# Patient Record
Sex: MALE | Race: WHITE | Employment: OTHER | URBAN - METROPOLITAN AREA
[De-identification: names, ages, dates, MRNs, and addresses within clinical notes are randomized per-mention and may not be internally consistent; named-entity substitution may affect disease eponyms.]

---

## 2021-07-22 ENCOUNTER — APPOINTMENT (OUTPATIENT)
Dept: GENERAL RADIOLOGY | Age: 76
DRG: 637 | End: 2021-07-22
Payer: MEDICARE

## 2021-07-22 ENCOUNTER — HOSPITAL ENCOUNTER (INPATIENT)
Age: 76
LOS: 1 days | Discharge: NURSING, REHAB OR LONG-TERM CARE | DRG: 637 | End: 2021-07-26
Attending: EMERGENCY MEDICINE | Admitting: INTERNAL MEDICINE
Payer: MEDICARE

## 2021-07-22 ENCOUNTER — APPOINTMENT (OUTPATIENT)
Dept: CT IMAGING | Age: 76
DRG: 637 | End: 2021-07-22
Payer: MEDICARE

## 2021-07-22 DIAGNOSIS — R77.8 ELEVATED TROPONIN: ICD-10-CM

## 2021-07-22 DIAGNOSIS — R41.0 CONFUSION: Primary | ICD-10-CM

## 2021-07-22 DIAGNOSIS — I63.9 CEREBROVASCULAR ACCIDENT (CVA), UNSPECIFIED MECHANISM (HCC): ICD-10-CM

## 2021-07-22 PROBLEM — R41.82 ALTERED MENTAL STATUS: Status: ACTIVE | Noted: 2021-07-22

## 2021-07-22 LAB
ALBUMIN SERPL-MCNC: 3.6 G/DL (ref 3.5–5.2)
ALP BLD-CCNC: 111 U/L (ref 40–129)
ALT SERPL-CCNC: 22 U/L (ref 0–40)
AMMONIA: 15 UMOL/L (ref 16–60)
ANION GAP SERPL CALCULATED.3IONS-SCNC: 9 MMOL/L (ref 7–16)
AST SERPL-CCNC: 19 U/L (ref 0–39)
BASOPHILS ABSOLUTE: 0.01 E9/L (ref 0–0.2)
BASOPHILS RELATIVE PERCENT: 0.2 % (ref 0–2)
BILIRUB SERPL-MCNC: 0.5 MG/DL (ref 0–1.2)
BILIRUBIN URINE: NEGATIVE
BLOOD, URINE: NEGATIVE
BUN BLDV-MCNC: 15 MG/DL (ref 6–23)
CALCIUM SERPL-MCNC: 8.9 MG/DL (ref 8.6–10.2)
CHLORIDE BLD-SCNC: 98 MMOL/L (ref 98–107)
CLARITY: CLEAR
CO2: 26 MMOL/L (ref 22–29)
COLOR: YELLOW
CREAT SERPL-MCNC: 0.6 MG/DL (ref 0.7–1.2)
EKG ATRIAL RATE: 84 BPM
EKG P AXIS: 43 DEGREES
EKG P-R INTERVAL: 140 MS
EKG Q-T INTERVAL: 400 MS
EKG QRS DURATION: 84 MS
EKG QTC CALCULATION (BAZETT): 472 MS
EKG R AXIS: 4 DEGREES
EKG T AXIS: -2 DEGREES
EKG VENTRICULAR RATE: 84 BPM
EOSINOPHILS ABSOLUTE: 0.04 E9/L (ref 0.05–0.5)
EOSINOPHILS RELATIVE PERCENT: 0.6 % (ref 0–6)
GFR AFRICAN AMERICAN: >60
GFR NON-AFRICAN AMERICAN: >60 ML/MIN/1.73
GLUCOSE BLD-MCNC: 479 MG/DL (ref 74–99)
GLUCOSE URINE: 500 MG/DL
HCT VFR BLD CALC: 41.2 % (ref 37–54)
HEMOGLOBIN: 13.6 G/DL (ref 12.5–16.5)
IMMATURE GRANULOCYTES #: 0.02 E9/L
IMMATURE GRANULOCYTES %: 0.3 % (ref 0–5)
KETONES, URINE: 40 MG/DL
LEUKOCYTE ESTERASE, URINE: NEGATIVE
LYMPHOCYTES ABSOLUTE: 0.69 E9/L (ref 1.5–4)
LYMPHOCYTES RELATIVE PERCENT: 10.8 % (ref 20–42)
MCH RBC QN AUTO: 30 PG (ref 26–35)
MCHC RBC AUTO-ENTMCNC: 33 % (ref 32–34.5)
MCV RBC AUTO: 90.7 FL (ref 80–99.9)
METER GLUCOSE: 243 MG/DL (ref 74–99)
MONOCYTES ABSOLUTE: 0.5 E9/L (ref 0.1–0.95)
MONOCYTES RELATIVE PERCENT: 7.8 % (ref 2–12)
NEUTROPHILS ABSOLUTE: 5.11 E9/L (ref 1.8–7.3)
NEUTROPHILS RELATIVE PERCENT: 80.3 % (ref 43–80)
NITRITE, URINE: NEGATIVE
PDW BLD-RTO: 13.2 FL (ref 11.5–15)
PH UA: 5 (ref 5–9)
PLATELET # BLD: 297 E9/L (ref 130–450)
PMV BLD AUTO: 8.6 FL (ref 7–12)
POTASSIUM REFLEX MAGNESIUM: 3.9 MMOL/L (ref 3.5–5)
PROTEIN UA: NEGATIVE MG/DL
RBC # BLD: 4.54 E12/L (ref 3.8–5.8)
SODIUM BLD-SCNC: 133 MMOL/L (ref 132–146)
SPECIFIC GRAVITY UA: 1.01 (ref 1–1.03)
TOTAL PROTEIN: 5.9 G/DL (ref 6.4–8.3)
TROPONIN, HIGH SENSITIVITY: 59 NG/L (ref 0–11)
TROPONIN, HIGH SENSITIVITY: 61 NG/L (ref 0–11)
TROPONIN, HIGH SENSITIVITY: 62 NG/L (ref 0–11)
UROBILINOGEN, URINE: 0.2 E.U./DL
WBC # BLD: 6.4 E9/L (ref 4.5–11.5)

## 2021-07-22 PROCEDURE — 93005 ELECTROCARDIOGRAM TRACING: CPT | Performed by: STUDENT IN AN ORGANIZED HEALTH CARE EDUCATION/TRAINING PROGRAM

## 2021-07-22 PROCEDURE — 93010 ELECTROCARDIOGRAM REPORT: CPT | Performed by: INTERNAL MEDICINE

## 2021-07-22 PROCEDURE — 82746 ASSAY OF FOLIC ACID SERUM: CPT

## 2021-07-22 PROCEDURE — 82140 ASSAY OF AMMONIA: CPT

## 2021-07-22 PROCEDURE — 82607 VITAMIN B-12: CPT

## 2021-07-22 PROCEDURE — 82962 GLUCOSE BLOOD TEST: CPT

## 2021-07-22 PROCEDURE — 6370000000 HC RX 637 (ALT 250 FOR IP): Performed by: INTERNAL MEDICINE

## 2021-07-22 PROCEDURE — 2580000003 HC RX 258: Performed by: INTERNAL MEDICINE

## 2021-07-22 PROCEDURE — 80053 COMPREHEN METABOLIC PANEL: CPT

## 2021-07-22 PROCEDURE — G0378 HOSPITAL OBSERVATION PER HR: HCPCS

## 2021-07-22 PROCEDURE — 81003 URINALYSIS AUTO W/O SCOPE: CPT

## 2021-07-22 PROCEDURE — 36415 COLL VENOUS BLD VENIPUNCTURE: CPT

## 2021-07-22 PROCEDURE — 99285 EMERGENCY DEPT VISIT HI MDM: CPT

## 2021-07-22 PROCEDURE — 84484 ASSAY OF TROPONIN QUANT: CPT

## 2021-07-22 PROCEDURE — 71045 X-RAY EXAM CHEST 1 VIEW: CPT

## 2021-07-22 PROCEDURE — 85025 COMPLETE CBC W/AUTO DIFF WBC: CPT

## 2021-07-22 PROCEDURE — 72100 X-RAY EXAM L-S SPINE 2/3 VWS: CPT

## 2021-07-22 PROCEDURE — 70450 CT HEAD/BRAIN W/O DYE: CPT

## 2021-07-22 RX ORDER — DEXTROSE MONOHYDRATE 50 MG/ML
100 INJECTION, SOLUTION INTRAVENOUS PRN
Status: DISCONTINUED | OUTPATIENT
Start: 2021-07-22 | End: 2021-07-26 | Stop reason: HOSPADM

## 2021-07-22 RX ORDER — SODIUM CHLORIDE 0.9 % (FLUSH) 0.9 %
5-40 SYRINGE (ML) INJECTION PRN
Status: DISCONTINUED | OUTPATIENT
Start: 2021-07-22 | End: 2021-07-26 | Stop reason: HOSPADM

## 2021-07-22 RX ORDER — POLYETHYLENE GLYCOL 3350 17 G/17G
17 POWDER, FOR SOLUTION ORAL DAILY PRN
Status: DISCONTINUED | OUTPATIENT
Start: 2021-07-22 | End: 2021-07-26 | Stop reason: HOSPADM

## 2021-07-22 RX ORDER — ACETAMINOPHEN 325 MG/1
650 TABLET ORAL EVERY 6 HOURS PRN
Status: DISCONTINUED | OUTPATIENT
Start: 2021-07-22 | End: 2021-07-26 | Stop reason: HOSPADM

## 2021-07-22 RX ORDER — SODIUM CHLORIDE 9 MG/ML
25 INJECTION, SOLUTION INTRAVENOUS PRN
Status: DISCONTINUED | OUTPATIENT
Start: 2021-07-22 | End: 2021-07-26 | Stop reason: HOSPADM

## 2021-07-22 RX ORDER — ASPIRIN 81 MG/1
324 TABLET, CHEWABLE ORAL ONCE
Status: DISCONTINUED | OUTPATIENT
Start: 2021-07-22 | End: 2021-07-23

## 2021-07-22 RX ORDER — NICOTINE POLACRILEX 4 MG
15 LOZENGE BUCCAL PRN
Status: DISCONTINUED | OUTPATIENT
Start: 2021-07-22 | End: 2021-07-26 | Stop reason: HOSPADM

## 2021-07-22 RX ORDER — 0.9 % SODIUM CHLORIDE 0.9 %
1000 INTRAVENOUS SOLUTION INTRAVENOUS ONCE
Status: DISCONTINUED | OUTPATIENT
Start: 2021-07-22 | End: 2021-07-26 | Stop reason: HOSPADM

## 2021-07-22 RX ORDER — HYDRALAZINE HYDROCHLORIDE 20 MG/ML
5 INJECTION INTRAMUSCULAR; INTRAVENOUS ONCE
Status: DISCONTINUED | OUTPATIENT
Start: 2021-07-22 | End: 2021-07-22

## 2021-07-22 RX ORDER — SODIUM CHLORIDE 0.9 % (FLUSH) 0.9 %
5-40 SYRINGE (ML) INJECTION EVERY 12 HOURS SCHEDULED
Status: DISCONTINUED | OUTPATIENT
Start: 2021-07-22 | End: 2021-07-26 | Stop reason: HOSPADM

## 2021-07-22 RX ORDER — ACETAMINOPHEN 650 MG/1
650 SUPPOSITORY RECTAL EVERY 6 HOURS PRN
Status: DISCONTINUED | OUTPATIENT
Start: 2021-07-22 | End: 2021-07-26 | Stop reason: HOSPADM

## 2021-07-22 RX ORDER — DEXTROSE MONOHYDRATE 25 G/50ML
12.5 INJECTION, SOLUTION INTRAVENOUS PRN
Status: DISCONTINUED | OUTPATIENT
Start: 2021-07-22 | End: 2021-07-26 | Stop reason: HOSPADM

## 2021-07-22 RX ADMIN — Medication 10 ML: at 21:00

## 2021-07-22 RX ADMIN — INSULIN LISPRO 1 UNITS: 100 INJECTION, SOLUTION INTRAVENOUS; SUBCUTANEOUS at 22:37

## 2021-07-22 ASSESSMENT — ENCOUNTER SYMPTOMS
COUGH: 0
VOMITING: 0
EYE REDNESS: 0
SHORTNESS OF BREATH: 0
NAUSEA: 0
BACK PAIN: 0
EYE DISCHARGE: 0
SORE THROAT: 0
DIARRHEA: 0
SINUS PRESSURE: 0
EYE PAIN: 0
WHEEZING: 0
ABDOMINAL PAIN: 0

## 2021-07-22 ASSESSMENT — PAIN DESCRIPTION - LOCATION: LOCATION: BACK

## 2021-07-22 ASSESSMENT — PAIN DESCRIPTION - ORIENTATION: ORIENTATION: LOWER

## 2021-07-22 ASSESSMENT — PAIN SCALES - GENERAL
PAINLEVEL_OUTOF10: 0
PAINLEVEL_OUTOF10: 5

## 2021-07-22 NOTE — ED PROVIDER NOTES
Patient presents with concerns for falls. Patient is having difficulty speaking and does have confusion. He is unable to provide a history. Patient was sent in by social work for concern for caring for self. According the social work patient does live by himself in an apartment and regularly takes care of himself along with advanced daily living activities. However lately patient has had increasing difficulty including missing his rent payment. Social work was checking in on him in regards to this. Last known well is unknown. The history is limited secondary to the patient's altered mental status    The history is provided by the patient. The history is limited by the condition of the patient. No  was used. Review of Systems   Constitutional: Negative for chills and fever. HENT: Negative for ear pain, sinus pressure and sore throat. Eyes: Negative for pain, discharge and redness. Respiratory: Negative for cough, shortness of breath and wheezing. Cardiovascular: Negative for chest pain. Gastrointestinal: Negative for abdominal pain, diarrhea, nausea and vomiting. Genitourinary: Negative for dysuria and frequency. Musculoskeletal: Positive for gait problem. Negative for arthralgias and back pain. Skin: Negative for rash and wound. Neurological: Negative for weakness and headaches. Hematological: Negative for adenopathy. Psychiatric/Behavioral: Positive for confusion. All other systems reviewed and are negative. Physical Exam  Vitals and nursing note reviewed. Constitutional:       Appearance: He is well-developed. HENT:      Head: Normocephalic and atraumatic. Eyes:      General: No visual field deficit. Conjunctiva/sclera: Conjunctivae normal.   Cardiovascular:      Rate and Rhythm: Normal rate and regular rhythm. Heart sounds: Normal heart sounds. No murmur heard.      Pulmonary:      Effort: Pulmonary effort is normal. No respiratory distress. Breath sounds: Normal breath sounds. No wheezing or rales. Abdominal:      General: Bowel sounds are normal.      Palpations: Abdomen is soft. Tenderness: There is no abdominal tenderness. There is no guarding or rebound. Musculoskeletal:         General: No tenderness or deformity. Cervical back: Normal range of motion and neck supple. Skin:     General: Skin is warm and dry. Neurological:      Mental Status: He is alert and oriented to person, place, and time. He is confused. Cranial Nerves: Dysarthria present. No cranial nerve deficit or facial asymmetry. Psychiatric:         Mood and Affect: Mood normal.         Behavior: Behavior normal.          Procedures     MDM  Number of Diagnoses or Management Options  Diagnosis management comments: Patient presents with active confusion. Patient's baseline is alert and oriented x4 and is able to care for himself. He does live alone. Patient does have dysarthria however last known well is unknown. CT of the head was obtained and did show leukomalacia and atrophy however there were no acute intracranial processes. Chest x-ray did not show any pneumonias or acute cardiopulmonary processes. CBC was without any leukocytosis. CMP was unremarkable. Urinalysis did not show any signs of infection. Troponin was elevated at 62 and repeat at 61. EKG did not show any signs of MI. At this point there is concerned that patient is still actively confused. Patient will be admitted for further evaluation and possible placement. Patient was informed the results and plan and is agreeable. Patient admitted. Amount and/or Complexity of Data Reviewed  Clinical lab tests: reviewed  Tests in the radiology section of CPT®: reviewed  Tests in the medicine section of CPT®: reviewed         ED Course as of Jul 22 1822   Thu Jul 22, 2021   1423 EKG shows normal sinus rhythm with a ventricular rate of 84 bpm.  Normal axis.   There are no obvious ST elevations. There is a T wave inversion and lead III. No previous EKG for comparison. [BB]      ED Course User Index  [BB] April Celestin DO      ED Course as of Jul 22 1824   Thu Jul 22, 2021   1423 EKG shows normal sinus rhythm with a ventricular rate of 84 bpm.  Normal axis. There are no obvious ST elevations. There is a T wave inversion and lead III. No previous EKG for comparison. [BB]      ED Course User Index  [BB] April Celestin DO       --------------------------------------------- PAST HISTORY ---------------------------------------------  Past Medical History:  has no past medical history on file. Past Surgical History:  has no past surgical history on file. Social History:  reports that he has never smoked. He has never used smokeless tobacco.    Family History: family history is not on file. The patients home medications have been reviewed.     Allergies: Patient has no allergy information on record.    -------------------------------------------------- RESULTS -------------------------------------------------    LABS:  Results for orders placed or performed during the hospital encounter of 07/22/21   CBC Auto Differential   Result Value Ref Range    WBC 6.4 4.5 - 11.5 E9/L    RBC 4.54 3.80 - 5.80 E12/L    Hemoglobin 13.6 12.5 - 16.5 g/dL    Hematocrit 41.2 37.0 - 54.0 %    MCV 90.7 80.0 - 99.9 fL    MCH 30.0 26.0 - 35.0 pg    MCHC 33.0 32.0 - 34.5 %    RDW 13.2 11.5 - 15.0 fL    Platelets 667 742 - 830 E9/L    MPV 8.6 7.0 - 12.0 fL    Neutrophils % 80.3 (H) 43.0 - 80.0 %    Immature Granulocytes % 0.3 0.0 - 5.0 %    Lymphocytes % 10.8 (L) 20.0 - 42.0 %    Monocytes % 7.8 2.0 - 12.0 %    Eosinophils % 0.6 0.0 - 6.0 %    Basophils % 0.2 0.0 - 2.0 %    Neutrophils Absolute 5.11 1.80 - 7.30 E9/L    Immature Granulocytes # 0.02 E9/L    Lymphocytes Absolute 0.69 (L) 1.50 - 4.00 E9/L    Monocytes Absolute 0.50 0.10 - 0.95 E9/L    Eosinophils Absolute 0.04 (L) 0.05 - 0.50 E9/L    Basophils Absolute 0.01 0.00 - 0.20 E9/L   Comprehensive Metabolic Panel w/ Reflex to MG   Result Value Ref Range    Sodium 133 132 - 146 mmol/L    Potassium reflex Magnesium 3.9 3.5 - 5.0 mmol/L    Chloride 98 98 - 107 mmol/L    CO2 26 22 - 29 mmol/L    Anion Gap 9 7 - 16 mmol/L    Glucose 479 (H) 74 - 99 mg/dL    BUN 15 6 - 23 mg/dL    CREATININE 0.6 (L) 0.7 - 1.2 mg/dL    GFR Non-African American >60 >=60 mL/min/1.73    GFR African American >60     Calcium 8.9 8.6 - 10.2 mg/dL    Total Protein 5.9 (L) 6.4 - 8.3 g/dL    Albumin 3.6 3.5 - 5.2 g/dL    Total Bilirubin 0.5 0.0 - 1.2 mg/dL    Alkaline Phosphatase 111 40 - 129 U/L    ALT 22 0 - 40 U/L    AST 19 0 - 39 U/L   Urinalysis, reflex to microscopic   Result Value Ref Range    Color, UA Yellow Straw/Yellow    Clarity, UA Clear Clear    Glucose, Ur 500 (A) Negative mg/dL    Bilirubin Urine Negative Negative    Ketones, Urine 40 (A) Negative mg/dL    Specific Gravity, UA 1.015 1.005 - 1.030    Blood, Urine Negative Negative    pH, UA 5.0 5.0 - 9.0    Protein, UA Negative Negative mg/dL    Urobilinogen, Urine 0.2 <2.0 E.U./dL    Nitrite, Urine Negative Negative    Leukocyte Esterase, Urine Negative Negative   Troponin   Result Value Ref Range    Troponin, High Sensitivity 62 (H) 0 - 11 ng/L   Troponin   Result Value Ref Range    Troponin, High Sensitivity 61 (H) 0 - 11 ng/L   EKG 12 Lead   Result Value Ref Range    Ventricular Rate 84 BPM    Atrial Rate 84 BPM    P-R Interval 140 ms    QRS Duration 84 ms    Q-T Interval 400 ms    QTc Calculation (Bazett) 472 ms    P Axis 43 degrees    R Axis 4 degrees    T Axis -2 degrees       RADIOLOGY:  CT Head WO Contrast   Final Result   1. There is no acute intracranial abnormality. Specifically, there is no   intracranial hemorrhage. 2. Atrophy and periventricular leukomalacia,         XR CHEST 1 VIEW   Final Result   No acute process.          XR LUMBAR SPINE (2-3 VIEWS)   Final Result   Grade 1 anterior evaluation of Fall (x 3 in a month, c/o lower back pain) and Altered Mental Status (states has been very forgetful lately, hasn't taken diabetes medicine for a while, bgl in 400s, given 500 mL NaCl by ems)    I have reviewed and discussed the case, including pertinent history (medical, surgical, family and social) and exam findings with the Resident and the Nurse assigned to [facility]. I have personally performed and/or participated in the history, exam, medical decision making, and procedures and agree with all pertinent clinical information. I have reviewed my findings and recommendations with [facility] and members of family present at the time of disposition. I, Dr. Laureano Thompson am the primary physician of record for this patient. MDM: The patient is 78 y.o. male  with a past medical history of History reviewed. No pertinent past medical history. presenting to the emergency department with a chief complaint of altered mental status and falls. Differential diagnosis includes but not limited to, dementia, electrolyte derangement, intracranial hemorrhage. The patient did have labs and imaging which were reviewed. CBC, CMP fairly unremarkable, patient with high-sensitivity troponin elevated, no ischemic changes on EKG, CT head with no acute process. Patient will be admitted for further treatment and evaluation. My findings/plan: The primary encounter diagnosis was Confusion. Diagnoses of Elevated troponin and Cerebrovascular accident (CVA), unspecified mechanism ([address] were also pertinent to this visit.   Discharge Medication List as of 7/26/2021  3:14 PM      START taking these medications    Details   aspirin 81 MG EC tablet Take 1 tablet by mouth daily, Disp-30 tablet, R-3OTC      alogliptin (NESINA) 12.5 MG TABS tablet Take 1 tablet by mouth daily, Disp-60 tabletDC to SNF      !! insulin lispro (HUMALOG) 100 UNIT/ML injection vial Inject 0-3 Units into the skin nightly, Disp-1 vial, R-3Print      !! insulin lispro (HUMALOG) 100 UNIT/ML injection vial Inject 0-6 Units into the skin 3 times daily (with meals), Disp-1 vial, R-3DC to SNF      metFORMIN (GLUCOPHAGE) 1000 MG tablet Take 1 tablet by mouth 2 times daily (with meals)DC to SNF      atorvastatin (LIPITOR) 20 MG tablet Take 1 tablet by mouth nightly, Disp-30 tablet, R-3DC to SNF      lisinopril (PRINIVIL;ZESTRIL) 10 MG tablet Take 1 tablet by mouth daily, Disp-30 tablet, R-3DC to SNF      thiamine mononitrate (THIAMINE) 100 MG tablet Take 1 tablet by mouth daily, R-0DC to SNF       ! ! - Potential duplicate medications found. Please discuss with provider.         Sacha Mckeon DO  07/30/21 1013

## 2021-07-22 NOTE — Clinical Note
Patient Class: Observation [104]   REQUIRED: Diagnosis: Altered mental status [780.97. ICD-9-CM]   Estimated Length of Stay: Estimated stay of less than 2 midnights   Admitting Provider: Adam Win [[id number]]   Telemetry/Cardiac Monitoring Required?: No

## 2021-07-22 NOTE — CARE COORDINATION
ASHLEY Consult. Pt here for fall and AMS. Pt states he has been very forgetful and has not taken his diabetic meds for a while. No Covid test.     SW met w/pt in ED 10 and explained role. Security is in room and took $2200 Cash from pt to be locked up. SW asks pt why he has this money and he cannot give reason except that there are thieves where he lives. Pt states they will take his money. SW asks pt if he has banking and checking account and he is not sure, just stares. Pt states he receives SS income. Pt knows he lives in apartments but does not remember the name- but does know the address. Pt stutters @ times when talking, has long pauses, sometimes just stares and cannot remember much. He says his memory has been bad. He cannot tell me the name of his son, his PCP, how he broke his glasses (he is wearing them and there is no lens in Right and no right arm) or what insurance he has. Pt states he has fallen 3x in last month while walking to/from the store. He now uses a cane. Pt states he does not drive and believes his falls has something to do w/his memory issue. Pt states he was not assaulted, @ least he does not recall being attacked. Pt cannot identify and family/relatives to call or cannot recall any phone #'s. Pt does state he is single and served in [employer] for 4 yrs. It's unclear if he has and VA benefits. Pt does not initiate any conversation and notes he is diabetic but is not taking his meds. He is not sure where he gets them from. ASHLEY looked up pt's address and this is [employer]- low income. SW called [employer] [phone] and spoke to [employer]. ASHLEY was notified that pt has lived there since 2008. There are no known issues/probalems w/pt. He seems like good tenant. However, recently he did not pay his rent. They talked about it w/pt and he paid it. The SW @ the [employer] made visit to check on pt today and noticed he was not himself- not @ baseline. He did not know who she was and was confused. She called 911 as pt was hesitant to seek treatment. She talked to pt into going to ED. Wolfgang Dale is not aware of any emergency contacts for pt. She was able to locate a Anna Dao- which appears to be pt's son living @ [address] [place] [phone]. She called and left message for him to call her. If she learns of anything else she will call SW back. SW apprised Dr. Liss Pappas of above findings. SW also notified pt about his son and # Wolfgang Dale obtained. Pt states he does not talk to him much and SW asked several times to call his son for informaiton and he declines. Pt states he will call later. SW offered him a phone to call now and he declines.      Electronically signed by SITA Fernandez on 7/22/2021 at 5:35 PM

## 2021-07-22 NOTE — LETTER
[place] Department Medicaid  CERTIFICATION OF NECESSITY  FOR NON-EMERGENCY TRANSPORTATION   BY GROUND AMBULANCE      Individual Information   1. Name: Efra Meraz 2. [place] Medicaid Billing Number:    3. Address: [place] [address]      Transportation Provider Information   4. Provider Name: MIGEL   5. [place] Medicaid Provider Number: [id number] National Provider Identifier (NPI): [id number]     Certification  7. Criteria:  During transport, this individual requires:  [] Medical treatment or continuous     supervision by an EMT. [] The administration or regulation of oxygen by another person. [] Supervised protective restraint. 8. Period Beginning Date: 07/26/2021   9. Length  [x] Not more than 1DAY  [] One Year     Additional Information Relevant to Certification   10. Comments or Explanations, If Necessary or Appropriate  ALTERED MENTAL STATUS D/T ENCEPHALOPATHY POSSIBLY D/T PSYCHIATRIC  VS AMBULATORY DYSFUNCTION VS FAILURE TO THRIVE, CONFUSION, FALLS RISK WITH MULTIPLE FALLS, CVA          Certifying Practitioner Information   11. Name of Practitioner: DR Tee Freed DO   12. [place] Medicaid Provider Number, If Applicable:  [address] Provider Identifier (NPI): [id number]     Signature Information   14. Date of Signature: 07/26/2021 15. Name of Person Signing: Josep Barakat. [facility]   16. Signature and Professional Designation: Luz Gutierrez. [address] [place] [address].7/26/20213:11 PM.      [facility] [id number]  Rev. 7/2015    [address] [facility] Encounter Date/Time: 7/22/2021 [address] Account: [de-identified]    MRN: [id number]    Patient: Leopoldo Perch Serial #: [id number]      ENCOUNTER          Patient Class: I Private Enc? No Unit RM BD: [facility] 3 SURG [id number]   Hospital Service: Med/Surg   Encounter DX: Altered mental status [R*   ADM Provider: Lali Lorenzo DO   Procedure:     ATT Provider: Lali Lorenzo DO   REF Provider:        Admission DX:  Altered mental status and DX codes: R41.82      PATIENT                 Name: Ankur Lugo : 1945 (76 yrs)   Address: [address] [place] [address] [address] Sex: Male   [facility] [address]         Marital Status:    Employer: RETIRED         Lutheran: Unknown   Primary Care Provider:           Primary Phone: [phone]   EMERGENCY CONTACT   Contact Name Legal Guardian? Relationship to Patient Home Phone Work Phone   1. NONE, PER PT  2. *No Contact Specified* No    Other    [phone]                 GUARANTOR            Guarantor: Oliver Meraz     : 1945   Address: [address] Sex: Male     [address] [id number]     Relation to Patient: Self       Home Phone: [phone]   Guarantor ID: [id number]       Work Phone: [phone]   Guarantor Employer: RETIRED         Status: RETIRED      COVERAGE        PRIMARY INSURANCE   Payor: MEDICARE Plan: MEDICARE PART A AND B   Payor Address: [facility] [id number], [place] [address]       Group Number:   Insurance Type: INDEMNITY   Subscriber Name: Georgia Pinon : 1945   Subscriber ID: [id number] Pat. Rel. to Sub: Self   SECONDARY INSURANCE   Payor:   Plan:     Payor Address:  ,           Group Number:   Insurance Type:     Subscriber Name:   Subscriber :     Subscriber ID:   Pat.  Rel. to Sub:

## 2021-07-22 NOTE — H&P
Department of Internal Medicine  History and Physical    [facility]  Admitting Physician: Dr. Monalisa Grajeda  Consultants: Dr. Caro Denis  Date of Service: 7/22/2021    CHIEF COMPLAINT: Altered mental status, ambulatory dysfunction, failure to thrive    HISTORY OF PRESENT ILLNESS:    Patient is 80-year-old male who presented to the ED due to altered mental status and ambulatory dysfunction. Patient states that he has been having issues with ambulation. He states that he has fallen multiple times in the last few months. He denies any loss of consciousness. States that he stumbles and trips which lead to his falls. As such she is less active. Additionally patient on exam is having trouble remembering what has occurred in the past.  He also has trouble speaking. Sentences do not come out smoothly. Although he does not slur his speech. According to social work patient was taking care of himself with help from a few friends. He has been in his own bills. However most recently when his  visited him patient was found to be living in more deplorable condition. He had not been taking care of his paperwork/bills. Additionally patient was confused. It appears that patient on my exam has improved since he has been admitted. Patient does admit to urinary frequency. Patient states he does not take his medications or he has not taken his medications in the last 2 months. PAST MEDICAL Hx:  History reviewed. No pertinent past medical history. PAST SURGICAL Hx:   History reviewed. No pertinent surgical history. FAMILY Hx:  History reviewed. No pertinent family history. HOME MEDICATIONS:  Prior to Admission medications    Not on File       ALLERGIES:  Patient has no allergy information on record.     SOCIAL Hx:  Social History     Socioeconomic History    Marital status:      Spouse name: Not on file    Number of children: Not on file    Years of education: Not on file    Highest education level: Not on file   Occupational History    Not on file   Tobacco Use    Smoking status: Never Smoker    Smokeless tobacco: Never Used   Substance and Sexual Activity    Alcohol use: Not on file    Drug use: Not on file    Sexual activity: Not on file   Other Topics Concern    Not on file   Social History Narrative    Not on file     Social Determinants of Health     Financial Resource Strain:     Difficulty of Paying Living Expenses:    Food Insecurity:     Worried About Running Out of Food in the Last Year:     [address] in the Last Year:    Transportation Needs:     Lack of Transportation (Medical):  Lack of Transportation (Non-Medical):    Physical Activity:     Days of Exercise per Week:     Minutes of Exercise per Session:    Stress:     Feeling of Stress :    Social Connections:     Frequency of Communication with Friends and Family:     Frequency of Social Gatherings with Friends and Family:     Attends Quaker Services:     Active Member of Clubs or Organizations:     Attends Club or Organization Meetings:     Marital Status:    Intimate Partner Violence:     Fear of Current or Ex-Partner:     Emotionally Abused:     Physically Abused:     Sexually Abused:        ROS: Positive in bold  General:   Denies chills, fatigue, fever, malaise, night sweats or weight loss    Psychological:   Denies anxiety, disorientation or hallucinations    ENT:    Denies epistaxis, headaches, vertigo or visual changes    Cardiovascular:   Denies any chest pain, irregular heartbeats, or palpitations. No paroxysmal nocturnal dyspnea. Respiratory:   Denies shortness of breath, coughing, sputum production, hemoptysis, or wheezing. No orthopnea. Gastrointestinal:   Denies nausea, vomiting, diarrhea, or constipation. Denies any abdominal pain. Denies change in bowel habits or stools. Genito-Urinary:    Denies any urgency, frequency, hematuria.   Voiding without difficulty. Musculoskeletal:   Denies joint pain, joint stiffness, joint swelling or muscle pain    Neurology:    Denies any headache or focal neurological deficits. No weakness or paresthesia. Derm:    Denies any rashes, ulcers, or excoriations. Denies bruising. Extremities:   Denies any lower extremity swelling or edema. PHYSICAL EXAM: Abnormal findings noted  VITALS:  Vitals:    07/22/21 1831   BP: (!) 200/90   Pulse: 73   Resp: 20   Temp: 98.2 °F (36.8 °C)   SpO2: 98%         CONSTITUTIONAL:    Awake, alert, cooperative, no apparent distress, and appears stated age    EYES:     EOMI, sclera clear, conjunctiva normal    ENT:    Normocephalic, atraumatic,  External ears without lesions. NECK:    Supple, symmetrical, trachea midline,  no JVD    HEMATOLOGIC/LYMPHATICS:    No cervical lymphadenopathy and no supraclavicular lymphadenopathy    LUNGS:    Symmetric. No increased work of breathing, good air exchange, clear to auscultation bilaterally, no wheezes, rhonchi, or rales,     CARDIOVASCULAR:    Normal apical impulse, regular rate and rhythm, normal S1 and S2, no S3 or S4, and no murmur noted    ABDOMEN:    soft, non-distended, non-tender    MUSCULOSKELETAL:    There is no redness, warmth, or swelling of the joints. NEUROLOGIC:    Awake, alert, oriented to name, place and time. SKIN:    No bruising or bleeding. No redness, warmth, or swelling    EXTREMITIES:    Peripheral pulses present. No edema, cyanosis, or swelling.     LINES/CATHETERS     LABORATORY DATA:  CBC with Differential:    Lab Results   Component Value Date    WBC 6.4 07/22/2021    RBC 4.54 07/22/2021    HGB 13.6 07/22/2021    HCT 41.2 07/22/2021     07/22/2021    MCV 90.7 07/22/2021    MCH 30.0 07/22/2021    MCHC 33.0 07/22/2021    RDW 13.2 07/22/2021    SEGSPCT 66 04/28/2011    LYMPHOPCT 10.8 07/22/2021    MONOPCT 7.8 07/22/2021    BASOPCT 0.2 07/22/2021    MONOSABS 0.50 07/22/2021    LYMPHSABS 0.69 07/22/2021    EOSABS 0.04 07/22/2021    BASOSABS 0.01 07/22/2021     CMP:    Lab Results   Component Value Date     07/22/2021    K 3.9 07/22/2021    CL 98 07/22/2021    CO2 26 07/22/2021    BUN 15 07/22/2021    CREATININE 0.6 07/22/2021    GFRAA >60 07/22/2021    LABGLOM >60 07/22/2021    GLUCOSE 479 07/22/2021    GLUCOSE 142 04/29/2011    PROT 5.9 07/22/2021    LABALBU 3.6 07/22/2021    CALCIUM 8.9 07/22/2021    BILITOT 0.5 07/22/2021    ALKPHOS 111 07/22/2021    AST 19 07/22/2021    ALT 22 07/22/2021       ASSESSMENT/PLAN:  1. Encephalopathy possibly related to psychiatric versus neurologicversus medical condition  2. Elevated troponin  3. Grade 1 anterior subluxation of L5 with respect to S1  4. Ambulatory dysfunction with multiple falls in last few months  5. Urinary frequency  6. Failure to thrive  7. Medical noncompliance  8. Hyperglycemia  9. Hypertension      Patient presented to the ED from home due to altered mental status and altered behavior from baseline. Patient is a poor historian. States he has not been taking his medications for the last few months. However he did not know his prescribed medications either. At baseline he was able to take care of himself with help of friends. However this has changed over the last 1 to 2 months. He is alert oriented x3. We will try to get in contact with any acquaintances to inquire more about him medical/clinical history. We will obtain medical records from the [place]. PT OT will be consulted. Social work to be consulted as well. Neurology to be consulted for altered mental status as well.     Morgan Morrison DO  7:17 PM  7/22/2021    Electronically signed by Morgan Morrison DO on 7/22/21 at 7:17 PM EDT

## 2021-07-22 NOTE — ED NOTES
Patient states has large amount of money that he wants PD to secure, aristeo and maryjane at bedside now     Wili Osman RN  07/22/21 6156

## 2021-07-22 NOTE — ED NOTES
Bed: 10  Expected date:   Expected time:   Means of arrival:   Comments:  Via Mary Ellen Aldrich RN  07/22/21 2919

## 2021-07-23 ENCOUNTER — APPOINTMENT (OUTPATIENT)
Dept: MRI IMAGING | Age: 76
DRG: 637 | End: 2021-07-23
Payer: MEDICARE

## 2021-07-23 LAB
ALBUMIN SERPL-MCNC: 3.4 G/DL (ref 3.5–5.2)
ALP BLD-CCNC: 102 U/L (ref 40–129)
ALT SERPL-CCNC: 20 U/L (ref 0–40)
ANION GAP SERPL CALCULATED.3IONS-SCNC: 10 MMOL/L (ref 7–16)
AST SERPL-CCNC: 17 U/L (ref 0–39)
BASOPHILS ABSOLUTE: 0.02 E9/L (ref 0–0.2)
BASOPHILS RELATIVE PERCENT: 0.3 % (ref 0–2)
BILIRUB SERPL-MCNC: 0.5 MG/DL (ref 0–1.2)
BUN BLDV-MCNC: 14 MG/DL (ref 6–23)
CALCIUM SERPL-MCNC: 9 MG/DL (ref 8.6–10.2)
CHLORIDE BLD-SCNC: 103 MMOL/L (ref 98–107)
CHOLESTEROL, TOTAL: 181 MG/DL (ref 0–199)
CO2: 24 MMOL/L (ref 22–29)
CREAT SERPL-MCNC: 0.7 MG/DL (ref 0.7–1.2)
EOSINOPHILS ABSOLUTE: 0.08 E9/L (ref 0.05–0.5)
EOSINOPHILS RELATIVE PERCENT: 1.2 % (ref 0–6)
FOLATE: 18.8 NG/ML (ref 4.8–24.2)
GFR AFRICAN AMERICAN: >60
GFR NON-AFRICAN AMERICAN: >60 ML/MIN/1.73
GLUCOSE BLD-MCNC: 252 MG/DL (ref 74–99)
HBA1C MFR BLD: 11.5 % (ref 4–5.6)
HCT VFR BLD CALC: 42.8 % (ref 37–54)
HDLC SERPL-MCNC: 48 MG/DL
HEMOGLOBIN: 14.6 G/DL (ref 12.5–16.5)
IMMATURE GRANULOCYTES #: 0.02 E9/L
IMMATURE GRANULOCYTES %: 0.3 % (ref 0–5)
LDL CHOLESTEROL CALCULATED: 111 MG/DL (ref 0–99)
LYMPHOCYTES ABSOLUTE: 1.41 E9/L (ref 1.5–4)
LYMPHOCYTES RELATIVE PERCENT: 21.1 % (ref 20–42)
MAGNESIUM: 2 MG/DL (ref 1.6–2.6)
MCH RBC QN AUTO: 30 PG (ref 26–35)
MCHC RBC AUTO-ENTMCNC: 34.1 % (ref 32–34.5)
MCV RBC AUTO: 87.9 FL (ref 80–99.9)
METER GLUCOSE: 221 MG/DL (ref 74–99)
METER GLUCOSE: 253 MG/DL (ref 74–99)
METER GLUCOSE: 297 MG/DL (ref 74–99)
METER GLUCOSE: 433 MG/DL (ref 74–99)
MONOCYTES ABSOLUTE: 0.72 E9/L (ref 0.1–0.95)
MONOCYTES RELATIVE PERCENT: 10.8 % (ref 2–12)
NEUTROPHILS ABSOLUTE: 4.44 E9/L (ref 1.8–7.3)
NEUTROPHILS RELATIVE PERCENT: 66.3 % (ref 43–80)
PDW BLD-RTO: 13 FL (ref 11.5–15)
PHOSPHORUS: 3.2 MG/DL (ref 2.5–4.5)
PLATELET # BLD: 305 E9/L (ref 130–450)
PMV BLD AUTO: 8.6 FL (ref 7–12)
POTASSIUM SERPL-SCNC: 3.5 MMOL/L (ref 3.5–5)
PROCALCITONIN: 0.06 NG/ML (ref 0–0.08)
RBC # BLD: 4.87 E12/L (ref 3.8–5.8)
SODIUM BLD-SCNC: 137 MMOL/L (ref 132–146)
TOTAL PROTEIN: 5.9 G/DL (ref 6.4–8.3)
TRIGL SERPL-MCNC: 111 MG/DL (ref 0–149)
TROPONIN, HIGH SENSITIVITY: 55 NG/L (ref 0–11)
TSH SERPL DL<=0.05 MIU/L-ACNC: 1.15 UIU/ML (ref 0.27–4.2)
VITAMIN B-12: 517 PG/ML (ref 211–946)
VLDLC SERPL CALC-MCNC: 22 MG/DL
WBC # BLD: 6.7 E9/L (ref 4.5–11.5)

## 2021-07-23 PROCEDURE — 6370000000 HC RX 637 (ALT 250 FOR IP): Performed by: INTERNAL MEDICINE

## 2021-07-23 PROCEDURE — 84443 ASSAY THYROID STIM HORMONE: CPT

## 2021-07-23 PROCEDURE — 51798 US URINE CAPACITY MEASURE: CPT

## 2021-07-23 PROCEDURE — 6370000000 HC RX 637 (ALT 250 FOR IP): Performed by: PSYCHIATRY & NEUROLOGY

## 2021-07-23 PROCEDURE — 82962 GLUCOSE BLOOD TEST: CPT

## 2021-07-23 PROCEDURE — 92610 EVALUATE SWALLOWING FUNCTION: CPT | Performed by: SPEECH-LANGUAGE PATHOLOGIST

## 2021-07-23 PROCEDURE — 97165 OT EVAL LOW COMPLEX 30 MIN: CPT

## 2021-07-23 PROCEDURE — 84100 ASSAY OF PHOSPHORUS: CPT

## 2021-07-23 PROCEDURE — 83036 HEMOGLOBIN GLYCOSYLATED A1C: CPT

## 2021-07-23 PROCEDURE — 80061 LIPID PANEL: CPT

## 2021-07-23 PROCEDURE — 97161 PT EVAL LOW COMPLEX 20 MIN: CPT

## 2021-07-23 PROCEDURE — 70547 MR ANGIOGRAPHY NECK W/O DYE: CPT

## 2021-07-23 PROCEDURE — 83735 ASSAY OF MAGNESIUM: CPT

## 2021-07-23 PROCEDURE — 70544 MR ANGIOGRAPHY HEAD W/O DYE: CPT

## 2021-07-23 PROCEDURE — G0378 HOSPITAL OBSERVATION PER HR: HCPCS

## 2021-07-23 PROCEDURE — 84145 PROCALCITONIN (PCT): CPT

## 2021-07-23 PROCEDURE — 36415 COLL VENOUS BLD VENIPUNCTURE: CPT

## 2021-07-23 PROCEDURE — 70551 MRI BRAIN STEM W/O DYE: CPT

## 2021-07-23 PROCEDURE — 80053 COMPREHEN METABOLIC PANEL: CPT

## 2021-07-23 PROCEDURE — 85025 COMPLETE CBC W/AUTO DIFF WBC: CPT

## 2021-07-23 PROCEDURE — 6360000002 HC RX W HCPCS: Performed by: INTERNAL MEDICINE

## 2021-07-23 PROCEDURE — 96372 THER/PROPH/DIAG INJ SC/IM: CPT

## 2021-07-23 RX ORDER — ASPIRIN 81 MG/1
81 TABLET ORAL DAILY
Status: DISCONTINUED | OUTPATIENT
Start: 2021-07-23 | End: 2021-07-26 | Stop reason: HOSPADM

## 2021-07-23 RX ORDER — GAUZE BANDAGE 2" X 2"
100 BANDAGE TOPICAL DAILY
Status: DISCONTINUED | OUTPATIENT
Start: 2021-07-23 | End: 2021-07-26 | Stop reason: HOSPADM

## 2021-07-23 RX ORDER — LISINOPRIL 10 MG/1
10 TABLET ORAL DAILY
Status: DISCONTINUED | OUTPATIENT
Start: 2021-07-23 | End: 2021-07-26 | Stop reason: HOSPADM

## 2021-07-23 RX ADMIN — INSULIN LISPRO 2 UNITS: 100 INJECTION, SOLUTION INTRAVENOUS; SUBCUTANEOUS at 21:18

## 2021-07-23 RX ADMIN — INSULIN LISPRO 2 UNITS: 100 INJECTION, SOLUTION INTRAVENOUS; SUBCUTANEOUS at 17:06

## 2021-07-23 RX ADMIN — INSULIN LISPRO 3 UNITS: 100 INJECTION, SOLUTION INTRAVENOUS; SUBCUTANEOUS at 08:20

## 2021-07-23 RX ADMIN — ENOXAPARIN SODIUM 40 MG: 40 INJECTION SUBCUTANEOUS at 08:20

## 2021-07-23 RX ADMIN — Medication 100 MG: at 08:21

## 2021-07-23 RX ADMIN — INSULIN LISPRO 6 UNITS: 100 INJECTION, SOLUTION INTRAVENOUS; SUBCUTANEOUS at 12:07

## 2021-07-23 RX ADMIN — ASPIRIN 81 MG: 81 TABLET, FILM COATED ORAL at 13:51

## 2021-07-23 RX ADMIN — ACETAMINOPHEN 650 MG: 325 TABLET ORAL at 23:38

## 2021-07-23 RX ADMIN — LISINOPRIL 10 MG: 10 TABLET ORAL at 13:51

## 2021-07-23 RX ADMIN — METFORMIN HYDROCHLORIDE 500 MG: 500 TABLET ORAL at 17:06

## 2021-07-23 ASSESSMENT — PAIN DESCRIPTION - PAIN TYPE: TYPE: ACUTE PAIN

## 2021-07-23 ASSESSMENT — PAIN DESCRIPTION - FREQUENCY: FREQUENCY: INTERMITTENT

## 2021-07-23 ASSESSMENT — PAIN SCALES - GENERAL
PAINLEVEL_OUTOF10: 0
PAINLEVEL_OUTOF10: 0
PAINLEVEL_OUTOF10: 6

## 2021-07-23 ASSESSMENT — PAIN DESCRIPTION - ONSET: ONSET: SUDDEN

## 2021-07-23 ASSESSMENT — PAIN DESCRIPTION - PROGRESSION: CLINICAL_PROGRESSION: NOT CHANGED

## 2021-07-23 ASSESSMENT — PAIN DESCRIPTION - DESCRIPTORS: DESCRIPTORS: ACHING;DISCOMFORT;HEADACHE

## 2021-07-23 ASSESSMENT — PAIN - FUNCTIONAL ASSESSMENT: PAIN_FUNCTIONAL_ASSESSMENT: ACTIVITIES ARE NOT PREVENTED

## 2021-07-23 ASSESSMENT — PAIN DESCRIPTION - LOCATION: LOCATION: HEAD

## 2021-07-23 NOTE — PROGRESS NOTES
[address] [facility]  [place]. [place]        Date:2021                                                  Patient Name: Krystian Buitrago    MRN: [id number]    : 1945    Room: [facility]0336-01      Evaluating OT: Jimenez Leon OTR/L #[id number]     Referring Provider and Specific Provider Orders/Date:      21  OT eval and treat Start: 21, End: 21, ONE TIME, Standing Count: 1 Occurrences, R      Melita Leung, DO        Placement Recommendation: Subacute vs HHOT if patient meets goals       Diagnosis:   1. Confusion    2. Elevated troponin    3. Cerebrovascular accident (CVA), unspecified mechanism ([facility] [address]         Surgery: none      Pertinent Medical History:     History reviewed. No pertinent past medical history. History reviewed. No pertinent surgical history.    Precautions:  Fall Risk, decreased cognition, poor historian      Assessment of current deficits    [x] Functional mobility  [x]ADLs  [x] Strength               [x]Cognition    [x] Functional transfers   [x] IADLs         [x] Safety Awareness   [x]Endurance    [] Fine Coordination              [x] Balance      [] Vision/perception   []Sensation     []Gross Motor Coordination  [] ROM  [] Delirium                   [] Motor Control     OT PLAN OF CARE   OT POC based on physician orders, patient diagnosis and results of clinical assessment    Frequency/Duration 1-3 days/wk for 2 weeks PRN     Specific OT Treatment Interventions to include:   * Instruction/training on adapted ADL techniques and AE recommendations to increase functional independence within precautions       * Training on energy conservation strategies, correct breathing pattern and techniques to improve independence/tolerance for self-care routine  * Functional transfer/mobility training/DME recommendations for increased independence, safety, and fall prevention  * Patient/Family education to increase follow through with safety techniques and functional independence  * Recommendation of environmental modifications for increased safety with functional transfers/mobility and ADLs  * Therapeutic exercise to improve motor endurance, ROM, and functional strength for ADLs/functional transfers  * Therapeutic activities to facilitate/challenge dynamic balance, stand tolerance for increased safety and independence with ADLs    Recommended Adaptive Equipment: TBD at rehab     Home Living: alone; apartment, resides on 8th floor, No steps to enter, elevator access   Bathroom set-up: Walk-in shower with grab bars         Equipment owned: cane and shower chair     Prior Level of Function: Independent with ADLs , Independent with IADLs; ambulated with cane     Driving: Not reported    Occupation: Not reported   Enjoys: Not reported      Pain Level: pt denied pain; Nursing notified. Cognition: A&O: 3/4; Follows 1-2 step directions   Memory: poor   Sequencing: poor   Problem solving: poor   Judgement/safety: poor    [facility]   AM-PAC Daily Activity Inpatient   How much help for putting on and taking off regular lower body clothing?: A Little  How much help for Bathing?: A Lot  How much help for Toileting?: A Lot  How much help for putting on and taking off regular upper body clothing?: A Little  How much help for taking care of personal grooming?: A Little  How much help for eating meals?: None  AM-[facility] Inpatient Daily Activity Raw Score: 17  AM-PAC Inpatient ADL T-Scale Score : 37.26  ADL Inpatient CMS 0-100% Score: 50.11  ADL Inpatient CMS G-Code Modifier : CK     Functional Assessment:    Initial Eval Status  Date: 7/23/21 Treatment Status  Date: STGs = LTGs  Time frame: 10-14 days   Feeding Independent   Independent    Grooming Minimal Assist and cueing for sequencing and problem solving for oral hygiene at bathroom sink. CGA to maintain balance while standing.    Independent UB Dressing Minimal Assist   Independent    LB Dressing Minimal Assist to doff/don socks while seated in chair. Increased time and effort needed. Independent    Bathing Moderate Assist   Independent    Toileting Moderate Assist   Independent    Bed Mobility  Supine to sit: Supervision   Sit to supine: Supervision   Supine to sit: Independent   Sit to supine: Independent    Functional Transfers Minimal Assist from EOB to wheeled walker. Transfer training with verbal cues for hand placement throughout session to improve safety. Moderate [place]    Functional Mobility Minimal Assist with wheeled walker to/from bathroom to improve balance, verbal cues for walker sequence and safety. Unsteadiness noted, however, not episodes of LOB. Tactile cueing needed for navigation of wheeled walker. Moderate [place]    Balance Sitting:     Static: fair     Dynamic: fair   Standing: poor with wheeled walker  Sitting:     Static: good     Dynamic: good   Standing: good  with wheeled walker   Activity Tolerance fair ; standing tolerance at bathroom sink up to x3 mins with no c/o dizziness or SOB. Increase standing tolerance >5 minutes for improved engagement with functional transfers and indep in ADLs   Visual/  Perceptual Glasses: Yes, at bedside however lenses broken. Reports changes in vision since admission: No     NA            Hand Dominance: Right     AROM (PROM) Strength Additional Info:    RUE  WFL 4-/5 good  and wfl FMC/dexterity noted during ADL tasks     LUE WFL 4-/5 good  and wfl FMC/dexterity noted during ADL tasks       Hearing: WFL   Sensation:  No c/o numbness or tingling  Tone: WFL   Edema: none    Comments: Nursing approved of therapy session. Upon arrival the patient was supine in bed. Pt educated on role of OT. At end of session, patient was supine in bed with call light and phone within reach, all lines and tubes intact.   Overall patient demonstrated decreased independence and safety during completion of ADL/functional transfer/mobility tasks. Pt limited by deficits in strength, activity tolerance, balance, and safety awareness. Pt would benefit from continued skilled OT to increase safety and independence with completion of ADL/IADL tasks for functional independence and quality of life. Treatment: OT treatment provided this date includes:   · Instruction, education and training on safe facilitation and adapted techniques for completion of ADLs. These include safe functional transfer techniques and energy conservation/work simplification for completion of ADLs. Education provided on hand/feet placement with walker and body mechanics for fall prevention. Cues for energy conservation and safety for in the home at [place], including modifications and DME. Extended time to complete all tasks, including skilled monitoring of patient's response during treatment session and vital signs. Prior to and at the end of session, environmental modifications / line management completed for patients safety and efficiency of treatment session. See above for further details. Rehab Potential: Good for established goals. Patient / Family Goal: go home       Patient and/or family were instructed on functional diagnosis, prognosis/goals and OT plan of care. Demonstrated good understanding.      Eval Complexity: Low    Time In: 2:11 PM  Time Out: 2:35 PM    Total Treatment Time: 0      Min Units   OT Eval Low 97165  X  1    OT Eval Medium 54599      OT Eval High 82071      OT Re-Eval [id number]            ADL/Self Care 89912     Therapeutic Activities 02332       Therapeutic Ex 62446       Orthotic Management 34299       Manual 07043     Neuro Re-Ed 93079       Non-Billable Time        Evaluation Time additionally includes thorough review of current medical information, gathering information on past medical history/social history and prior level of function, interpretation of standardized testing/informal observation of tasks, assessment of data and development of plan of care and goals.         Evaluating OT: Mikey Crigler OTR/L #[id number]

## 2021-07-23 NOTE — PROGRESS NOTES
Department of Internal Medicine  PN    [facility]  Admitting Physician: Dr. Courtney Menendez  Consultants: Dr. Dbebie Urias  Date of Service: 7/22/2021    CHIEF COMPLAINT: Altered mental status, ambulatory dysfunction, failure to thrive    HISTORY OF PRESENT ILLNESS:    Patient is 74-year-old male who presented to the ED due to altered mental status and ambulatory dysfunction. Patient states that he has been having issues with ambulation. He states that he has fallen multiple times in the last few months. He denies any loss of consciousness. States that he stumbles and trips which lead to his falls. As such she is less active. Additionally patient on exam is having trouble remembering what has occurred in the past.  He also has trouble speaking. Sentences do not come out smoothly. Although he does not slur his speech. According to social work patient was taking care of himself with help from a few friends. He has been in his own bills. However most recently when his  visited him patient was found to be living in more deplorable condition. He had not been taking care of his paperwork/bills. Additionally patient was confused. It appears that patient on my exam has improved since he has been admitted. Patient does admit to urinary frequency. Patient states he does not take his medications or he has not taken his medications in the last 2 months.    7/23/2021  Patient seen and examined on medical surgical floor. Patient is alert oriented to person place. Patient is very poor historian. Patient has a very hard time putting together accurate sentences. Patient denies though any chest or abdominal pain. He denies any unusual shortness of breath. BUN/creatinine 14/0.7. Blood sugars ranging in the mid 200s. Patient serial troponins have been trending down. Hemoglobin 14.6 today with WBC 6.7. Temperature 98.1 with heart rate of 73 with blood pressure 153/70. O2 sat 100% on room air.   Patient does not remember any of his home medications so we are getting in contact with the [place] for them to fax over his home medications. Pending MRI of the brain today. PAST MEDICAL Hx:  History reviewed. No pertinent past medical history. PAST SURGICAL Hx:   History reviewed. No pertinent surgical history. FAMILY Hx:  History reviewed. No pertinent family history. HOME MEDICATIONS:  Prior to Admission medications    Not on File       ALLERGIES:  Patient has no allergy information on record. SOCIAL Hx:  Social History     Socioeconomic History    Marital status:      Spouse name: Not on file    Number of children: Not on file    Years of education: Not on file    Highest education level: Not on file   Occupational History    Not on file   Tobacco Use    Smoking status: Never Smoker    Smokeless tobacco: Never Used   Substance and Sexual Activity    Alcohol use: Not on file    Drug use: Not on file    Sexual activity: Not on file   Other Topics Concern    Not on file   Social History Narrative    Not on file     Social Determinants of Health     Financial Resource Strain:     Difficulty of Paying Living Expenses:    Food Insecurity:     Worried About Running Out of Food in the Last Year:     [address] in the Last Year:    Transportation Needs:     Lack of Transportation (Medical):      Lack of Transportation (Non-Medical):    Physical Activity:     Days of Exercise per Week:     Minutes of Exercise per Session:    Stress:     Feeling of Stress :    Social Connections:     Frequency of Communication with Friends and Family:     Frequency of Social Gatherings with Friends and Family:     Attends Zoroastrian Services:     Active Member of Clubs or Organizations:     Attends Club or Organization Meetings:     Marital Status:    Intimate Partner Violence:     Fear of Current or Ex-Partner:     Emotionally Abused:     Physically Abused:     Sexually Abused:        ROS: Positive in bold  General:   Denies chills, fatigue, fever, malaise, night sweats or weight loss    Psychological:   Denies anxiety, disorientation or hallucinations    ENT:    Denies epistaxis, headaches, vertigo or visual changes    Cardiovascular:   Denies any chest pain, irregular heartbeats, or palpitations. No paroxysmal nocturnal dyspnea. Respiratory:   Denies shortness of breath, coughing, sputum production, hemoptysis, or wheezing. No orthopnea. Gastrointestinal:   Denies nausea, vomiting, diarrhea, or constipation. Denies any abdominal pain. Denies change in bowel habits or stools. Genito-Urinary:    Denies any urgency, frequency, hematuria. Voiding without difficulty. Musculoskeletal:   Denies joint pain, joint stiffness, joint swelling or muscle pain    Neurology:    Denies any headache or focal neurological deficits. No weakness or paresthesia. Derm:    Denies any rashes, ulcers, or excoriations. Denies bruising. Extremities:   Denies any lower extremity swelling or edema. PHYSICAL EXAM: Abnormal findings noted  VITALS:  Vitals:    07/22/21 2000   BP: (!) 174/76   Pulse:    Resp:    Temp:    SpO2:          CONSTITUTIONAL:    Awake, alert, cooperative, no apparent distress, and appears stated age    EYES:     EOMI, sclera clear, conjunctiva normal    ENT:    Normocephalic, atraumatic,  External ears without lesions. NECK:    Supple, symmetrical, trachea midline,  no JVD    HEMATOLOGIC/LYMPHATICS:    No cervical lymphadenopathy and no supraclavicular lymphadenopathy    LUNGS:    Symmetric. No increased work of breathing, good air exchange, clear to auscultation bilaterally, no wheezes, rhonchi, or rales,     CARDIOVASCULAR:    Normal apical impulse, regular rate and rhythm, normal S1 and S2, no S3 or S4, and no murmur noted    ABDOMEN:    soft, non-distended, non-tender    MUSCULOSKELETAL:    There is no redness, warmth, or swelling of the joints.       NEUROLOGIC: Awake, alert, oriented to name, place and time. SKIN:    No bruising or bleeding. No redness, warmth, or swelling    EXTREMITIES:    Peripheral pulses present. No edema, cyanosis, or swelling. LINES/CATHETERS     LABORATORY DATA:  CBC with Differential:    Lab Results   Component Value Date    WBC 6.7 07/23/2021    RBC 4.87 07/23/2021    HGB 14.6 07/23/2021    HCT 42.8 07/23/2021     07/23/2021    MCV 87.9 07/23/2021    MCH 30.0 07/23/2021    MCHC 34.1 07/23/2021    RDW 13.0 07/23/2021    SEGSPCT 66 04/28/2011    LYMPHOPCT 21.1 07/23/2021    MONOPCT 10.8 07/23/2021    BASOPCT 0.3 07/23/2021    MONOSABS 0.72 07/23/2021    LYMPHSABS 1.41 07/23/2021    EOSABS 0.08 07/23/2021    BASOSABS 0.02 07/23/2021     CMP:    Lab Results   Component Value Date     07/23/2021    K 3.5 07/23/2021    K 3.9 07/22/2021     07/23/2021    CO2 24 07/23/2021    BUN 14 07/23/2021    CREATININE 0.7 07/23/2021    GFRAA >60 07/23/2021    LABGLOM >60 07/23/2021    GLUCOSE 252 07/23/2021    GLUCOSE 142 04/29/2011    PROT 5.9 07/23/2021    LABALBU 3.4 07/23/2021    CALCIUM 9.0 07/23/2021    BILITOT 0.5 07/23/2021    ALKPHOS 102 07/23/2021    AST 17 07/23/2021    ALT 20 07/23/2021       ASSESSMENT/PLAN:  1. Encephalopathy possibly related to psychiatric versus neurologicversus medical condition  2. Elevated troponin  3. Grade 1 anterior subluxation of L5 with respect to S1  4. Ambulatory dysfunction with multiple falls in last few months  5. Urinary frequency  6. Failure to thrive  7. Medical noncompliance  8. Hyperglycemia  9. Hypertension      Patient presented to the ED from home due to altered mental status and altered behavior from baseline. Patient is a poor historian. States he has not been taking his medications for the last few months. However he did not know his prescribed medications either. At baseline he was able to take care of himself with help of friends.   However this has changed over the last

## 2021-07-23 NOTE — PROCEDURES
Prior imaging cleared by Dr Max Sr, [facility] for MRI screening due to patient mental status. Patient is cleared for MRI.

## 2021-07-23 NOTE — PROGRESS NOTES
Physical Therapy Initial Evaluation/Plan of Care    Room #:  0336/0336-01  Patient Name: Beatrice Kenyon  YOB: 1945  MRN: [id number]    Date of Service: 7/23/2021     Tentative placement recommendation: Subacute vs Home Health Physical Therapy if patient meets goals  Equipment recommendation: To be determined      Evaluating Physical Therapist: Adelita Logan, [address] [facility] #[id number]     Specific Provider Orders/Date/Referring Provider :   07/22/21 1930   PT eval and treat Start: 07/22/21 1930, End: 07/22/21 1930, ONE TIME, Standing Count: 1 Occurrences, R    David Abu, DO    Admitting Diagnosis:   Altered mental status [R41.82]     Visit Diagnoses       Codes    Confusion    -  Primary R41.0    Elevated troponin     R77.8    Cerebrovascular accident (CVA), unspecified mechanism ([facility] [address]     I63.9        Surgery: noen    Patient Active Problem List   Diagnosis    Altered mental status       ASSESSMENT of Current Deficits Patient exhibits decreased strength, balance and endurance impairing gait distance and tolerance to activity. Patient required minimal assist to walk in hallway with hand held assist. Patient ambulated a second time with wheeled walker, balance improved but still required minimal assist for safety and walker approximation. Patient presents with flexed posture and shuffling gait during ambulation. Patient requires continued skilled physical therapy to address concerns listed above for increased safety and function at discharge.           PHYSICAL THERAPY  PLAN OF CARE       Physical therapy plan of care is established based on physician order,  patient diagnosis and clinical assessment    Current Treatment Recommendations:    -Bed Mobility: Lower extremity exercises   -Sitting Balance: Incorporate reaching activities to activate trunk muscles   -Standing Balance: Perform strengthening exercises in standing to promote motor control with or without upper extremity support   -Transfers: Provide instruction on proper hand and foot position for adequate transfer of weight onto lower extremities and use of gait device  -Gait: Gait training and Standing activities to improve: base of support, weight shift, weight bearing      PT long term treatment goals are located in below grid    Patient and or family understand(s) diagnosis, prognosis, and plan of care. Frequency of treatments: Patient will be seen  daily. Prior Level of Function: Patient ambulated with cane   Rehab Potential: good  for baseline    Past medical history:   History reviewed. No pertinent past medical history. History reviewed. No pertinent surgical history. SUBJECTIVE:    Precautions: Up with assistance, falls , diabetic, poor historian   Social history: Patient lives alone  in a apartment . (8th floor with elevator) with No steps  to enter  [facility] in shower . Equipment owned: Cane and Shower chair,      [address]   How much difficulty turning over in bed?: A Little  How much difficulty sitting down on / standing up from a chair with arms?: A Little  How much difficulty moving from lying on back to sitting on side of bed?: A Little  How much help from another person moving to and from a bed to a chair?: A Little  How much help from another person needed to walk in hospital room?: A Little  How much help from another person for climbing 3-5 steps with a railing?: A Lot  AM-PAC Inpatient Mobility Raw Score : 17  AM-PAC Inpatient T-Scale Score : 42.13  Mobility Inpatient CMS 0-100% Score: 50.57  Mobility Inpatient CMS G-Code Modifier : CK    Nursing cleared patient for PT evaluation. The admitting diagnosis and active problem list as listed above have been reviewed prior to the initiation of this evaluation. OBJECTIVE;   Initial Evaluation  Date: 7/23/2021 Treatment Date:     Short Term/ Long Term   Goals   Was pt agreeable to Eval/treatment?  Yes   To be met in 4 days   Pain level   0/10       Bed Mobility    Rolling: Supervision    Supine to sit: Minimal assist of 1   Sit to supine: Minimal assist of 1   Scooting: Minimal assist of 1   Rolling: Independent   Supine to sit: Independent   Sit to supine: Independent   Scooting: Independent    Transfers Sit to stand: Minimal assist of 1   Sit to stand: Independent    Ambulation    50 feet using  hand held assist with Minimal assist of 1   for balance and upright shuffling gait, flexed posture   50 feet using  wheeled walker with Minimal assist of 1   cues for upright posture, walker approximation and safety     100 feet using  least restrictive device with Independent    Stair negotiation: ascended and descended   Not assessed       ROM Within functional limits      Strength BUE:  4+/5  RLE:  4/5  LLE:  4/5  Increase strength in affected mm groups by 1/3 grade   Balance Sitting EOB:  fair +  Dynamic Standing:  fair wheeled walker    Sitting EOB:  good   Dynamic Standing: good      Patient is Alert & Oriented x person, time and situation and follows one step directions   Sensation:  Patient  denies numbness/tingling     Edema:  no   Endurance: fair  , lower extremity fatigue limiting gait distance     Vitals: room air   Blood Pressure at rest  Blood Pressure during session    Heart Rate at rest  Heart Rate during session    SPO2 at rest 96%  SPO2 during session %     Patient education  Patient educated on role of Physical Therapy, risks of immobility, safety and plan of care,  importance of mobility while in hospital , purse lip breathing, ankle pumps, quad set and glut set for edema control, blood clot prevention, importance and purpose of adaptive device and adjusted to proper height for the patient.  and safety      Patient response to education:   Pt verbalized understanding Pt demonstrated skill Pt requires further education in this area   Yes Partial Yes      Treatment:  Patient practiced and was instructed/facilitated in the following treatment: Patient assisted to EOB. Sat edge of bed 10 minutes with Supervision  to increase dynamic sitting balance and activity tolerance. Patient stood and amb to sink to wash hands and then out into hallway and back to EOB with HHA. Patient stood again and amb with wheeled walker 50 ft and returned to EOB. Patient waiting to go down to MRI shortly, requesting to stay sitting up on EOB, nursing notified. Therapeutic Exercises:  ankle pumps, glut sets, long arc quad and seated marching  x 10 reps. At end of session, patient sitting edge of bed with alarm call light and phone within reach,  all lines and tubes intact, nursing notified. Patient would benefit from continued skilled Physical Therapy to improve functional independence and quality of life. Patient's/ family goals   get stronger      Time in  0847  Time out  0907    Total Treatment Time  0 minutes    Evaluation time includes thorough review of current medical information, gathering information on past medical history/social history and prior level of function, completion of standardized testing/informal observation of tasks, assessment of data, and development of Plan of care and goals.      CPT codes:  Low Complexity PT evaluation (67672)  No treatment    Sung Monteiro, PT

## 2021-07-23 NOTE — CONSULTS
History Of Present Illness: Patient is 72-year-old male who presented to the ED due to altered mental status and ambulatory dysfunction. Patient states that he has been having issues with ambulation. He states that he has fallen multiple times in the last few months. He denies any loss of consciousness. States that he stumbles and trips which lead to his falls. As such she is less active. Additionally patient on exam is having trouble remembering what has occurred in the past.  He also has trouble speaking. Sentences do not come out smoothly. Although he does not slur his speech. According to social work patient was taking care of himself with help from a few friends. He has been in his own bills. However most recently when his  visited him patient was found to be living in more deplorable condition. He had not been taking care of his paperwork/bills. Additionally patient was confused. It appears that patient on my exam has improved since he has been admitted. Patient does admit to urinary frequency. Patient states he does not take his medications or he has not taken his medications in the last 2 months. As above per hospitalist    The patient is a 78 y.o. male with significant past medical history of see below who presents with above.       The patient has the following symptoms:    Change in level of consciousness: alert    New Weakness: no    Numbness or Tingling: no    Difficulty Swallowing: no    Current Medications:   Scheduled Meds:   sodium chloride  1,000 mL Intravenous Once    sodium chloride flush  5-40 mL Intravenous 2 times per day    enoxaparin  40 mg Subcutaneous Daily    insulin lispro  0-6 Units Subcutaneous TID WC    insulin lispro  0-3 Units Subcutaneous Nightly    aspirin  324 mg Oral Once     Continuous Infusions:   dextrose      sodium chloride       PRN Meds:glucose, dextrose, glucagon (rDNA), dextrose, sodium chloride flush, sodium chloride, polyethylene glycol, acetaminophen **OR** acetaminophen    Allergies:  Patient has no allergy information on record. Social History:   TOBACCO:   reports that he has never smoked. He has never used smokeless tobacco.  ETOH:   has no history on file for alcohol use. Past Medical History:    History reviewed. No pertinent past medical history. Past Surgical History:    History reviewed. No pertinent surgical history. Outside reports reviewed: ER records, historical medical records, lab reports and radiology reports. Patient's medications, allergies, past medical, surgical, social and family histories were reviewed and updated as appropriate. Review of Systems  A comprehensive review of systems was negative except for:       Objective:     Neuro exam 152/70 p 72 t 98  General: normal orientation and alertness. lacks insight into hospitalization and tendency to perseverate \"need to be with friends in [place]"  Cranial nerve testing was normal.  Funduscopic eye exam revealed not testable. Motor exam: 5-/5. Deep tendon reflexes were absent bilaterally. Plantar responses were flexor bilaterally. Cerebellar exam noted finger to nose without dysmetria. Sensation was decreased in the lower extremities.       Assessment:   Altered mental state of uncertain etiology  Head ct negative      Plan:   Mri/mra brain/carotids  thiamine  Suggest psychiatric evaluation  PT/?rehab

## 2021-07-23 NOTE — PROGRESS NOTES
([facility]     I63.9           PATIENT REPORT/COMPLAINT: does not report difficulty swallowing     meal tray present during evaluation     PRIOR LEVEL OF SWALLOW FUNCTION:    PAST HISTORY OF DYSPHAGIA?: none reported    Home diet: Regular consistency solids with  thin liquids  PROCEDURE:  Consistencies Administered During the Evaluation   Liquids: thin liquid   Solids:  pureed foods and soft solid foods      Method of Intake:   cup, straw, spoon  Self fed      Position:   Seated, upright    CLINICAL ASSESSMENT  Oral Stage: The oral stage of swallowing was within functional limits      Pharyngeal Stage:    No signs of aspiration were noted during this evaluation however, silent aspiration cannot be ruled out at bedside. If silent aspiration is suspected, a Videofluoroscopic Study of Swallowing (MBS) is recommended and requires a physician order. Cognition:   Within functional limits for this exam    Oral Peripheral Examination   Adequate lingual/labial strength     Current Respiratory Status    room air     Parameters of Speech Production  Respiration:  Adequate for speech production  Quality:   Within functional limits  Intensity: Within functional limits    Volitional Swallow: Present    Volitional Cough:    Present    Pain: No pain reported. EDUCATION:   The Speech Language Pathologist (SLP) completed education regarding results of evaluation and that intervention is not warranted at this time. Learner: Patient  Education:  Reviewed results and recommendations of this evaluation  Evaluation of Education: Verbalizes understanding    This plan will be re-evaluated and revised in 1 week  if warranted. CPT code:  11477  bedside swallow eval      [x]The admitting diagnosis and active problem list, have been reviewed prior to initiation of this evaluation.         ACTIVE PROBLEM LIST:   Patient Active Problem List   Diagnosis    Altered mental status           Lamberto Latham MSCCC/SLP  Speech Language Pathologist  [id number]

## 2021-07-23 NOTE — CARE COORDINATION
CM note: Met with patient for transition of care planning. Very difficult to assess patient's current living situation d/t his altered mental status. Pt states that he lives alone in an apartment. Unable to tell CM where he lives, does know his apartment number. He states that he own a cane, used to walk to the grocery store but fell three times over three months so he states he has friends who he calls and they drive him to the store yet when CM asks for names and numbers to call if we needed to call someone he is unable to recall. Asked patient about China Harry and Brittney Lee who are listed in ER SW note, patient did not know who China Harry was ([facility] SW) but after several minutes was able to confirm that Brittney Lee was his son, stated it was ok for hospital to call Brtitney Lee if needed in an emergency situation. No other contact people listed. Presented to patient recommendation for his to go for rehab prior to returning to his apartment but patient is no in agreement, however, CM is not positive that patient is capable of making this decision at this time. Per SW at [facility], confusion is new for patient. Pt did state that he was a . Call placed to [facility], patient is not service connected and does not have travel benefits. Plan is unclear at this time.

## 2021-07-23 NOTE — PROGRESS NOTES
Speech Language Pathology      NAME:  Kristi Moulton  :  1945  DATE: 2021  ROOM:  0336/0336-01    Order received. Chart reviewed. Pt unavailable at this time due to:  [] HOLD per RN, Pt   [x] Off unit for testing/ procedure    [] With medical staff   [] Declined intervention  [] Sleeping/ Lethargic   [] Other:     Will re-attempt later this date as able. Thank you.        Altered mental status [R41.82]        Flo Penmariela MSCCC/SLP  Speech Language Pathologist  [id number]

## 2021-07-24 LAB
ANION GAP SERPL CALCULATED.3IONS-SCNC: 11 MMOL/L (ref 7–16)
BUN BLDV-MCNC: 20 MG/DL (ref 6–23)
CALCIUM SERPL-MCNC: 8.9 MG/DL (ref 8.6–10.2)
CHLORIDE BLD-SCNC: 102 MMOL/L (ref 98–107)
CO2: 26 MMOL/L (ref 22–29)
CREAT SERPL-MCNC: 0.7 MG/DL (ref 0.7–1.2)
GFR AFRICAN AMERICAN: >60
GFR NON-AFRICAN AMERICAN: >60 ML/MIN/1.73
GLUCOSE BLD-MCNC: 250 MG/DL (ref 74–99)
METER GLUCOSE: 212 MG/DL (ref 74–99)
METER GLUCOSE: 242 MG/DL (ref 74–99)
METER GLUCOSE: 261 MG/DL (ref 74–99)
METER GLUCOSE: 302 MG/DL (ref 74–99)
POTASSIUM SERPL-SCNC: 3.6 MMOL/L (ref 3.5–5)
SODIUM BLD-SCNC: 139 MMOL/L (ref 132–146)

## 2021-07-24 PROCEDURE — 6370000000 HC RX 637 (ALT 250 FOR IP): Performed by: INTERNAL MEDICINE

## 2021-07-24 PROCEDURE — 6370000000 HC RX 637 (ALT 250 FOR IP): Performed by: PSYCHIATRY & NEUROLOGY

## 2021-07-24 PROCEDURE — 96372 THER/PROPH/DIAG INJ SC/IM: CPT

## 2021-07-24 PROCEDURE — 36415 COLL VENOUS BLD VENIPUNCTURE: CPT

## 2021-07-24 PROCEDURE — 2580000003 HC RX 258: Performed by: INTERNAL MEDICINE

## 2021-07-24 PROCEDURE — G0378 HOSPITAL OBSERVATION PER HR: HCPCS

## 2021-07-24 PROCEDURE — 80048 BASIC METABOLIC PNL TOTAL CA: CPT

## 2021-07-24 PROCEDURE — 6360000002 HC RX W HCPCS: Performed by: INTERNAL MEDICINE

## 2021-07-24 PROCEDURE — 82962 GLUCOSE BLOOD TEST: CPT

## 2021-07-24 RX ORDER — ALOGLIPTIN 12.5 MG/1
12.5 TABLET, FILM COATED ORAL DAILY
Status: DISCONTINUED | OUTPATIENT
Start: 2021-07-24 | End: 2021-07-26 | Stop reason: HOSPADM

## 2021-07-24 RX ADMIN — Medication 10 ML: at 08:11

## 2021-07-24 RX ADMIN — LISINOPRIL 10 MG: 10 TABLET ORAL at 08:11

## 2021-07-24 RX ADMIN — ALOGLIPTIN 12.5 MG: 12.5 TABLET, FILM COATED ORAL at 10:53

## 2021-07-24 RX ADMIN — METFORMIN HYDROCHLORIDE 500 MG: 500 TABLET ORAL at 16:27

## 2021-07-24 RX ADMIN — ASPIRIN 81 MG: 81 TABLET, FILM COATED ORAL at 08:11

## 2021-07-24 RX ADMIN — ACETAMINOPHEN 650 MG: 325 TABLET ORAL at 10:54

## 2021-07-24 RX ADMIN — INSULIN LISPRO 1 UNITS: 100 INJECTION, SOLUTION INTRAVENOUS; SUBCUTANEOUS at 20:10

## 2021-07-24 RX ADMIN — INSULIN LISPRO 3 UNITS: 100 INJECTION, SOLUTION INTRAVENOUS; SUBCUTANEOUS at 16:27

## 2021-07-24 RX ADMIN — INSULIN LISPRO 2 UNITS: 100 INJECTION, SOLUTION INTRAVENOUS; SUBCUTANEOUS at 08:13

## 2021-07-24 RX ADMIN — Medication 100 MG: at 08:11

## 2021-07-24 RX ADMIN — ENOXAPARIN SODIUM 40 MG: 40 INJECTION SUBCUTANEOUS at 08:11

## 2021-07-24 RX ADMIN — METFORMIN HYDROCHLORIDE 500 MG: 500 TABLET ORAL at 08:11

## 2021-07-24 RX ADMIN — INSULIN LISPRO 4 UNITS: 100 INJECTION, SOLUTION INTRAVENOUS; SUBCUTANEOUS at 11:41

## 2021-07-24 ASSESSMENT — PAIN SCALES - GENERAL
PAINLEVEL_OUTOF10: 0
PAINLEVEL_OUTOF10: 4
PAINLEVEL_OUTOF10: 0
PAINLEVEL_OUTOF10: 7
PAINLEVEL_OUTOF10: 0

## 2021-07-24 NOTE — PROGRESS NOTES
Department of Internal Medicine  PN    [facility]  Admitting Physician: Dr. Tita Dennison  Consultants: Dr. Rama Sullivan  Date of Service: 7/22/2021    CHIEF COMPLAINT: Altered mental status, ambulatory dysfunction, failure to thrive    HISTORY OF PRESENT ILLNESS:    Patient is 53-year-old male who presented to the ED due to altered mental status and ambulatory dysfunction. Patient states that he has been having issues with ambulation. He states that he has fallen multiple times in the last few months. He denies any loss of consciousness. States that he stumbles and trips which lead to his falls. As such she is less active. Additionally patient on exam is having trouble remembering what has occurred in the past.  He also has trouble speaking. Sentences do not come out smoothly. Although he does not slur his speech. According to social work patient was taking care of himself with help from a few friends. He has been in his own bills. However most recently when his  visited him patient was found to be living in more deplorable condition. He had not been taking care of his paperwork/bills. Additionally patient was confused. It appears that patient on my exam has improved since he has been admitted. Patient does admit to urinary frequency. Patient states he does not take his medications or he has not taken his medications in the last 2 months.    7/23/2021  Patient seen and examined on medical surgical floor. Patient is alert oriented to person place. Patient is very poor historian. Patient has a very hard time putting together accurate sentences. Patient denies though any chest or abdominal pain. He denies any unusual shortness of breath. BUN/creatinine 14/0.7. Blood sugars ranging in the mid 200s. Patient serial troponins have been trending down. Hemoglobin 14.6 today with WBC 6.7. Temperature 98.1 with heart rate of 73 with blood pressure 153/70. O2 sat 100% on room air.   Patient does not remember any of his home medications so we are getting in contact with the [address] for them to fax over his home medications. Pending MRI of the brain today. 7/24/2021  Patient seen and examined on medical surgical floor. Patient complaining of some low back pain which has been there since his fall. Patient denies any chest or abdominal pain. Patient is somewhat of a poor historian but seems to be accurate answering simple questions. MRI, MRA of the head and neck reviewed with no significant stenosis or acute infarction. 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 There is a small chronic lacunar infarct in the right centrum semiovale and right temporal periventricular white matter. ,BUN/creatinine 20/0.7. Blood sugars ranging 242-133 with patient's hemoglobin A1c of 11.5. Speech therapy note reviewed. Temperature 98.6 with heart rate 60 and blood pressure currently 151/67. O2 sat 96% on room air at rest.  Urine output is good. PT/OT notes reviewed. PAST MEDICAL Hx:  History reviewed. No pertinent past medical history. PAST SURGICAL Hx:   History reviewed. No pertinent surgical history. FAMILY Hx:  History reviewed. No pertinent family history. HOME MEDICATIONS:  Prior to Admission medications    Not on File       ALLERGIES:  Patient has no allergy information on record.     SOCIAL Hx:  Social History     Socioeconomic History    Marital status:      Spouse name: Not on file    Number of children: Not on file    Years of education: Not on file    Highest education level: Not on file   Occupational History    Not on file   Tobacco Use    Smoking status: Never Smoker    Smokeless tobacco: Never Used   Substance and Sexual Activity    Alcohol use: Not on file    Drug use: Not on file    Sexual activity: Not on file   Other Topics Concern    Not on file   Social History Narrative    Not on file     Social Determinants of Health     Financial Resource Strain:     Difficulty of Paying Living Expenses:    Food Insecurity:     Worried About Running Out of Food in the Last Year:     [address] in the Last Year:    Transportation Needs:     Lack of Transportation (Medical):  Lack of Transportation (Non-Medical):    Physical Activity:     Days of Exercise per Week:     Minutes of Exercise per Session:    Stress:     Feeling of Stress :    Social Connections:     Frequency of Communication with Friends and Family:     Frequency of Social Gatherings with Friends and Family:     Attends Rastafari Services:     Active Member of Clubs or Organizations:     Attends Club or Organization Meetings:     Marital Status:    Intimate Partner Violence:     Fear of Current or Ex-Partner:     Emotionally Abused:     Physically Abused:     Sexually Abused:        ROS: Positive in bold  General:   Denies chills, fatigue, fever, malaise, night sweats or weight loss    Psychological:   Denies anxiety, disorientation or hallucinations    ENT:    Denies epistaxis, headaches, vertigo or visual changes    Cardiovascular:   Denies any chest pain, irregular heartbeats, or palpitations. No paroxysmal nocturnal dyspnea. Respiratory:   Denies shortness of breath, coughing, sputum production, hemoptysis, or wheezing. No orthopnea. Gastrointestinal:   Denies nausea, vomiting, diarrhea, or constipation. Denies any abdominal pain. Denies change in bowel habits or stools. Genito-Urinary:    Denies any urgency, frequency, hematuria. Voiding without difficulty. Musculoskeletal:   Denies joint pain, joint stiffness, joint swelling or muscle pain    Neurology:    Denies any headache or focal neurological deficits. No weakness or paresthesia. Derm:    Denies any rashes, ulcers, or excoriations. Denies bruising. Extremities:   Denies any lower extremity swelling or edema.       PHYSICAL EXAM: Abnormal findings noted  VITALS:  Vitals:    07/24/21 0400   BP: (!) 151/67   Pulse: 60   Resp: 16   Temp: 98.6 °F (37 °C)   SpO2: 96%         CONSTITUTIONAL:    Awake, alert, cooperative, no apparent distress, and appears stated age    EYES:     EOMI, sclera clear, conjunctiva normal    ENT:    Normocephalic, atraumatic,  External ears without lesions. NECK:    Supple, symmetrical, trachea midline,  no JVD    HEMATOLOGIC/LYMPHATICS:    No cervical lymphadenopathy and no supraclavicular lymphadenopathy    LUNGS:    Symmetric. No increased work of breathing, good air exchange, clear to auscultation bilaterally, no wheezes, rhonchi, or rales,     CARDIOVASCULAR:    Normal apical impulse, regular rate and rhythm, normal S1 and S2, no S3 or S4, and no murmur noted    ABDOMEN:    soft, non-distended, non-tender    MUSCULOSKELETAL:    There is no redness, warmth, or swelling of the joints. NEUROLOGIC:    Awake, alert, oriented to name, place and time. SKIN:    No bruising or bleeding. No redness, warmth, or swelling    EXTREMITIES:    Peripheral pulses present. No edema, cyanosis, or swelling.     LINES/CATHETERS     LABORATORY DATA:  CBC with Differential:    Lab Results   Component Value Date    WBC 6.7 07/23/2021    RBC 4.87 07/23/2021    HGB 14.6 07/23/2021    HCT 42.8 07/23/2021     07/23/2021    MCV 87.9 07/23/2021    MCH 30.0 07/23/2021    MCHC 34.1 07/23/2021    RDW 13.0 07/23/2021    SEGSPCT 66 04/28/2011    LYMPHOPCT 21.1 07/23/2021    MONOPCT 10.8 07/23/2021    BASOPCT 0.3 07/23/2021    MONOSABS 0.72 07/23/2021    LYMPHSABS 1.41 07/23/2021    EOSABS 0.08 07/23/2021    BASOSABS 0.02 07/23/2021     CMP:    Lab Results   Component Value Date     07/24/2021    K 3.6 07/24/2021    K 3.9 07/22/2021     07/24/2021    CO2 26 07/24/2021    BUN 20 07/24/2021    CREATININE 0.7 07/24/2021    GFRAA >60 07/24/2021    LABGLOM >60 07/24/2021    GLUCOSE 250 07/24/2021    GLUCOSE 142 04/29/2011    PROT 5.9 07/23/2021    LABALBU 3.4 07/23/2021    CALCIUM 8.9 07/24/2021    BILITOT 0.5 07/23/2021    ALKPHOS 102 07/23/2021    AST 17 07/23/2021    ALT 20 07/23/2021       ASSESSMENT/PLAN:  1. Encephalopathy possibly related to psychiatric versus neurologicversus medical condition  2. Elevated troponin  3. Grade 1 anterior subluxation of L5 with respect to S1  4. Ambulatory dysfunction with multiple falls in last few months  5. Urinary frequency  6. Failure to thrive  7. Medical noncompliance  8. Non-insulin-dependent diabetes mellitus type 2-hemoglobin A1c 11.5  9. Hypertension      Patient presented to the ED from home due to altered mental status and altered behavior from baseline. Patient is a poor historian. States he has not been taking his medications for the last few months. However he did not know his prescribed medications either. At baseline he was able to take care of himself with help of friends. However this has changed over the last 1 to 2 months. He is alert oriented x3. We will try to get in contact with any acquaintances to inquire more about him medical/clinical history. We will obtain medical records from the [place]. Social work to be consulted as well. Neurology to be consulted for altered mental status as well.     Pending home medication from the [place]  PT/OT  Activity level is pending evaluation with above  Glucoscans 4 times daily with sliding scale insulin  Blood pressure spine standing  Metformin 500 mg twice daily  Nesina 12.5 mg daily  Consult  for discharge [address] [facility], DO  9:08 AM  7/24/2021

## 2021-07-25 LAB
METER GLUCOSE: 148 MG/DL (ref 74–99)
METER GLUCOSE: 154 MG/DL (ref 74–99)
METER GLUCOSE: 217 MG/DL (ref 74–99)
METER GLUCOSE: 300 MG/DL (ref 74–99)

## 2021-07-25 PROCEDURE — 6370000000 HC RX 637 (ALT 250 FOR IP): Performed by: INTERNAL MEDICINE

## 2021-07-25 PROCEDURE — 1200000000 HC SEMI PRIVATE

## 2021-07-25 PROCEDURE — 2580000003 HC RX 258: Performed by: INTERNAL MEDICINE

## 2021-07-25 PROCEDURE — 6370000000 HC RX 637 (ALT 250 FOR IP): Performed by: PSYCHIATRY & NEUROLOGY

## 2021-07-25 PROCEDURE — 6360000002 HC RX W HCPCS: Performed by: INTERNAL MEDICINE

## 2021-07-25 PROCEDURE — 82962 GLUCOSE BLOOD TEST: CPT

## 2021-07-25 RX ADMIN — LISINOPRIL 10 MG: 10 TABLET ORAL at 08:37

## 2021-07-25 RX ADMIN — METFORMIN HYDROCHLORIDE 500 MG: 500 TABLET ORAL at 08:37

## 2021-07-25 RX ADMIN — INSULIN LISPRO 4 UNITS: 100 INJECTION, SOLUTION INTRAVENOUS; SUBCUTANEOUS at 11:21

## 2021-07-25 RX ADMIN — INSULIN LISPRO 1 UNITS: 100 INJECTION, SOLUTION INTRAVENOUS; SUBCUTANEOUS at 20:43

## 2021-07-25 RX ADMIN — Medication 100 MG: at 08:37

## 2021-07-25 RX ADMIN — ASPIRIN 81 MG: 81 TABLET, FILM COATED ORAL at 08:37

## 2021-07-25 RX ADMIN — INSULIN LISPRO 1 UNITS: 100 INJECTION, SOLUTION INTRAVENOUS; SUBCUTANEOUS at 08:41

## 2021-07-25 RX ADMIN — ALOGLIPTIN 12.5 MG: 12.5 TABLET, FILM COATED ORAL at 08:37

## 2021-07-25 RX ADMIN — METFORMIN HYDROCHLORIDE 500 MG: 500 TABLET ORAL at 17:00

## 2021-07-25 RX ADMIN — INSULIN LISPRO 1 UNITS: 100 INJECTION, SOLUTION INTRAVENOUS; SUBCUTANEOUS at 17:01

## 2021-07-25 RX ADMIN — Medication 10 ML: at 21:00

## 2021-07-25 RX ADMIN — ENOXAPARIN SODIUM 40 MG: 40 INJECTION SUBCUTANEOUS at 08:37

## 2021-07-25 RX ADMIN — Medication 10 ML: at 08:44

## 2021-07-25 ASSESSMENT — PAIN SCALES - GENERAL
PAINLEVEL_OUTOF10: 0
PAINLEVEL_OUTOF10: 0

## 2021-07-25 NOTE — PROGRESS NOTES
Department of Internal Medicine  PN    [facility]  Admitting Physician: Dr. Nahomy Xie  Consultants: Dr. Rodrick Cisneros  Date of Service: 7/22/2021    CHIEF COMPLAINT: Altered mental status, ambulatory dysfunction, failure to thrive    HISTORY OF PRESENT ILLNESS:    Patient is 68-year-old male who presented to the ED due to altered mental status and ambulatory dysfunction. Patient states that he has been having issues with ambulation. He states that he has fallen multiple times in the last few months. He denies any loss of consciousness. States that he stumbles and trips which lead to his falls. As such she is less active. Additionally patient on exam is having trouble remembering what has occurred in the past.  He also has trouble speaking. Sentences do not come out smoothly. Although he does not slur his speech. According to social work patient was taking care of himself with help from a few friends. He has been in his own bills. However most recently when his  visited him patient was found to be living in more deplorable condition. He had not been taking care of his paperwork/bills. Additionally patient was confused. It appears that patient on my exam has improved since he has been admitted. Patient does admit to urinary frequency. Patient states he does not take his medications or he has not taken his medications in the last 2 months.    7/23/2021  Patient seen and examined on medical surgical floor. Patient is alert oriented to person place. Patient is very poor historian. Patient has a very hard time putting together accurate sentences. Patient denies though any chest or abdominal pain. He denies any unusual shortness of breath. BUN/creatinine 14/0.7. Blood sugars ranging in the mid 200s. Patient serial troponins have been trending down. Hemoglobin 14.6 today with WBC 6.7. Temperature 98.1 with heart rate of 73 with blood pressure 153/70. O2 sat 100% on room air.   Patient does not remember any of his home medications so we are getting in contact with the [address] [place] [address] for them to fax over his home medications. Pending MRI of the brain today. 7/24/2021  Patient seen and examined on medical surgical floor. Patient complaining of some low back pain which has been there since his fall. Patient denies any chest or abdominal pain. Patient is somewhat of a poor historian but seems to be accurate answering simple questions. MRI, MRA of the head and neck reviewed with no significant stenosis or acute infarction. 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 There is a small chronic lacunar infarct in the right centrum semiovale and right temporal periventricular white matter. ,BUN/creatinine 20/0.7. Blood sugars ranging 242-133 with patient's hemoglobin A1c of 11.5. Speech therapy note reviewed. Temperature 98.6 with heart rate 60 and blood pressure currently 151/67. O2 sat 96% on room air at rest.  Urine output is good. PT/OT notes reviewed. 7/25/2021  Patient seen examined on general medical floor. Patient can is very alert oriented and pleasant. Patient does still has trouble finishing a sentence without significant word searching. Patient denies any problem with chest pain, abdominal pain, nausea/vomiting or unusual shortness of breath. Patient still has some lower back pain from his fall. Blood sugars range 154-302. Temperature 98.2 with heart rate of 65 blood pressure ranges 140//69. Urine output ranges 350-800 cc a shift. We still have not received records from the [address] [place] [address] clinic. Pending repeat PT evaluation. PAST MEDICAL Hx:  History reviewed. No pertinent past medical history. PAST SURGICAL Hx:   History reviewed. No pertinent surgical history. FAMILY Hx:  History reviewed. No pertinent family history. HOME MEDICATIONS:  Prior to Admission medications    Not on File       ALLERGIES:  Patient has no allergy information on record.     SOCIAL Hx:  Social History     Socioeconomic History    Marital status:      Spouse name: Not on file    Number of children: Not on file    Years of education: Not on file    Highest education level: Not on file   Occupational History    Not on file   Tobacco Use    Smoking status: Never Smoker    Smokeless tobacco: Never Used   Substance and Sexual Activity    Alcohol use: Not on file    Drug use: Not on file    Sexual activity: Not on file   Other Topics Concern    Not on file   Social History Narrative    Not on file     Social Determinants of Health     Financial Resource Strain:     Difficulty of Paying Living Expenses:    Food Insecurity:     Worried About Running Out of Food in the Last Year:     [address] in the Last Year:    Transportation Needs:     Lack of Transportation (Medical):  Lack of Transportation (Non-Medical):    Physical Activity:     Days of Exercise per Week:     Minutes of Exercise per Session:    Stress:     Feeling of Stress :    Social Connections:     Frequency of Communication with Friends and Family:     Frequency of Social Gatherings with Friends and Family:     Attends Muslim Services:     Active Member of Clubs or Organizations:     Attends Club or Organization Meetings:     Marital Status:    Intimate Partner Violence:     Fear of Current or Ex-Partner:     Emotionally Abused:     Physically Abused:     Sexually Abused:        ROS: Positive in bold  General:   Denies chills, fatigue, fever, malaise, night sweats or weight loss    Psychological:   Denies anxiety, disorientation or hallucinations    ENT:    Denies epistaxis, headaches, vertigo or visual changes    Cardiovascular:   Denies any chest pain, irregular heartbeats, or palpitations. No paroxysmal nocturnal dyspnea. Respiratory:   Denies shortness of breath, coughing, sputum production, hemoptysis, or wheezing. No orthopnea.     Gastrointestinal:   Denies nausea, vomiting, diarrhea, or constipation. Denies any abdominal pain. Denies change in bowel habits or stools. Genito-Urinary:    Denies any urgency, frequency, hematuria. Voiding without difficulty. Musculoskeletal:   Denies joint pain, joint stiffness, joint swelling or muscle pain    Neurology:    Denies any headache or focal neurological deficits. No weakness or paresthesia. Derm:    Denies any rashes, ulcers, or excoriations. Denies bruising. Extremities:   Denies any lower extremity swelling or edema. PHYSICAL EXAM: Abnormal findings noted  VITALS:  Vitals:    07/25/21 0515   BP: (!) 175/69   Pulse: 65   Resp: 16   Temp: 98.2 °F (36.8 °C)   SpO2:          CONSTITUTIONAL:    Awake, alert, cooperative, no apparent distress, and appears stated age    EYES:     EOMI, sclera clear, conjunctiva normal    ENT:    Normocephalic, atraumatic,  External ears without lesions. NECK:    Supple, symmetrical, trachea midline,  no JVD    HEMATOLOGIC/LYMPHATICS:    No cervical lymphadenopathy and no supraclavicular lymphadenopathy    LUNGS:    Symmetric. No increased work of breathing, good air exchange, clear to auscultation bilaterally, no wheezes, rhonchi, or rales,     CARDIOVASCULAR:    Normal apical impulse, regular rate and rhythm, normal S1 and S2, no S3 or S4, and no murmur noted    ABDOMEN:    soft, non-distended, non-tender    MUSCULOSKELETAL:    There is no redness, warmth, or swelling of the joints. NEUROLOGIC:    Awake, alert, oriented to name, place and time. SKIN:    No bruising or bleeding. No redness, warmth, or swelling    EXTREMITIES:    Peripheral pulses present. No edema, cyanosis, or swelling.     LINES/CATHETERS     LABORATORY DATA:  CBC with Differential:    Lab Results   Component Value Date    WBC 6.7 07/23/2021    RBC 4.87 07/23/2021    HGB 14.6 07/23/2021    HCT 42.8 07/23/2021     07/23/2021    MCV 87.9 07/23/2021    MCH 30.0 07/23/2021    MCHC 34.1 07/23/2021 RDW 13.0 07/23/2021    SEGSPCT 66 04/28/2011    LYMPHOPCT 21.1 07/23/2021    MONOPCT 10.8 07/23/2021    BASOPCT 0.3 07/23/2021    MONOSABS 0.72 07/23/2021    LYMPHSABS 1.41 07/23/2021    EOSABS 0.08 07/23/2021    BASOSABS 0.02 07/23/2021     CMP:    Lab Results   Component Value Date     07/24/2021    K 3.6 07/24/2021    K 3.9 07/22/2021     07/24/2021    CO2 26 07/24/2021    BUN 20 07/24/2021    CREATININE 0.7 07/24/2021    GFRAA >60 07/24/2021    LABGLOM >60 07/24/2021    GLUCOSE 250 07/24/2021    GLUCOSE 142 04/29/2011    PROT 5.9 07/23/2021    LABALBU 3.4 07/23/2021    CALCIUM 8.9 07/24/2021    BILITOT 0.5 07/23/2021    ALKPHOS 102 07/23/2021    AST 17 07/23/2021    ALT 20 07/23/2021       ASSESSMENT/PLAN:  1. Encephalopathy possibly related to psychiatric versus neurologic versus medical condition  2. Elevated troponin  3. Grade 1 anterior subluxation of L5 with respect to S1  4. Ambulatory dysfunction with multiple falls in last few months  5. Urinary frequency  6. Failure to thrive  7. Medical noncompliance  8. Non-insulin-dependent diabetes mellitus type 2-hemoglobin A1c 11.5  9. Hypertension      Patient presented to the ED from home due to altered mental status and altered behavior from baseline. Patient is a poor historian. States he has not been taking his medications for the last few months. However he did not know his prescribed medications either. At baseline he was able to take care of himself with help of friends. However this has changed over the last 1 to 2 months. He is alert oriented x3. We will try to get in contact with any acquaintances to inquire more about him medical/clinical history. We will obtain medical records from the [address] [facility]. Social work to be consulted as well. Neurology to be consulted for altered mental status as well.     Pending home medication from the [facility]  PT/OT  Activity level is pending evaluation with above  Glucoscans 4 times daily with sliding scale insulin  Blood pressure spine standing  Metformin 500 mg twice daily  Nesina 12.5 mg daily  Consult  for discharge [address] [facility], [place],[address], DO  9:59 AM  7/25/2021

## 2021-07-26 VITALS
WEIGHT: 162 LBS | TEMPERATURE: 98.4 F | DIASTOLIC BLOOD PRESSURE: 73 MMHG | BODY MASS INDEX: 26.03 KG/M2 | HEART RATE: 67 BPM | OXYGEN SATURATION: 97 % | HEIGHT: 66 IN | RESPIRATION RATE: 20 BRPM | SYSTOLIC BLOOD PRESSURE: 161 MMHG

## 2021-07-26 LAB
METER GLUCOSE: 165 MG/DL (ref 74–99)
METER GLUCOSE: 218 MG/DL (ref 74–99)
METER GLUCOSE: 286 MG/DL (ref 74–99)
SARS-COV-2, NAAT: NOT DETECTED

## 2021-07-26 PROCEDURE — 6370000000 HC RX 637 (ALT 250 FOR IP): Performed by: INTERNAL MEDICINE

## 2021-07-26 PROCEDURE — 92523 SPEECH SOUND LANG COMPREHEN: CPT | Performed by: SPEECH-LANGUAGE PATHOLOGIST

## 2021-07-26 PROCEDURE — 6370000000 HC RX 637 (ALT 250 FOR IP): Performed by: PSYCHIATRY & NEUROLOGY

## 2021-07-26 PROCEDURE — 82962 GLUCOSE BLOOD TEST: CPT

## 2021-07-26 PROCEDURE — 2580000003 HC RX 258: Performed by: INTERNAL MEDICINE

## 2021-07-26 PROCEDURE — 97116 GAIT TRAINING THERAPY: CPT

## 2021-07-26 PROCEDURE — 6360000002 HC RX W HCPCS: Performed by: INTERNAL MEDICINE

## 2021-07-26 PROCEDURE — 87635 SARS-COV-2 COVID-19 AMP PRB: CPT

## 2021-07-26 PROCEDURE — 97530 THERAPEUTIC ACTIVITIES: CPT

## 2021-07-26 RX ORDER — ATORVASTATIN CALCIUM 20 MG/1
20 TABLET, FILM COATED ORAL NIGHTLY
Qty: 30 TABLET | Refills: 3 | Status: ON HOLD | DISCHARGE
Start: 2021-07-26 | End: 2022-03-07 | Stop reason: HOSPADM

## 2021-07-26 RX ORDER — LISINOPRIL 10 MG/1
10 TABLET ORAL DAILY
Qty: 30 TABLET | Refills: 3 | DISCHARGE
Start: 2021-07-27

## 2021-07-26 RX ORDER — ASPIRIN 81 MG/1
81 TABLET ORAL DAILY
Qty: 30 TABLET | Refills: 3 | COMMUNITY
Start: 2021-07-27

## 2021-07-26 RX ORDER — ALOGLIPTIN 12.5 MG/1
12.5 TABLET, FILM COATED ORAL DAILY
Qty: 60 TABLET | DISCHARGE
Start: 2021-07-27

## 2021-07-26 RX ORDER — THIAMINE MONONITRATE (VIT B1) 100 MG
100 TABLET ORAL DAILY
Refills: 0 | DISCHARGE
Start: 2021-07-27

## 2021-07-26 RX ORDER — ATORVASTATIN CALCIUM 20 MG/1
20 TABLET, FILM COATED ORAL NIGHTLY
Status: DISCONTINUED | OUTPATIENT
Start: 2021-07-26 | End: 2021-07-26 | Stop reason: HOSPADM

## 2021-07-26 RX ADMIN — Medication 10 ML: at 10:07

## 2021-07-26 RX ADMIN — ALOGLIPTIN 12.5 MG: 12.5 TABLET, FILM COATED ORAL at 10:00

## 2021-07-26 RX ADMIN — INSULIN LISPRO 1 UNITS: 100 INJECTION, SOLUTION INTRAVENOUS; SUBCUTANEOUS at 10:03

## 2021-07-26 RX ADMIN — ENOXAPARIN SODIUM 40 MG: 40 INJECTION SUBCUTANEOUS at 09:58

## 2021-07-26 RX ADMIN — INSULIN LISPRO 2 UNITS: 100 INJECTION, SOLUTION INTRAVENOUS; SUBCUTANEOUS at 17:11

## 2021-07-26 RX ADMIN — LISINOPRIL 10 MG: 10 TABLET ORAL at 10:01

## 2021-07-26 RX ADMIN — Medication 100 MG: at 10:01

## 2021-07-26 RX ADMIN — INSULIN LISPRO 3 UNITS: 100 INJECTION, SOLUTION INTRAVENOUS; SUBCUTANEOUS at 12:34

## 2021-07-26 RX ADMIN — ASPIRIN 81 MG: 81 TABLET, FILM COATED ORAL at 09:59

## 2021-07-26 RX ADMIN — METFORMIN HYDROCHLORIDE 500 MG: 500 TABLET ORAL at 17:11

## 2021-07-26 RX ADMIN — METFORMIN HYDROCHLORIDE 500 MG: 500 TABLET ORAL at 09:59

## 2021-07-26 ASSESSMENT — PAIN SCALES - GENERAL
PAINLEVEL_OUTOF10: 0

## 2021-07-26 NOTE — CARE COORDINATION
SS NOTE:NO COVID TESTING DONE, PT WILL NEED A RAPID NEGATIVE COVID TEST FOR TRANSFER TO SNF. SW unable to reach pt's son Ginna Hopson after attempts to call and text him. SW made contact with pt and his  arrived- lifelong friends- Leonor Musa, [phone]. Per [place] pt and his son are estranged and have no contact with each other. SW shared the reason for and list of SNF facilities. They decided upon [facility]. [facility] will  accept pt for admission. For the SNF pt will need NO PRECERT, a signed PAYAM, current PT/OT notes and SW completed the HENs. SITA Martino.7/26/2021.2:21PM.

## 2021-07-26 NOTE — PROGRESS NOTES
Physical Therapy Treatment Note/Plan of Care    Room #:  0336/0336-01  Patient Name: King Ramón  YOB: 1945  MRN: [id number]    Date of Service: 7/26/2021     Tentative placement recommendation: Subacute vs Home Health Physical Therapy if patient meets goals  Equipment recommendation: To be determined and Augie Gonzalez      Evaluating Physical Therapist: Christina Goff, [address] [facility] #[id number]     Specific Provider Orders/Date/Referring Provider :   07/22/21 1930   PT eval and treat Start: 07/22/21 1930, End: 07/22/21 1930, ONE TIME, Standing Count: 1 Occurrences, R    Shy Anton DO    Admitting Diagnosis:   Altered mental status [R41.82]     Visit Diagnoses       Codes    Confusion    -  Primary R41.0    Elevated troponin     R77.8    Cerebrovascular accident (CVA), unspecified mechanism ([facility] [address]     I63.9        Surgery: noen    Patient Active Problem List   Diagnosis    Altered mental status       ASSESSMENT of Current Deficits Patient exhibits decreased strength, balance and endurance impairing gait distance and tolerance to activity. Patient benefits from amb with a wheeled walker as it inc patient's stability and safety. Cues throughout for wheeled walker approximation. Patient requires continued skilled physical therapy to address concerns listed above for increased safety and function at discharge.           PHYSICAL THERAPY  PLAN OF CARE       Physical therapy plan of care is established based on physician order,  patient diagnosis and clinical assessment    Current Treatment Recommendations:    -Bed Mobility: Lower extremity exercises   -Sitting Balance: Incorporate reaching activities to activate trunk muscles   -Standing Balance: Perform strengthening exercises in standing to promote motor control with or without upper extremity support   -Transfers: Provide instruction on proper hand and foot position for adequate transfer of weight onto lower extremities and use of gait device  -Gait: Gait training and Standing activities to improve: base of support, weight shift, weight bearing      PT long term treatment goals are located in below grid    Patient and or family understand(s) diagnosis, prognosis, and plan of care. Frequency of treatments: Patient will be seen  daily. Prior Level of Function: Patient ambulated with cane   Rehab Potential: good  for baseline    Past medical history:   History reviewed. No pertinent past medical history. History reviewed. No pertinent surgical history. SUBJECTIVE:    Precautions: Up with assistance, falls , diabetic, poor historian   Social history: Patient lives alone  in a apartment . (8th floor with elevator) with No steps  to enter  [employer] in shower . Equipment owned: Cane and Shower chair,      [address]   How much difficulty turning over in bed?: A Little  How much difficulty sitting down on / standing up from a chair with arms?: A Little  How much difficulty moving from lying on back to sitting on side of bed?: A Little  How much help from another person moving to and from a bed to a chair?: A Little  How much help from another person needed to walk in hospital room?: A Little  How much help from another person for climbing 3-5 steps with a railing?: A Lot  AM-PAC Inpatient Mobility Raw Score : 17  AM-PAC Inpatient T-Scale Score : 42.13  Mobility Inpatient CMS 0-100% Score: 50.57  Mobility Inpatient CMS G-Code Modifier : CK    Nursing cleared patient for PT treatment. OBJECTIVE;   Initial Evaluation  Date: 7/23/2021 Treatment Date:    7/26/2021   Short Term/ Long Term   Goals   Was pt agreeable to Eval/treatment?  Yes  yes To be met in 4 days   Pain level   0/10   0/10    Bed Mobility    Rolling: Supervision    Supine to sit: Minimal assist of 1   Sit to supine: Minimal assist of 1   Scooting: Minimal assist of 1  Rolling: Supervision    Supine to sit: Supervision    Sit to supine: Supervision Scooting: Supervision     Rolling: Independent   Supine to sit: Independent   Sit to supine: Independent   Scooting: Independent    Transfers Sit to stand: Minimal assist of 1  Sit to stand: Supervision  cues for hand placement     Sit to stand: Independent    Ambulation    50 feet using  hand held assist with Minimal assist of 1   for balance and upright shuffling gait, flexed posture   50 feet using  wheeled walker with Minimal assist of 1   cues for upright posture, walker approximation and safety   70 feet, 130 feet using  wheeled walker with Minimal assist of 1   cues for upright posture, walker approximation, safety, pacing and obstacle negotiation      100 feet using  least restrictive device with Independent    Stair negotiation: ascended and descended   Not assessed       ROM Within functional limits      Strength BUE:  4+/5  RLE:  4/5  LLE:  4/5  Increase strength in affected mm groups by 1/3 grade   Balance Sitting EOB:  fair +  Dynamic Standing:  fair wheeled walker   Sitting EOB: fair +  Dynamic Standing: fair with wheeled walker   Sitting EOB:  good   Dynamic Standing: good      Patient is Alert & Oriented x person, time and situation and follows one step directions   Sensation:  Patient  denies numbness/tingling     Edema:  no   Endurance: fair  , lower extremity fatigue limiting gait distance     Vitals: room air   Blood Pressure at rest  Blood Pressure during session    Heart Rate at rest  Heart Rate during session    SPO2 at rest 96%  SPO2 during session %     Patient education  Patient educated on role of Physical Therapy, risks of immobility, safety and plan of care,  importance of mobility while in hospital , purse lip breathing, ankle pumps, quad set and glut set for edema control, blood clot prevention, importance and purpose of adaptive device and adjusted to proper height for the patient.  and safety      Patient response to education:   Pt verbalized understanding Pt demonstrated skill Pt requires further education in this area   Yes Partial Yes      Treatment:  Patient practiced and was instructed/facilitated in the following treatment: Patient assisted to EOB. Sat edge of bed 10 minutes with Supervision  to increase dynamic sitting balance and activity tolerance. Patient performed seated exercises. Patient stood and amb to bathroom, standing dynamic challenges at sink. Patient amb into hallway, taking standing rests as needed and returned to bed in supine position. Therapeutic Exercises:  ankle pumps, long arc quad and seated marching  x 15 reps. At end of session, patient in bed with alarm call light and phone within reach,  all lines and tubes intact, nursing notified. Patient would benefit from continued skilled Physical Therapy to improve functional independence and quality of life.          Patient's/ family goals   get stronger      Time in  1117  Time out  1140    Total Treatment Time  23 minutes     CPT codes:  Therapeutic activities (82923)   13 minutes  1 unit(s)  Gait Training (36981) 10 minutes 1 unit(s)    Luis Pulse, PTA #[id number]

## 2021-07-26 NOTE — PROGRESS NOTES
SPEECH/LANGUAGE PATHOLOGY  SPEECH/LANGUAGE/COGNITIVE EVALUATION   and PLAN OF CARE      PATIENT NAME:  Dagmar Loera  (male)     MRN:  [id number]    :  1945  (78 y.o.)  STATUS:  Inpatient: Room 0336/0336-01    TODAY'S DATE:  2021  REFERRING PROVIDER:     SLP eval and treat Start: 21 1045, End: 21 1045, ONE TIME, Standing Count: 1 Occurrences, R    Blake A Gwen, DO  REASON FOR REFERRAL:  Altered mental status cognitive deficit noted during bedside swallow eval   EVALUATING THERAPIST: MADDY Butcher    ADMITTING DIAGNOSIS: Altered mental status [R41.82]    VISIT DIAGNOSIS:   Visit Diagnoses       Codes    Confusion    -  Primary R41.0    Elevated troponin     R77.8    Cerebrovascular accident (CVA), unspecified mechanism ([facility] [address]     I63.9           SPEECH THERAPY  PLAN OF CARE   The speech therapy  POC is established based on physician order, speech pathology diagnosis and results of clinical assessment     SPEECH PATHOLOGY DIAGNOSIS:    Moderate cognitive linguistic deficit     Speech Pathology intervention is recommended 3-6 times per week for LOS or when goals are met with emphasis on the following:      Conditions Requiring Skilled Therapeutic Intervention for speech, language and/or cognition    Cognitive linguistic impairment  Decreased safety awareness  Decreased short term memory  Decreased problem solving skills   Decreased thought organization    Specific Speech Therapy Interventions to Include: Therapeutic tasks for Cognition    Specific instructions for next treatment: To initiate POC    SHORT/LONG TERM GOALS    Improve awareness of surroundings to include consistent orientation to person, place and time. Increase patient's ability to sustain attention to treatment task for duration of activity despite introduction of extraneous enviromental stimuli.     Increase STM recall of previously reviewed procedure for safe execution of transfers and ambulation within a dynamic environment. Improve functional problem solving/reasoning for successful/safe completion of familiar ADL tasks. Patient goals: Patient/family involved in developing goals and treatment plan:   Treatment goals discussed with Patient    The Patient did not demonstrate complete understanding of the diagnosis, prognosis and plan of care   The patient/family to go back home ,     This plan may be re-evaluated and revised as warranted. Rehabilitation Potential/Prognosis: good                CLINICAL ASSESSMENT:  MOTOR SPEECH       Oral Peripheral Examination   Adequate lingual/labial strength     Parameters of Speech Production  Respiration:  Adequate for speech production  Articulation:  Within functional limits  Resonance:  Within functional limits  Quality:   Within functional limits  Pitch: Within functional limits  Intensity: Within functional limits  Fluency:  Intact  Prosody Intact    RECEPTIVE LANGUAGE    Comprehension of Yes/No Questions: Within functional limits--for concrete questions    Process  Simple Verbal Commands:   Within functional limits  Process Intermediate Verbal Commands:   Impaired  Process Complex Verbal Commands:     Impaired    Comprehension of Conversation:      Within functional limits--for concrete topics       EXPRESSIVE LANGUAGE     Serials: Functional    Imitation:  Words   Functional   Sentences Functional    Naming:  (Modality used:  Verbal)  Confrontation Naming  Functional  Functional Description  Functional  Response Naming: Impaired    Conversation:      Confusion was noted during conversation    COGNITION     SLUMS ASSESSMENT:    Assesses cognitive ability in areas of orientation, immediate/ STM recall, divergent naming, functional calculation, abstraction, mental manipulation, clock draw, and story comprehension.  Pt received a score of 12/30, which nper scoring criteria, could be clinically correlated with Dementia diagnosis    Attention/Orientation  Attention: Easily Distracted  Orientation:  Oriented to Person    Memory   Immediate Recall: Repeated 5/5    Delayed Recall:   Recalled 0/5    Long Term Recall:   Recalled Birthdate and Age    Organization/Problem Solving/Reasoning   Verbal Sequencing:   Impaired        Verbal Problem solving:   Impaired          CLINICAL OBSERVATIONS NOTED DURING THE EVALUATION  Latent responses and Cueing was required                  EDUCATION:   The Speech Language Pathologist (SLP) completed education regarding results of evaluation and that intervention is warranted at this time. Learner: Patient  Education: Reviewed results and recommendations of this evaluation  Evaluation of Education:  Needs further instruction    Evaluation Time includes thorough review of current medical information, gathering information on past medical history/social history and prior level of function, completion of standardized testing/informal observation of tasks, assessment of data and education on plan of care and goals. CPT code:    13190  eval speech sound lang comprehension      The admitting diagnosis and active problem list, as listed below have been reviewed prior to initiation of this evaluation.         ACTIVE PROBLEM LIST:   Patient Active Problem List   Diagnosis    Altered mental status       Shruthi Guardado MSCCC/SLP  Speech Language Pathologist  [id number]

## 2021-07-26 NOTE — DISCHARGE SUMMARY
Department of Internal Medicine  DS    [facility]  Admitting Physician: Dr. Melchor Yuan  Consultants: Dr. Pk Pastor  Date of Service: 7/22/2021    CHIEF COMPLAINT: Altered mental status, ambulatory dysfunction, failure to thrive    HISTORY OF PRESENT ILLNESS:    Patient is 72-year-old male who presented to the ED due to altered mental status and ambulatory dysfunction. Patient states that he has been having issues with ambulation. He states that he has fallen multiple times in the last few months. He denies any loss of consciousness. States that he stumbles and trips which lead to his falls. As such she is less active. Additionally patient on exam is having trouble remembering what has occurred in the past.  He also has trouble speaking. Sentences do not come out smoothly. Although he does not slur his speech. According to social work patient was taking care of himself with help from a few friends. He has been in his own bills. However most recently when his  visited him patient was found to be living in more deplorable condition. He had not been taking care of his paperwork/bills. Additionally patient was confused. It appears that patient on my exam has improved since he has been admitted. Patient does admit to urinary frequency. Patient states he does not take his medications or he has not taken his medications in the last 2 months.    7/23/2021  Patient seen and examined on medical surgical floor. Patient is alert oriented to person place. Patient is very poor historian. Patient has a very hard time putting together accurate sentences. Patient denies though any chest or abdominal pain. He denies any unusual shortness of breath. BUN/creatinine 14/0.7. Blood sugars ranging in the mid 200s. Patient serial troponins have been trending down. Hemoglobin 14.6 today with WBC 6.7. Temperature 98.1 with heart rate of 73 with blood pressure 153/70. O2 sat 100% on room air.   Patient does not remember any of his home medications so we are getting in contact with the [address] [place] [address] for them to fax over his home medications. Pending MRI of the brain today. 7/24/2021  Patient seen and examined on medical surgical floor. Patient complaining of some low back pain which has been there since his fall. Patient denies any chest or abdominal pain. Patient is somewhat of a poor historian but seems to be accurate answering simple questions. MRI, MRA of the head and neck reviewed with no significant stenosis or acute infarction. 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 There is a small chronic lacunar infarct in the right centrum semiovale and right temporal periventricular white matter. ,BUN/creatinine 20/0.7. Blood sugars ranging 242-133 with patient's hemoglobin A1c of 11.5. Speech therapy note reviewed. Temperature 98.6 with heart rate 60 and blood pressure currently 151/67. O2 sat 96% on room air at rest.  Urine output is good. PT/OT notes reviewed. 7/25/2021  Patient seen examined on general medical floor. Patient can is very alert oriented and pleasant. Patient does still has trouble finishing a sentence without significant word searching. Patient denies any problem with chest pain, abdominal pain, nausea/vomiting or unusual shortness of breath. Patient still has some lower back pain from his fall. Blood sugars range 154-302. Temperature 98.2 with heart rate of 65 blood pressure ranges 140//69. Urine output ranges 350-800 cc a shift. We still have not received records from the [address] [place] [address] clinic. Pending repeat PT evaluation. 7/26/2021  Patient seen and examined on general medical floor. Patient again feels good. Patient denies any problem with any chest or abdominal pain. Patient feels his strength is slowly returning. Neurology note reviewed. Blood sugars ranging 165-300. Temperature 98.4 with heart rate of 67 blood pressure 161/73.   O2 sat 97% on room air at Organizations:     Attends Club or Organization Meetings:     Marital Status:    Intimate Partner Violence:     Fear of Current or Ex-Partner:     Emotionally Abused:     Physically Abused:     Sexually Abused:        ROS: Positive in bold  General:   Denies chills, fatigue, fever, malaise, night sweats or weight loss    Psychological:   Denies anxiety, disorientation or hallucinations    ENT:    Denies epistaxis, headaches, vertigo or visual changes    Cardiovascular:   Denies any chest pain, irregular heartbeats, or palpitations. No paroxysmal nocturnal dyspnea. Respiratory:   Denies shortness of breath, coughing, sputum production, hemoptysis, or wheezing. No orthopnea. Gastrointestinal:   Denies nausea, vomiting, diarrhea, or constipation. Denies any abdominal pain. Denies change in bowel habits or stools. Genito-Urinary:    Denies any urgency, frequency, hematuria. Voiding without difficulty. Musculoskeletal:   Denies joint pain, joint stiffness, joint swelling or muscle pain    Neurology:    Denies any headache or focal neurological deficits. No weakness or paresthesia. Derm:    Denies any rashes, ulcers, or excoriations. Denies bruising. Extremities:   Denies any lower extremity swelling or edema. PHYSICAL EXAM: Abnormal findings noted  VITALS:  Vitals:    07/26/21 0830   BP: (!) 161/73   Pulse: 67   Resp: 20   Temp: 98.4 °F (36.9 °C)   SpO2: 97%         CONSTITUTIONAL:    Awake, alert, cooperative, no apparent distress, and appears stated age    EYES:     EOMI, sclera clear, conjunctiva normal    ENT:    Normocephalic, atraumatic,  External ears without lesions. NECK:    Supple, symmetrical, trachea midline,  no JVD    HEMATOLOGIC/LYMPHATICS:    No cervical lymphadenopathy and no supraclavicular lymphadenopathy    LUNGS:    Symmetric.  No increased work of breathing, good air exchange, clear to auscultation bilaterally, no wheezes, rhonchi, or rales, CARDIOVASCULAR:    Normal apical impulse, regular rate and rhythm, normal S1 and S2, no S3 or S4, and no murmur noted    ABDOMEN:    soft, non-distended, non-tender    MUSCULOSKELETAL:    There is no redness, warmth, or swelling of the joints. NEUROLOGIC:    Awake, alert, oriented to name, place and time. SKIN:    No bruising or bleeding. No redness, warmth, or swelling    EXTREMITIES:    Peripheral pulses present. No edema, cyanosis, or swelling. LINES/CATHETERS     LABORATORY DATA:  CBC with Differential:    Lab Results   Component Value Date    WBC 6.7 07/23/2021    RBC 4.87 07/23/2021    HGB 14.6 07/23/2021    HCT 42.8 07/23/2021     07/23/2021    MCV 87.9 07/23/2021    MCH 30.0 07/23/2021    MCHC 34.1 07/23/2021    RDW 13.0 07/23/2021    SEGSPCT 66 04/28/2011    LYMPHOPCT 21.1 07/23/2021    MONOPCT 10.8 07/23/2021    BASOPCT 0.3 07/23/2021    MONOSABS 0.72 07/23/2021    LYMPHSABS 1.41 07/23/2021    EOSABS 0.08 07/23/2021    BASOSABS 0.02 07/23/2021     CMP:    Lab Results   Component Value Date     07/24/2021    K 3.6 07/24/2021    K 3.9 07/22/2021     07/24/2021    CO2 26 07/24/2021    BUN 20 07/24/2021    CREATININE 0.7 07/24/2021    GFRAA >60 07/24/2021    LABGLOM >60 07/24/2021    GLUCOSE 250 07/24/2021    GLUCOSE 142 04/29/2011    PROT 5.9 07/23/2021    LABALBU 3.4 07/23/2021    CALCIUM 8.9 07/24/2021    BILITOT 0.5 07/23/2021    ALKPHOS 102 07/23/2021    AST 17 07/23/2021    ALT 20 07/23/2021       ASSESSMENT/PLAN:  1. Altered mental status with acute metabolic encephalopathy probably related to severe hyperglycemia and dehydration  2. Elevated troponin  3. Grade 1 anterior subluxation of L5 with respect to S1  4. Ambulatory dysfunction with multiple falls in last few months  5. Urinary frequency  6. Failure to thrive  7. Medical noncompliance  8. Non-insulin-dependent diabetes mellitus type 2-hemoglobin A1c 11.5  9.  Hypertension      Plan:  Discharged extended-care facility today for temporary rehab    Metformin 1 g p.o. twice daily    Nesina 12.5 mg daily  Aspirin 81 mg daily  Lipitor 20 mg daily  Lisinopril 10 mg daily   Thiamine 100 mg daily    [facility] at [facility]  Hopefully patient can be set up with some consistent home health aides monitoring his blood sugars, blood pressure and taking his medication.         Cadence Ambriz DO  9:45 AM  7/26/2021

## 2021-07-26 NOTE — PLAN OF CARE
Problem: Falls - Risk of:  Goal: Will remain free from falls  Description: Will remain free from falls  Outcome: Met This Shift  Goal: Absence of physical injury  Description: Absence of physical injury  Outcome: Met This Shift     Problem: Pain:  Goal: Pain level will decrease  Description: Pain level will decrease  Outcome: Met This Shift  Goal: Control of acute pain  Description: Control of acute pain  Outcome: Met This Shift  Goal: Control of chronic pain  Description: Control of chronic pain  Outcome: Completed     Problem: Skin Integrity:  Goal: Will show no infection signs and symptoms  Description: Will show no infection signs and symptoms  Outcome: Met This Shift  Goal: Absence of new skin breakdown  Description: Absence of new skin breakdown  Outcome: Met This Shift

## 2021-07-26 NOTE — CARE COORDINATION
SS NOTE:NO COVID TESTING DONE, PT WILL NEED A RAPID NEGATIVE COVID TEST FOR TRANSFER TO SNF. Arrangements completed for [facility] via [employer]. SW unable to reach pt's son JOSE DANIEL CONTRERAS after attempts to call and text him. SW made contact with pt and his  /friendCharol Amble and made her aware of this [facility] plan. For the SNF pt will need NO PRECERT, a signed PAYAM and SW completed the HENs. SITA Olivarez.7/26/2021.3:24PM.

## 2021-07-26 NOTE — DISCHARGE INSTR - COC
Continuity of Care Form    Patient Name: Jessica Walls   :  1945  MRN:  [id number]    Admit date:  2021  Discharge date:  2021    Code Status Order: Full Code   Advance Directives:      Admitting Physician:  Ridge Juárez DO  PCP: No primary care provider on file. Discharging Nurse: [facility] Unit/Room#: 7492/5442-53  Discharging Unit Phone Number: [phone]    Emergency Contact:   Extended Emergency Contact Information  Primary Emergency Contact: Melony Cooks PT  Home Phone: [phone]  Mobile Phone: [phone]  Relation: Other  Preferred language: English   needed? No    Past Surgical History:  History reviewed. No pertinent surgical history. Immunization History: There is no immunization history on file for this patient. Active Problems:  Patient Active Problem List   Diagnosis Code    Altered mental status R41.82       Isolation/Infection:   Isolation            No Isolation          Patient Infection Status       None to display            Nurse Assessment:  Last Vital Signs: BP (!) 161/73   Pulse 67   Temp 98.4 °F (36.9 °C) (Oral)   Resp 20   Ht 5' 6\" (1.676 m)   Wt 162 lb (73.5 kg)   SpO2 97%   BMI 26.15 kg/m²     Last documented pain score (0-10 scale): Pain Level: 0  Last Weight:   Wt Readings from Last 1 Encounters:   21 162 lb (73.5 kg)     Mental Status:  disoriented and alert    IV Access:  - None    Nursing Mobility/ADLs:  Walking   Assisted  Transfer  Assisted  Bathing  Assisted  Dressing  Assisted  Toileting  Assisted  Feeding  Independent  Med Admin  Independent  Med Delivery   whole    Wound Care Documentation and Therapy:        Elimination:  Continence:   · Bowel:  Yes  · Bladder: Yes  Urinary Catheter: None   Colostomy/Ileostomy/Ileal Conduit: No       Date of Last BM: 2021    Intake/Output Summary (Last 24 hours) at 2021 1434  Last data filed at 2021 1322  Gross per 24 hour   Intake 1080 ml Output 250 ml   Net 830 ml     I/O last 3 completed shifts: In: 12 [P.O.:960]  Out: 650 [Urine:650]    Safety Concerns: At Risk for Falls    Impairments/Disabilities:      Vision    Nutrition Therapy:  Current Nutrition Therapy:   - Oral Diet:  Carb Control 5 carbs/meal (2000kcals/day)    Routes of Feeding: Oral  Liquids: Thin Liquids  Daily Fluid Restriction: no  Last Modified Barium Swallow with Video (Video Swallowing Test): not done    Treatments at the Time of Hospital Discharge:   Respiratory Treatments: ***  Oxygen Therapy:  is not on home oxygen therapy. Ventilator:    - No ventilator support    Rehab Therapies: Physical Therapy and Occupational Therapy  Weight Bearing Status/Restrictions: No weight bearing restirctions  Other Medical Equipment (for information only, NOT a DME order):  walker  Other Treatments: ***    Patient's personal belongings (please select all that are sent with patient):  Glasses    RN SIGNATURE:  Electronically signed by Jm Del Rio RN on 7/26/21 at 3:13 PM EDT    CASE MANAGEMENT/SOCIAL WORK SECTION    Inpatient Status Date: ***    Readmission Risk Assessment Score:  Readmission Risk              Risk of Unplanned Readmission:  9           Discharging to Facility/ Agency   · Name: [id number]  · Address:  · [phone]  · [phone]    Dialysis Facility (if applicable)   · Name:  · Address:  · Dialysis Schedule:  · Phone:  · Fax:    / signature: Electronically signed by Claudene Learned. Ayse Opal on 7/26/21 at 3:02 PM EDT    PHYSICIAN SECTION    Prognosis: Good    Condition at Discharge: Stable    Rehab Potential (if transferring to Rehab): Good    Recommended Labs or Other Treatments After Discharge: ***    Physician Certification: I certify the above information and transfer of Beatrice Kenyon  is necessary for the continuing treatment of the diagnosis listed and that he requires [facility] for less 30 days. Update Admission H&P: {CHP DME Changes in HandP:[id number]:::0}    PHYSICIAN SIGNATURE:  Electronically signed by China Interiano DO on 7/26/21 at 2:34 PM EDT

## 2022-02-28 ENCOUNTER — APPOINTMENT (OUTPATIENT)
Dept: CT IMAGING | Age: 77
DRG: 871 | End: 2022-02-28
Payer: MEDICARE

## 2022-02-28 ENCOUNTER — APPOINTMENT (OUTPATIENT)
Dept: GENERAL RADIOLOGY | Age: 77
DRG: 871 | End: 2022-02-28
Payer: MEDICARE

## 2022-02-28 ENCOUNTER — HOSPITAL ENCOUNTER (INPATIENT)
Age: 77
LOS: 10 days | Discharge: NURSING, REHAB OR LONG-TERM CARE | DRG: 871 | End: 2022-03-10
Attending: EMERGENCY MEDICINE | Admitting: INTERNAL MEDICINE
Payer: MEDICARE

## 2022-02-28 DIAGNOSIS — B88.8 INFESTATION BY BED BUG: ICD-10-CM

## 2022-02-28 DIAGNOSIS — E11.00 HYPEROSMOLAR HYPERGLYCEMIC STATE (HHS) (HCC): Primary | ICD-10-CM

## 2022-02-28 DIAGNOSIS — R41.82 ALTERED MENTAL STATUS, UNSPECIFIED ALTERED MENTAL STATUS TYPE: ICD-10-CM

## 2022-02-28 DIAGNOSIS — R56.9 SEIZURE (HCC): ICD-10-CM

## 2022-02-28 PROBLEM — R41.0 DELIRIUM: Status: ACTIVE | Noted: 2021-07-22

## 2022-02-28 LAB
ACETAMINOPHEN LEVEL: <5 MCG/ML (ref 10–30)
ALBUMIN SERPL-MCNC: 4.3 G/DL (ref 3.5–5.2)
ALP BLD-CCNC: 263 U/L (ref 40–129)
ALT SERPL-CCNC: 16 U/L (ref 0–40)
AMPHETAMINE SCREEN, URINE: NOT DETECTED
ANION GAP SERPL CALCULATED.3IONS-SCNC: 10 MMOL/L (ref 7–16)
AST SERPL-CCNC: 16 U/L (ref 0–39)
B.E.: -1.1 MMOL/L (ref -3–3)
BACTERIA: ABNORMAL /HPF
BARBITURATE SCREEN URINE: NOT DETECTED
BENZODIAZEPINE SCREEN, URINE: NOT DETECTED
BETA-HYDROXYBUTYRATE: 0.19 MMOL/L (ref 0.02–0.27)
BILIRUB SERPL-MCNC: 0.4 MG/DL (ref 0–1.2)
BILIRUBIN DIRECT: <0.2 MG/DL (ref 0–0.3)
BILIRUBIN URINE: NEGATIVE
BILIRUBIN, INDIRECT: ABNORMAL MG/DL (ref 0–1)
BLOOD, URINE: ABNORMAL
BUN BLDV-MCNC: 11 MG/DL (ref 6–23)
CALCIUM SERPL-MCNC: 9.5 MG/DL (ref 8.6–10.2)
CANNABINOID SCREEN URINE: NOT DETECTED
CHLORIDE BLD-SCNC: 95 MMOL/L (ref 98–107)
CLARITY: CLEAR
CO2: 27 MMOL/L (ref 22–29)
COCAINE METABOLITE SCREEN URINE: NOT DETECTED
COHB: 0.2 % (ref 0–1.5)
COLOR: ABNORMAL
CREAT SERPL-MCNC: 0.7 MG/DL (ref 0.7–1.2)
CRITICAL: ABNORMAL
DATE ANALYZED: ABNORMAL
DATE OF COLLECTION: ABNORMAL
EKG ATRIAL RATE: 0 BPM
EKG Q-T INTERVAL: 0 MS
EKG QRS DURATION: 0 MS
EKG QTC CALCULATION (BAZETT): 0 MS
EKG R AXIS: 0 DEGREES
EKG T AXIS: 0 DEGREES
EKG VENTRICULAR RATE: 0 BPM
ETHANOL: <10 MG/DL (ref 0–0.08)
FENTANYL SCREEN, URINE: NOT DETECTED
GFR AFRICAN AMERICAN: >60
GFR NON-AFRICAN AMERICAN: >60 ML/MIN/1.73
GLUCOSE BLD-MCNC: 334 MG/DL (ref 74–99)
GLUCOSE BLD-MCNC: 573 MG/DL (ref 74–99)
GLUCOSE URINE: >=1000 MG/DL
HBA1C MFR BLD: 12.3 % (ref 4–5.6)
HCO3: 23.8 MMOL/L (ref 22–26)
HCT VFR BLD CALC: 41.3 % (ref 37–54)
HEMOGLOBIN: 13.6 G/DL (ref 12.5–16.5)
HHB: 2.3 % (ref 0–5)
KETONES, URINE: NEGATIVE MG/DL
LAB: ABNORMAL
LACTIC ACID: 2.1 MMOL/L (ref 0.5–2.2)
LEUKOCYTE ESTERASE, URINE: NEGATIVE
Lab: ABNORMAL
Lab: NORMAL
MCH RBC QN AUTO: 30.2 PG (ref 26–35)
MCHC RBC AUTO-ENTMCNC: 32.9 % (ref 32–34.5)
MCV RBC AUTO: 91.8 FL (ref 80–99.9)
METER GLUCOSE: 280 MG/DL (ref 74–99)
METER GLUCOSE: 303 MG/DL (ref 74–99)
METER GLUCOSE: 328 MG/DL (ref 74–99)
METER GLUCOSE: 355 MG/DL (ref 74–99)
METER GLUCOSE: 426 MG/DL (ref 74–99)
METER GLUCOSE: >500 MG/DL (ref 74–99)
METHADONE SCREEN, URINE: NOT DETECTED
METHB: 0.3 % (ref 0–1.5)
MODE: ABNORMAL
NITRITE, URINE: NEGATIVE
O2 CONTENT: 18.6 ML/DL
O2 SATURATION: 97.7 % (ref 92–98.5)
O2HB: 97.2 % (ref 94–97)
OPERATOR ID: 274
OPIATE SCREEN URINE: NOT DETECTED
OSMOLALITY: 293 MOSM/KG (ref 285–310)
OXYCODONE URINE: NOT DETECTED
PATIENT TEMP: 37 C
PCO2: 40.5 MMHG (ref 35–45)
PDW BLD-RTO: 13.4 FL (ref 11.5–15)
PH BLOOD GAS: 7.39 (ref 7.35–7.45)
PH UA: 7 (ref 5–9)
PH VENOUS: 7.36 (ref 7.35–7.45)
PHENCYCLIDINE SCREEN URINE: NOT DETECTED
PLATELET # BLD: 301 E9/L (ref 130–450)
PMV BLD AUTO: 8.7 FL (ref 7–12)
PO2: 116.9 MMHG (ref 75–100)
POTASSIUM SERPL-SCNC: 4 MMOL/L (ref 3.5–5)
PROTEIN UA: 30 MG/DL
RBC # BLD: 4.5 E12/L (ref 3.8–5.8)
RBC UA: ABNORMAL /HPF (ref 0–2)
SALICYLATE, SERUM: <0.3 MG/DL (ref 0–30)
SODIUM BLD-SCNC: 132 MMOL/L (ref 132–146)
SOURCE, BLOOD GAS: ABNORMAL
SPECIFIC GRAVITY UA: 1.01 (ref 1–1.03)
THB: 13.5 G/DL (ref 11.5–16.5)
TIME ANALYZED: 1213
TOTAL CK: 146 U/L (ref 20–200)
TOTAL PROTEIN: 7.2 G/DL (ref 6.4–8.3)
TRICYCLIC ANTIDEPRESSANTS SCREEN SERUM: NEGATIVE NG/ML
TROPONIN, HIGH SENSITIVITY: 51 NG/L (ref 0–11)
TSH SERPL DL<=0.05 MIU/L-ACNC: 2.13 UIU/ML (ref 0.27–4.2)
UROBILINOGEN, URINE: 0.2 E.U./DL
WBC # BLD: 7.1 E9/L (ref 4.5–11.5)
WBC UA: ABNORMAL /HPF (ref 0–5)

## 2022-02-28 PROCEDURE — 80179 DRUG ASSAY SALICYLATE: CPT

## 2022-02-28 PROCEDURE — 70450 CT HEAD/BRAIN W/O DYE: CPT

## 2022-02-28 PROCEDURE — 82010 KETONE BODYS QUAN: CPT

## 2022-02-28 PROCEDURE — 83930 ASSAY OF BLOOD OSMOLALITY: CPT

## 2022-02-28 PROCEDURE — 80143 DRUG ASSAY ACETAMINOPHEN: CPT

## 2022-02-28 PROCEDURE — 2000000000 HC ICU R&B

## 2022-02-28 PROCEDURE — 82947 ASSAY GLUCOSE BLOOD QUANT: CPT

## 2022-02-28 PROCEDURE — 82805 BLOOD GASES W/O2 SATURATION: CPT

## 2022-02-28 PROCEDURE — 72125 CT NECK SPINE W/O DYE: CPT

## 2022-02-28 PROCEDURE — 6370000000 HC RX 637 (ALT 250 FOR IP): Performed by: NURSE PRACTITIONER

## 2022-02-28 PROCEDURE — 80048 BASIC METABOLIC PNL TOTAL CA: CPT

## 2022-02-28 PROCEDURE — 6360000002 HC RX W HCPCS

## 2022-02-28 PROCEDURE — 82800 BLOOD PH: CPT

## 2022-02-28 PROCEDURE — 96365 THER/PROPH/DIAG IV INF INIT: CPT

## 2022-02-28 PROCEDURE — 71045 X-RAY EXAM CHEST 1 VIEW: CPT

## 2022-02-28 PROCEDURE — 93005 ELECTROCARDIOGRAM TRACING: CPT | Performed by: EMERGENCY MEDICINE

## 2022-02-28 PROCEDURE — 81001 URINALYSIS AUTO W/SCOPE: CPT

## 2022-02-28 PROCEDURE — 82550 ASSAY OF CK (CPK): CPT

## 2022-02-28 PROCEDURE — 6360000002 HC RX W HCPCS: Performed by: INTERNAL MEDICINE

## 2022-02-28 PROCEDURE — 36415 COLL VENOUS BLD VENIPUNCTURE: CPT

## 2022-02-28 PROCEDURE — 82077 ASSAY SPEC XCP UR&BREATH IA: CPT

## 2022-02-28 PROCEDURE — 80076 HEPATIC FUNCTION PANEL: CPT

## 2022-02-28 PROCEDURE — 83605 ASSAY OF LACTIC ACID: CPT

## 2022-02-28 PROCEDURE — 83036 HEMOGLOBIN GLYCOSYLATED A1C: CPT

## 2022-02-28 PROCEDURE — 2580000003 HC RX 258: Performed by: NURSE PRACTITIONER

## 2022-02-28 PROCEDURE — 6360000002 HC RX W HCPCS: Performed by: EMERGENCY MEDICINE

## 2022-02-28 PROCEDURE — 85027 COMPLETE CBC AUTOMATED: CPT

## 2022-02-28 PROCEDURE — 2700000000 HC OXYGEN THERAPY PER DAY

## 2022-02-28 PROCEDURE — 6370000000 HC RX 637 (ALT 250 FOR IP): Performed by: INTERNAL MEDICINE

## 2022-02-28 PROCEDURE — 96375 TX/PRO/DX INJ NEW DRUG ADDON: CPT

## 2022-02-28 PROCEDURE — 84443 ASSAY THYROID STIM HORMONE: CPT

## 2022-02-28 PROCEDURE — 6370000000 HC RX 637 (ALT 250 FOR IP): Performed by: EMERGENCY MEDICINE

## 2022-02-28 PROCEDURE — 99285 EMERGENCY DEPT VISIT HI MDM: CPT

## 2022-02-28 PROCEDURE — 82962 GLUCOSE BLOOD TEST: CPT

## 2022-02-28 PROCEDURE — 80307 DRUG TEST PRSMV CHEM ANLYZR: CPT

## 2022-02-28 PROCEDURE — 84484 ASSAY OF TROPONIN QUANT: CPT

## 2022-02-28 RX ORDER — NICOTINE POLACRILEX 4 MG
15 LOZENGE BUCCAL PRN
Status: DISCONTINUED | OUTPATIENT
Start: 2022-02-28 | End: 2022-02-28 | Stop reason: SDUPTHER

## 2022-02-28 RX ORDER — DEXTROSE MONOHYDRATE 25 G/50ML
12.5 INJECTION, SOLUTION INTRAVENOUS PRN
Status: DISCONTINUED | OUTPATIENT
Start: 2022-02-28 | End: 2022-02-28 | Stop reason: CLARIF

## 2022-02-28 RX ORDER — LEVETIRACETAM 10 MG/ML
500 INJECTION INTRAVASCULAR 2 TIMES DAILY
Status: DISCONTINUED | OUTPATIENT
Start: 2022-02-28 | End: 2022-02-28 | Stop reason: CLARIF

## 2022-02-28 RX ORDER — DEXTROSE MONOHYDRATE 50 MG/ML
100 INJECTION, SOLUTION INTRAVENOUS PRN
Status: DISCONTINUED | OUTPATIENT
Start: 2022-02-28 | End: 2022-03-10 | Stop reason: HOSPADM

## 2022-02-28 RX ORDER — GAUZE BANDAGE 2" X 2"
100 BANDAGE TOPICAL DAILY
Status: DISCONTINUED | OUTPATIENT
Start: 2022-02-28 | End: 2022-03-01 | Stop reason: DRUGHIGH

## 2022-02-28 RX ORDER — POTASSIUM CHLORIDE 7.45 MG/ML
10 INJECTION INTRAVENOUS PRN
Status: DISCONTINUED | OUTPATIENT
Start: 2022-02-28 | End: 2022-03-01

## 2022-02-28 RX ORDER — SODIUM CHLORIDE AND POTASSIUM CHLORIDE .9; .15 G/100ML; G/100ML
SOLUTION INTRAVENOUS CONTINUOUS
Status: DISCONTINUED | OUTPATIENT
Start: 2022-02-28 | End: 2022-03-08

## 2022-02-28 RX ORDER — ALOGLIPTIN 12.5 MG/1
12.5 TABLET, FILM COATED ORAL DAILY
Status: DISCONTINUED | OUTPATIENT
Start: 2022-02-28 | End: 2022-03-10 | Stop reason: HOSPADM

## 2022-02-28 RX ORDER — LORAZEPAM 2 MG/ML
0.5 INJECTION INTRAMUSCULAR ONCE
Status: COMPLETED | OUTPATIENT
Start: 2022-02-28 | End: 2022-02-28

## 2022-02-28 RX ORDER — LEVETIRACETAM 5 MG/ML
500 INJECTION INTRAVASCULAR 2 TIMES DAILY
Status: DISCONTINUED | OUTPATIENT
Start: 2022-02-28 | End: 2022-03-03

## 2022-02-28 RX ORDER — ACETAMINOPHEN 500 MG
500 TABLET ORAL EVERY 6 HOURS PRN
Status: DISCONTINUED | OUTPATIENT
Start: 2022-02-28 | End: 2022-03-10 | Stop reason: HOSPADM

## 2022-02-28 RX ORDER — ACETAMINOPHEN 650 MG/1
650 SUPPOSITORY RECTAL EVERY 4 HOURS PRN
Status: DISCONTINUED | OUTPATIENT
Start: 2022-02-28 | End: 2022-03-10 | Stop reason: HOSPADM

## 2022-02-28 RX ORDER — DEXTROSE AND SODIUM CHLORIDE 5; .45 G/100ML; G/100ML
INJECTION, SOLUTION INTRAVENOUS CONTINUOUS PRN
Status: DISCONTINUED | OUTPATIENT
Start: 2022-02-28 | End: 2022-03-10 | Stop reason: HOSPADM

## 2022-02-28 RX ORDER — SODIUM CHLORIDE 9 MG/ML
INJECTION, SOLUTION INTRAVENOUS CONTINUOUS
Status: DISCONTINUED | OUTPATIENT
Start: 2022-02-28 | End: 2022-02-28

## 2022-02-28 RX ORDER — LORAZEPAM 2 MG/ML
INJECTION INTRAMUSCULAR
Status: COMPLETED
Start: 2022-02-28 | End: 2022-02-28

## 2022-02-28 RX ORDER — ASPIRIN 81 MG/1
81 TABLET ORAL DAILY
Status: DISCONTINUED | OUTPATIENT
Start: 2022-02-28 | End: 2022-03-10 | Stop reason: HOSPADM

## 2022-02-28 RX ORDER — POLYETHYLENE GLYCOL 3350 17 G/17G
17 POWDER, FOR SOLUTION ORAL DAILY PRN
Status: DISCONTINUED | OUTPATIENT
Start: 2022-02-28 | End: 2022-03-10 | Stop reason: HOSPADM

## 2022-02-28 RX ORDER — LEVETIRACETAM 10 MG/ML
1000 INJECTION INTRAVASCULAR ONCE
Status: COMPLETED | OUTPATIENT
Start: 2022-02-28 | End: 2022-02-28

## 2022-02-28 RX ORDER — ATORVASTATIN CALCIUM 20 MG/1
20 TABLET, FILM COATED ORAL NIGHTLY
Status: DISCONTINUED | OUTPATIENT
Start: 2022-02-28 | End: 2022-03-02

## 2022-02-28 RX ORDER — MAGNESIUM SULFATE 1 G/100ML
1000 INJECTION INTRAVENOUS PRN
Status: DISCONTINUED | OUTPATIENT
Start: 2022-02-28 | End: 2022-03-01

## 2022-02-28 RX ORDER — PROCHLORPERAZINE EDISYLATE 5 MG/ML
10 INJECTION INTRAMUSCULAR; INTRAVENOUS EVERY 6 HOURS PRN
Status: DISCONTINUED | OUTPATIENT
Start: 2022-02-28 | End: 2022-03-01

## 2022-02-28 RX ORDER — LORAZEPAM 2 MG/ML
1 INJECTION INTRAMUSCULAR ONCE
Status: COMPLETED | OUTPATIENT
Start: 2022-02-28 | End: 2022-02-28

## 2022-02-28 RX ORDER — NICOTINE POLACRILEX 4 MG
15 LOZENGE BUCCAL PRN
Status: DISCONTINUED | OUTPATIENT
Start: 2022-02-28 | End: 2022-03-10 | Stop reason: HOSPADM

## 2022-02-28 RX ORDER — LORAZEPAM 2 MG/ML
1 INJECTION INTRAMUSCULAR EVERY 6 HOURS PRN
Status: DISCONTINUED | OUTPATIENT
Start: 2022-02-28 | End: 2022-03-01

## 2022-02-28 RX ADMIN — SODIUM CHLORIDE AND POTASSIUM CHLORIDE: 9; 1.49 INJECTION, SOLUTION INTRAVENOUS at 17:20

## 2022-02-28 RX ADMIN — INSULIN HUMAN 10 UNITS: 100 INJECTION, SOLUTION PARENTERAL at 11:49

## 2022-02-28 RX ADMIN — INSULIN HUMAN 8 UNITS: 100 INJECTION, SOLUTION PARENTERAL at 17:55

## 2022-02-28 RX ADMIN — SODIUM CHLORIDE AND POTASSIUM CHLORIDE: 9; 1.49 INJECTION, SOLUTION INTRAVENOUS at 23:07

## 2022-02-28 RX ADMIN — ENOXAPARIN SODIUM 40 MG: 100 INJECTION SUBCUTANEOUS at 17:16

## 2022-02-28 RX ADMIN — LEVETIRACETAM 500 MG: 5 INJECTION, SOLUTION INTRAVENOUS at 21:19

## 2022-02-28 RX ADMIN — LORAZEPAM 1 MG: 2 INJECTION INTRAMUSCULAR at 11:25

## 2022-02-28 RX ADMIN — SODIUM CHLORIDE 6.6 UNITS/HR: 9 INJECTION, SOLUTION INTRAVENOUS at 23:30

## 2022-02-28 RX ADMIN — LORAZEPAM 1 MG: 2 INJECTION INTRAMUSCULAR; INTRAVENOUS at 11:25

## 2022-02-28 RX ADMIN — LEVETIRACETAM 1000 MG: 10 INJECTION, SOLUTION INTRAVENOUS at 11:22

## 2022-02-28 RX ADMIN — LORAZEPAM 0.5 MG: 2 INJECTION INTRAMUSCULAR; INTRAVENOUS at 11:29

## 2022-02-28 NOTE — PROGRESS NOTES
Unable to complete database at this time- patient limited alertness to voice only. VM left for Landon Culp in attempt to complete.

## 2022-02-28 NOTE — ED NOTES
Pt present to ED with fall of unknown time, pt noted alert, not responding to questions, twitching and resistant. Made MD aware of activity, pt noted at approx. 10am seizure activity with MD at bedside, pt received Ativan 1mg per MD. Pt does not follow commands. Pt body noted throughout with bite marks, staff removed several bed bugs (lived) and dead bed bugs on pt and surrounding areas.      Xin Morales RN  02/28/22 1139       Xin Morales RN  02/28/22 1141  Pt sent to CT approx 1020am      Xin Morales RN  02/28/22 1144

## 2022-02-28 NOTE — CARE COORDINATION
SS Note: No Covid test. Pt presented for AMS. Pt urinated on self 3x in route w/EMS and has Bed bugs. While in room here Pt was noted to be having seizure-like activity w/repetitive movement on the right side. Pt is unable to follow any commands or respond. Pt placed on 02 as pulse oximeter reading 84% on RA. Pt was an admission in July 2021 for AMS, ambulatory dysfunction, failure to thrive and was d/c'd to  [facility]. This SW noted then that pt is resdient @ [facility], [place]- low income. SW spoke to [facility]- Mgr there [phone] who noted pt has lived there since 2008. Pt's son Brennon Virk appears to be living in [place] [phone] but is estranged from pt. CM placed call to [address] [place] [address] in July & noted pt is not service connected and does not have travel benefits. Pt does have apparent  & lifelong friends- Bernadette Guerrero [phone]. She did assist pt in July for choice of [facility]. Pt did come in today w/termination of tenancy letter from [address] [place] [address]) noting rent needs to be paid by 3/10/22- otherwise- terminated lease. 1200  Pt is not responding @ this time- unable to speak to pt. SW called [facility]- [phone] & left  for return call. SW called Adult Protective Services (APS) - left  for return call. [address] returned call from [facility]. Reviewed pt's case. She noted referral from July did not develop into anything. Pt refused services. It looks like he went to [place]- Dr. Mariah Morales in past but has not been there for some time. She noted to contact APS @ d/c so they can follow-up. SW called [facility] & left another  for return call. Pt admitted for AMS -probable metabolic encephalopathy as well as new onset tonic-clonic seizure. Neuro to consult.    Electronically signed by SITA Calvin on 2/28/2022 at 3:44 PM

## 2022-02-28 NOTE — H&P
Department of Internal Medicine        CHIEF COMPLAINT: Altered mental status, seizures    Reason for Admission: Altered mental status, seizure, hypoglycemia    HISTORY OF PRESENT ILLNESS:      The patient is a 78 y.o. male who presents with being brought in by paramedics. Apparently patient's neighbors called EMS when he heard the patient screaming and when they found him he was unresponsive. Paramedics came to check blood sugar which was reading high. There is no evidence of any trauma at the scene. Patient was incontinent of urine. The patient had tonic-clonic seizure in the ED and was treated with Ativan and Keppra. CT the head did not show any acute intracranial abnormality. Patient had a random blood sugar of 573 on admission. Liver enzymes essentially normal with a negative drug screen and a WBC 7.1 hemoglobin 13.6. Past Medical History:    No past medical history on file. Past Surgical History:    No past surgical history on file. Medications Prior to Admission:    @  Prior to Admission medications    Medication Sig Start Date End Date Taking?  Authorizing Provider   aspirin 81 MG EC tablet Take 1 tablet by mouth daily 7/27/21   Trav Beyer, DO   alogliptin (NESINA) 12.5 MG TABS tablet Take 1 tablet by mouth daily 7/27/21   Trav Beyer, DO   insulin lispro (HUMALOG) 100 UNIT/ML injection vial Inject 0-3 Units into the skin nightly 7/26/21   Trav Beyer, DO   insulin lispro (HUMALOG) 100 UNIT/ML injection vial Inject 0-6 Units into the skin 3 times daily (with meals) 7/26/21   Trav Beyer, DO   metFORMIN (GLUCOPHAGE) 1000 MG tablet Take 1 tablet by mouth 2 times daily (with meals) 7/26/21   Trav Beyer, DO   atorvastatin (LIPITOR) 20 MG tablet Take 1 tablet by mouth nightly 7/26/21   Trav Beyer, DO   lisinopril (PRINIVIL;ZESTRIL) 10 MG tablet Take 1 tablet by mouth daily 7/27/21   Trav Beyer, DO   thiamine mononitrate (THIAMINE) 100 MG tablet Take 1 tablet by mouth daily 7/27/21   Jacques Montano DO       Allergies:  Patient has no allergy information on record. Social History:   Social History     Socioeconomic History    Marital status:      Spouse name: Not on file    Number of children: Not on file    Years of education: Not on file    Highest education level: Not on file   Occupational History    Not on file   Tobacco Use    Smoking status: Never Smoker    Smokeless tobacco: Never Used   Substance and Sexual Activity    Alcohol use: Not on file    Drug use: Not on file    Sexual activity: Not on file   Other Topics Concern    Not on file   Social History Narrative    Not on file     Social Determinants of Health     Financial Resource Strain:     Difficulty of Paying Living Expenses: Not on file   Food Insecurity:     Worried About Running Out of Food in the Last Year: Not on file    Cora of Food in the Last Year: Not on file   Transportation Needs:     Lack of Transportation (Medical): Not on file    Lack of Transportation (Non-Medical):  Not on file   Physical Activity:     Days of Exercise per Week: Not on file    Minutes of Exercise per Session: Not on file   Stress:     Feeling of Stress : Not on file   Social Connections:     Frequency of Communication with Friends and Family: Not on file    Frequency of Social Gatherings with Friends and Family: Not on file    Attends Sabianism Services: Not on file    Active Member of [address] [place] [address] or Organizations: Not on file    Attends Club or Organization Meetings: Not on file    Marital Status: Not on file   Intimate Partner Violence:     Fear of Current or Ex-Partner: Not on file    Emotionally Abused: Not on file    Physically Abused: Not on file    Sexually Abused: Not on file   Housing Stability:     Unable to Pay for Housing in the Last Year: Not on file    Number of [place] in the Last Year: Not on file    Unstable Housing in the Last Year: Not on file       Family History:   No family history on file. REVIEW OF SYSTEMS: Unable to get information secondary to patient's current condition. Gen: Patient denies any lightheadedness or dizziness. No LOC or syncope. No fevers or chills. HEENT: No earache, sore throat or nasal congestion. Resp: Denies cough, hemoptysis or sputum production. Cardiac: Denies chest pain, SOB, diaphoresis or palpitations. GI: No nausea, vomiting, diarrhea or constipation. No melena or hematochezia. : No urinary complaints, dysuria, hematuria or frequency. MSK: No extremity weakness, paralysis or paresthesias. PHYSICAL EXAM:    Vitals:  /77   Pulse 98   Temp 98.3 °F (36.8 °C) (Oral)   Resp 18   SpO2 97%     General:  This is a 78 y.o. yo male who is unresponsive to verbal or painful stimuli at this time. HEENT:  Head is normocephalic and atraumatic, PERRLA, EOMI, mucus membranes dry with no pharyngeal erythema or exudate. Neck:  Supple with no carotid bruits, JVD or thyromegaly.   No cervical adenopathy  CV:  Regular rate and rhythm, 2/6 systolic murmurs  Lungs:  Clear to auscultation bilaterally with no wheezes, rales or rhonchi  Abdomen:  Soft, nontender, nondistended, bowel sounds present  Extremities:  No edema, peripheral pulses intact bilaterally  Neuro: Patient unresponsive to verbal or painful stimuli  Skin:  No rashes, lesions or wounds    DATA:  CBC with Differential:    Lab Results   Component Value Date    WBC 7.1 02/28/2022    RBC 4.50 02/28/2022    HGB 13.6 02/28/2022    HCT 41.3 02/28/2022     02/28/2022    MCV 91.8 02/28/2022    MCH 30.2 02/28/2022    MCHC 32.9 02/28/2022    RDW 13.4 02/28/2022    SEGSPCT 66 04/28/2011    LYMPHOPCT 21.1 07/23/2021    MONOPCT 10.8 07/23/2021    BASOPCT 0.3 07/23/2021    MONOSABS 0.72 07/23/2021    LYMPHSABS 1.41 07/23/2021    EOSABS 0.08 07/23/2021    BASOSABS 0.02 07/23/2021     CMP:    Lab Results   Component Value Date     02/28/2022    K 4.0 02/28/2022    K 3.9 07/22/2021    CL 95 02/28/2022    CO2 27 02/28/2022    BUN 11 02/28/2022    CREATININE 0.7 02/28/2022    GFRAA >60 02/28/2022    LABGLOM >60 02/28/2022    GLUCOSE 573 02/28/2022    GLUCOSE 142 04/29/2011    PROT 7.2 02/28/2022    LABALBU 4.3 02/28/2022    CALCIUM 9.5 02/28/2022    BILITOT 0.4 02/28/2022    ALKPHOS 263 02/28/2022    AST 16 02/28/2022    ALT 16 02/28/2022     Magnesium:    Lab Results   Component Value Date    MG 2.0 07/23/2021     Phosphorus:    Lab Results   Component Value Date    PHOS 3.2 07/23/2021     PT/INR:    Lab Results   Component Value Date    PROTIME 13.2 04/29/2011    INR 1.3 04/29/2011     Troponin:  No results found for: TROPONINI  U/A:    Lab Results   Component Value Date    COLORU Straw 02/28/2022    PROTEINU 30 02/28/2022    PHUR 7.0 02/28/2022    WBCUA 1-3 02/28/2022    RBCUA 0-1 02/28/2022    BACTERIA RARE 02/28/2022    CLARITYU Clear 02/28/2022    SPECGRAV 1.015 02/28/2022    LEUKOCYTESUR Negative 02/28/2022    UROBILINOGEN 0.2 02/28/2022    BILIRUBINUR Negative 02/28/2022    BLOODU TRACE 02/28/2022    GLUCOSEU >=1000 02/28/2022     ABG:    Lab Results   Component Value Date    PH 7.387 02/28/2022    PCO2 40.5 02/28/2022    PO2 116.9 02/28/2022    HCO3 23.8 02/28/2022    BE -1.1 02/28/2022    O2SAT 97.7 02/28/2022     HgBA1c:    Lab Results   Component Value Date    LABA1C 11.5 07/23/2021     FLP:    Lab Results   Component Value Date    TRIG 111 07/23/2021    HDL 48 07/23/2021    LDLCALC 111 07/23/2021    LABVLDL 22 07/23/2021     TSH:    Lab Results   Component Value Date    TSH 2.130 02/28/2022     IRON:  No results found for: IRON  LIPASE:  No results found for: LIPASE    ASSESSMENT AND PLAN:      Patient Active Problem List    Diagnosis Date Noted    Hyperosmolar hyperglycemic state (HHS) ([facility] [address] 02/28/2022    New onset seizure ([facility] [address] 02/28/2022    Delirium 07/22/2021     Impression:  1. Altered mental status -probable metabolic encephalopathy  2. Noninsulin dependent diabetes mellitus type 2hyperglycemic  3. New onset tonic-clonic seizure  4. Hypertension  5. History of medical noncompliance    Plan:  Home medications reviewed  IV fluids normal saline 20 KCl 100 cc an hour  Glucoscans 4 times daily with sliding scale insulin  Ativan 1 mg as needed for seizure  Consult neurology  Keppra 500 mg IV piggyback every 12 hours  Possible MRI of the brain if no improvement in mental status  Lovenox 40 mg subcu daily  Consult PT/OT  Hemoglobin A1c now    CMP, CBC, magnesium in a.m.       Ramana Wolff DO, D.O.  2/28/2022  1:18 PM

## 2022-02-28 NOTE — ED PROVIDER NOTES
Neighbors called EMS when they heard the patient screaming. When EMS arrived they state he was unresponsive. They checked his blood sugar which read as high. There was no evidence of any trauma at the scene. They state that he urinated on himself 3 times in route. They also noted bedbugs. Upon arrival when I enter the room patient is having seizure-like activity with repetitive movement on the right side. He is unable to follow any commands or respond. The history is provided by the EMS personnel. Altered Mental Status      Review of Systems   Unable to perform ROS: Mental status change       Physical Exam  Constitutional:       General: He is in acute distress. Appearance: He is normal weight. HENT:      Head: Normocephalic and atraumatic. Nose: Nose normal.      Mouth/Throat:      Mouth: Mucous membranes are moist.      Pharynx: Oropharynx is clear. Eyes:      Conjunctiva/sclera: Conjunctivae normal.      Pupils: Pupils are equal, round, and reactive to light. Cardiovascular:      Rate and Rhythm: Normal rate and regular rhythm. Pulses: Normal pulses. Pulmonary:      Effort: Pulmonary effort is normal. No respiratory distress. Breath sounds: Normal breath sounds. No wheezing or rales. Abdominal:      General: Abdomen is flat. Musculoskeletal:      Cervical back: Normal range of motion. No rigidity. Skin:     Capillary Refill: Capillary refill takes less than 2 seconds. Findings: Rash present. Neurological:      Comments: Seizure-like activity, unable to do a neuro exam at this time         Procedures    [facility]    ED Course as of 02/28/22 1308   Mon Feb 28, 2022   1105 Patient is still agitated in the bed however there is no seizure-like activity. He is unable to follow any commands at this time. A second half milligram dose of Ativan has been ordered.  [JS]      ED Course User Index  [JS] Sheila Potts DO    EKG Interpretation    Interpreted by emergency department physician    Unable to obtain due to patient movement. Connie Jc DO      Ativan 1 mg was provided seizure precautions were instituted. Patient was placed on oxygen as his pulse oximeter was reading 84% on room air. Seizure like activity did subside with Ativan. Patient will be sent over for CT to evaluate for brain bleed. ED Course as of 02/28/22 1310   Mon Feb 28, 2022   1105 Patient is still agitated in the bed however there is no seizure-like activity. He is unable to follow any commands at this time. A second half milligram dose of Ativan has been ordered. [JS]      ED Course User Index  [JS] Asia Mahan, DO       --------------------------------------------- PAST HISTORY ---------------------------------------------  Past Medical History:  has no past medical history on file. Past Surgical History:  has no past surgical history on file. Social History:  reports that he has never smoked. He has never used smokeless tobacco.    Family History: family history is not on file. The patients home medications have been reviewed.     Allergies: Patient has no allergy information on record.    -------------------------------------------------- RESULTS -------------------------------------------------    Lab  Results for orders placed or performed during the hospital encounter of 02/28/22   CK   Result Value Ref Range    Total  20 - 200 U/L   Basic metabolic panel   Result Value Ref Range    Sodium 132 132 - 146 mmol/L    Potassium 4.0 3.5 - 5.0 mmol/L    Chloride 95 (L) 98 - 107 mmol/L    CO2 27 22 - 29 mmol/L    Anion Gap 10 7 - 16 mmol/L    Glucose 573 (HH) 74 - 99 mg/dL    BUN 11 6 - 23 mg/dL    CREATININE 0.7 0.7 - 1.2 mg/dL    GFR Non-African American >60 >=60 mL/min/1.73    GFR African American >60     Calcium 9.5 8.6 - 10.2 mg/dL   CBC   Result Value Ref Range    WBC 7.1 4.5 - 11.5 E9/L    RBC 4.50 3.80 - 5.80 E12/L    Hemoglobin 13.6 12.5 - 16.5 g/dL    Hematocrit 41.3 37.0 - 54.0 %    MCV 91.8 80.0 - 99.9 fL    MCH 30.2 26.0 - 35.0 pg    MCHC 32.9 32.0 - 34.5 %    RDW 13.4 11.5 - 15.0 fL    Platelets 066 697 - 508 E9/L    MPV 8.7 7.0 - 12.0 fL   Troponin   Result Value Ref Range    Troponin, High Sensitivity 51 (H) 0 - 11 ng/L   Urinalysis with Microscopic   Result Value Ref Range    Color, UA Straw Straw/Yellow    Clarity, UA Clear Clear    Glucose, Ur >=1000 (A) Negative mg/dL    Bilirubin Urine Negative Negative    Ketones, Urine Negative Negative mg/dL    Specific Gravity, UA 1.015 1.005 - 1.030    Blood, Urine TRACE (A) Negative    pH, UA 7.0 5.0 - 9.0    Protein, UA 30 (A) Negative mg/dL    Urobilinogen, Urine 0.2 <2.0 E.U./dL    Nitrite, Urine Negative Negative    Leukocyte Esterase, Urine Negative Negative    WBC, UA 1-3 0 - 5 /HPF    RBC, UA 0-1 0 - 2 /HPF    Bacteria, UA RARE (A) None Seen /HPF   Lactic Acid   Result Value Ref Range    Lactic Acid 2.1 0.5 - 2.2 mmol/L   Beta-Hydroxybutyrate   Result Value Ref Range    Beta-Hydroxybutyrate 0.19 0.02 - 0.27 mmol/L   pH, venous   Result Value Ref Range    pH, [place] 7.36 7.35 - 7.45   TSH   Result Value Ref Range    TSH 2.130 0.270 - 4.200 uIU/mL   Hepatic Function Panel   Result Value Ref Range    Total Protein 7.2 6.4 - 8.3 g/dL    Albumin 4.3 3.5 - 5.2 g/dL    Alkaline Phosphatase 263 (H) 40 - 129 U/L    ALT 16 0 - 40 U/L    AST 16 0 - 39 U/L    Total Bilirubin 0.4 0.0 - 1.2 mg/dL    Bilirubin, Direct <0.2 0.0 - 0.3 mg/dL    Bilirubin, Indirect see below 0.0 - 1.0 mg/dL   URINE DRUG SCREEN   Result Value Ref Range    Amphetamine Screen, Urine NOT DETECTED Negative <1000 ng/mL    Barbiturate Screen, Ur NOT DETECTED Negative < 200 ng/mL    Benzodiazepine Screen, Urine NOT DETECTED Negative < 200 ng/mL    Cannabinoid Scrn, Ur NOT DETECTED Negative < 50ng/mL    Cocaine Metabolite Screen, Urine NOT DETECTED Negative < 300 ng/mL    Opiate Scrn, Ur NOT DETECTED Negative < 300ng/mL    PCP Screen, Urine NOT DETECTED Negative < 25 ng/mL    Methadone Screen, Urine NOT DETECTED Negative <300 ng/mL    Oxycodone Urine NOT DETECTED Negative <100 ng/mL    FENTANYL SCREEN, URINE NOT DETECTED Negative <1 ng/mL    Drug Screen Comment: see below    Serum Drug Screen   Result Value Ref Range    Ethanol Lvl <10 mg/dL    Acetaminophen Level <5.0 (L) 10.0 - 96.0 mcg/mL    Salicylate, Serum <2.2 0.0 - 30.0 mg/dL   Blood Gas, Arterial   Result Value Ref Range    Date Analyzed 20220228     Time Analyzed 1213     Source: Blood Arterial     pH, Blood Gas 7.387 7.350 - 7.450    PCO2 40.5 35.0 - 45.0 mmHg    PO2 116.9 (H) 75.0 - 100.0 mmHg    HCO3 23.8 22.0 - 26.0 mmol/L    B.E. -1.1 -3.0 - 3.0 mmol/L    O2 Sat 97.7 92.0 - 98.5 %    O2Hb 97.2 (H) 94.0 - 97.0 %    COHb 0.2 0.0 - 1.5 %    MetHb 0.3 0.0 - 1.5 %    O2 Content 18.6 mL/dL    HHb 2.3 0.0 - 5.0 %    tHb (est) 13.5 11.5 - 16.5 g/dL    Mode NC-4 L     Date Of Collection      Time Collected      Pt Temp 37.0 C          Lab [id number]     Critical(s) Notified . No Critical Values    POCT Glucose   Result Value Ref Range    Meter Glucose >500 (H) 74 - 99 mg/dL   POCT Glucose   Result Value Ref Range    Meter Glucose 303 (H) 74 - 99 mg/dL       Radiology  CT HEAD WO CONTRAST   Final Result   1. No acute intracranial abnormality. 2. Chronic small vessel ischemic disease. CT CERVICAL SPINE WO CONTRAST   Final Result   1. No acute fracture or subluxation. 2. Degenerative changes in the cervical spine with central canal stenosis at   C3-4 and multilevel neural foraminal narrowing. 3. There are nonspecific lucent lesions within the C2 body and left lateral   mass as well as a sclerotic lesion within the C3 spinous process. These may   represent benign findings. Consider MRI cervical spine or bone scan if the   patient has history of malignancy.          XR CHEST PORTABLE   Final Result   The cardiac silhouette and interstitial markings are somewhat prominent could be accentuated by more shallow inspiration lighter technique compared to   prior exam.  However, consider upright PA and lateral views when the patient   is able to ensure resolution.           ------------------------- NURSING NOTES AND VITALS REVIEWED ---------------------------  Date / Time Roomed:  2/28/2022  9:48 AM  ED Bed Assignment:  08/08    The nursing notes within the ED encounter and vital signs as below have been reviewed. Patient Vitals for the past 24 hrs:   BP Temp Temp src Pulse Resp SpO2   02/28/22 1246 107/77   98 18 97 %   02/28/22 1155 110/64   87 20 99 %   02/28/22 1108    101 16 99 %   02/28/22 1106 (!) 134/123   102 25 94 %   02/28/22 1012  98.3 °F (36.8 °C) Oral          Oxygen Saturation Interpretation: Normal      ------------------------------------------ PROGRESS NOTES ------------------------------------------  Re-evaluation(s):  Time: 13:00. Patients symptoms show no change  Repeat physical examination is not changed  Patient is less agitated. He is still unable to respond or follow commands. Airway patent and breathing appropriate        --------------------------------- ADDITIONAL PROVIDER NOTES ---------------------------------  Consultations:  Spoke with Dr. Mani Long,  They will admit this patient. This patient's ED course included: a personal history and physicial examination, multiple bedside re-evaluations, IV medications, cardiac monitoring and complex medical decision making and emergency management    This patient has been closely monitored during their ED course. Please note that the withdrawal or failure to initiate urgent interventions for this patient would likely result in a life threatening deterioration or permanent disability. Accordingly this patient received 30 minutes of critical care time, excluding separately billable procedures. Systems at risk for deterioration include: neurologic. Clinical Impression  1.  Hyperosmolar hyperglycemic state (HHS) ([facility] [address]    2. Seizure ([facility] [address]    3. Infestation by bed bug    4. Altered mental status, unspecified altered mental status type          Disposition  Patient's disposition: Admit to IMCU  Patient's condition is fair.        Ayana Rubi DO  03/01/22 1253

## 2022-03-01 ENCOUNTER — APPOINTMENT (OUTPATIENT)
Dept: MRI IMAGING | Age: 77
DRG: 871 | End: 2022-03-01
Payer: MEDICARE

## 2022-03-01 ENCOUNTER — APPOINTMENT (OUTPATIENT)
Dept: INTERVENTIONAL RADIOLOGY/VASCULAR | Age: 77
DRG: 871 | End: 2022-03-01
Payer: MEDICARE

## 2022-03-01 LAB
ALBUMIN SERPL-MCNC: 3.8 G/DL (ref 3.5–5.2)
ALP BLD-CCNC: 113 U/L (ref 40–129)
ALT SERPL-CCNC: 16 U/L (ref 0–40)
ANION GAP SERPL CALCULATED.3IONS-SCNC: 10 MMOL/L (ref 7–16)
APPEARANCE CSF: CLEAR
AST SERPL-CCNC: 39 U/L (ref 0–39)
BASOPHILS ABSOLUTE: 0.12 E9/L (ref 0–0.2)
BASOPHILS RELATIVE PERCENT: 0.9 % (ref 0–2)
BILIRUB SERPL-MCNC: 0.3 MG/DL (ref 0–1.2)
BUN BLDV-MCNC: 12 MG/DL (ref 6–23)
BURR CELLS: ABNORMAL
C-REACTIVE PROTEIN: 1.4 MG/DL (ref 0–0.4)
CALCIUM SERPL-MCNC: 9 MG/DL (ref 8.6–10.2)
CHLORIDE BLD-SCNC: 104 MMOL/L (ref 98–107)
CHOLESTEROL, TOTAL: 205 MG/DL (ref 0–199)
CO2: 24 MMOL/L (ref 22–29)
COLOR CSF: COLORLESS
CREAT SERPL-MCNC: 0.6 MG/DL (ref 0.7–1.2)
CRYPTOCOCCUS NEOFORMANS/GATTII CSF BY PCR: NOT DETECTED
CYTOMEGALOVIRUS CSF BY PCR: NOT DETECTED
EKG ATRIAL RATE: 84 BPM
EKG P AXIS: 47 DEGREES
EKG P-R INTERVAL: 138 MS
EKG Q-T INTERVAL: 390 MS
EKG QRS DURATION: 86 MS
EKG QTC CALCULATION (BAZETT): 460 MS
EKG R AXIS: 12 DEGREES
EKG T AXIS: 46 DEGREES
EKG VENTRICULAR RATE: 84 BPM
ENTEROVIRUS CSF BY PCR: NOT DETECTED
EOSINOPHILS ABSOLUTE: 0 E9/L (ref 0.05–0.5)
EOSINOPHILS RELATIVE PERCENT: 0 % (ref 0–6)
ESCHERICHIA COLI K1 CSF BY PCR: NOT DETECTED
GFR AFRICAN AMERICAN: >60
GFR NON-AFRICAN AMERICAN: >60 ML/MIN/1.73
GLUCOSE BLD-MCNC: 91 MG/DL (ref 74–99)
GLUCOSE, CSF: 144 MG/DL (ref 40–70)
GRAM STAIN ORDERABLE: NORMAL
HAEMOPHILUS INFLUENZAE CSF BY PCR: NOT DETECTED
HCT VFR BLD CALC: 40 % (ref 37–54)
HDLC SERPL-MCNC: 59 MG/DL
HEMOGLOBIN: 13.3 G/DL (ref 12.5–16.5)
HERPES SIMPLEX VIRUS 1 CSF BY PCR: NOT DETECTED
HERPES SIMPLEX VIRUS 2 CSF BY PCR: NOT DETECTED
HUMAN HERPESVIRUS 6 CSF BY PCR: NOT DETECTED
HUMAN PARECHOVIRUS CSF BY PCR: NOT DETECTED
INR BLD: 1
LDL CHOLESTEROL CALCULATED: 125 MG/DL (ref 0–99)
LISTERIA MONOCYTOGENES CSF BY PCR: NOT DETECTED
LYMPHOCYTES ABSOLUTE: 0.41 E9/L (ref 1.5–4)
LYMPHOCYTES RELATIVE PERCENT: 2.6 % (ref 20–42)
MAGNESIUM: 2 MG/DL (ref 1.6–2.6)
MAGNESIUM: 2 MG/DL (ref 1.6–2.6)
MCH RBC QN AUTO: 30.2 PG (ref 26–35)
MCHC RBC AUTO-ENTMCNC: 33.3 % (ref 32–34.5)
MCV RBC AUTO: 90.7 FL (ref 80–99.9)
METER GLUCOSE: 134 MG/DL (ref 74–99)
METER GLUCOSE: 141 MG/DL (ref 74–99)
METER GLUCOSE: 169 MG/DL (ref 74–99)
METER GLUCOSE: 174 MG/DL (ref 74–99)
METER GLUCOSE: 230 MG/DL (ref 74–99)
METER GLUCOSE: 245 MG/DL (ref 74–99)
METER GLUCOSE: 262 MG/DL (ref 74–99)
METER GLUCOSE: 266 MG/DL (ref 74–99)
METER GLUCOSE: 282 MG/DL (ref 74–99)
METER GLUCOSE: 82 MG/DL (ref 74–99)
METER GLUCOSE: 84 MG/DL (ref 74–99)
MONOCYTE, CSF: 67 % (ref 10–70)
MONOCYTES ABSOLUTE: 0.14 E9/L (ref 0.1–0.95)
MONOCYTES RELATIVE PERCENT: 0.9 % (ref 2–12)
NEISSERIA MENINGITIDIS CSF BY PCR: NOT DETECTED
NEUTROPHILS ABSOLUTE: 13.25 E9/L (ref 1.8–7.3)
NEUTROPHILS RELATIVE PERCENT: 95.7 % (ref 43–80)
NEUTROPHILS, CSF: 33 % (ref 0–10)
OVALOCYTES: ABNORMAL
PDW BLD-RTO: 13.4 FL (ref 11.5–15)
PHOSPHORUS: 2.1 MG/DL (ref 2.5–4.5)
PLATELET # BLD: 302 E9/L (ref 130–450)
PMV BLD AUTO: 8.6 FL (ref 7–12)
POIKILOCYTES: ABNORMAL
POTASSIUM SERPL-SCNC: 3.8 MMOL/L (ref 3.5–5)
PROCALCITONIN: 0.07 NG/ML (ref 0–0.08)
PROTEIN CSF: 78 MG/DL (ref 15–40)
PROTHROMBIN TIME: 11.5 SEC (ref 9.3–12.4)
RBC # BLD: 4.41 E12/L (ref 3.8–5.8)
RBC CSF: <2000 /UL
REASON FOR REJECTION: NORMAL
REJECTED TEST: NORMAL
SEDIMENTATION RATE, ERYTHROCYTE: 21 MM/HR (ref 0–15)
SODIUM BLD-SCNC: 138 MMOL/L (ref 132–146)
STREPTOCOCCUS AGALACTIAE CSF BY PCR: NOT DETECTED
STREPTOCOCCUS PNEUMONIAE CSF BY PCR: NOT DETECTED
TOTAL PROTEIN: 6.5 G/DL (ref 6.4–8.3)
TRIGL SERPL-MCNC: 104 MG/DL (ref 0–149)
TUBE NUMBER CSF: ABNORMAL
VARICELLA ZOSTER VIRUS CSF BY PCR: NOT DETECTED
VLDLC SERPL CALC-MCNC: 21 MG/DL
WBC # BLD: 13.8 E9/L (ref 4.5–11.5)
WBC CSF: 3 /UL (ref 0–2)

## 2022-03-01 PROCEDURE — 87205 SMEAR GRAM STAIN: CPT

## 2022-03-01 PROCEDURE — 83735 ASSAY OF MAGNESIUM: CPT

## 2022-03-01 PROCEDURE — 88108 CYTOPATH CONCENTRATE TECH: CPT

## 2022-03-01 PROCEDURE — 87070 CULTURE OTHR SPECIMN AEROBIC: CPT

## 2022-03-01 PROCEDURE — 93005 ELECTROCARDIOGRAM TRACING: CPT | Performed by: NURSE PRACTITIONER

## 2022-03-01 PROCEDURE — 87483 CNS DNA AMP PROBE TYPE 12-25: CPT

## 2022-03-01 PROCEDURE — 87529 HSV DNA AMP PROBE: CPT

## 2022-03-01 PROCEDURE — 009U3ZX DRAINAGE OF SPINAL CANAL, PERCUTANEOUS APPROACH, DIAGNOSTIC: ICD-10-PCS | Performed by: RADIOLOGY

## 2022-03-01 PROCEDURE — 2000000000 HC ICU R&B

## 2022-03-01 PROCEDURE — 6360000002 HC RX W HCPCS: Performed by: INTERNAL MEDICINE

## 2022-03-01 PROCEDURE — 85610 PROTHROMBIN TIME: CPT

## 2022-03-01 PROCEDURE — 86403 PARTICLE AGGLUT ANTBDY SCRN: CPT

## 2022-03-01 PROCEDURE — 85651 RBC SED RATE NONAUTOMATED: CPT

## 2022-03-01 PROCEDURE — 70553 MRI BRAIN STEM W/O & W/DYE: CPT

## 2022-03-01 PROCEDURE — 6370000000 HC RX 637 (ALT 250 FOR IP): Performed by: INTERNAL MEDICINE

## 2022-03-01 PROCEDURE — 6360000004 HC RX CONTRAST MEDICATION: Performed by: RADIOLOGY

## 2022-03-01 PROCEDURE — A9577 INJ MULTIHANCE: HCPCS | Performed by: RADIOLOGY

## 2022-03-01 PROCEDURE — 80061 LIPID PANEL: CPT

## 2022-03-01 PROCEDURE — 86140 C-REACTIVE PROTEIN: CPT

## 2022-03-01 PROCEDURE — 2580000003 HC RX 258: Performed by: INTERNAL MEDICINE

## 2022-03-01 PROCEDURE — 85025 COMPLETE CBC W/AUTO DIFF WBC: CPT

## 2022-03-01 PROCEDURE — 84145 PROCALCITONIN (PCT): CPT

## 2022-03-01 PROCEDURE — 82962 GLUCOSE BLOOD TEST: CPT

## 2022-03-01 PROCEDURE — 36415 COLL VENOUS BLD VENIPUNCTURE: CPT

## 2022-03-01 PROCEDURE — 62328 DX LMBR SPI PNXR W/FLUOR/CT: CPT

## 2022-03-01 PROCEDURE — 86618 LYME DISEASE ANTIBODY: CPT

## 2022-03-01 PROCEDURE — 86593 SYPHILIS TEST NON-TREP QUANT: CPT

## 2022-03-01 PROCEDURE — 86592 SYPHILIS TEST NON-TREP QUAL: CPT

## 2022-03-01 PROCEDURE — 6370000000 HC RX 637 (ALT 250 FOR IP): Performed by: NURSE PRACTITIONER

## 2022-03-01 PROCEDURE — 72156 MRI NECK SPINE W/O & W/DYE: CPT

## 2022-03-01 PROCEDURE — 2700000000 HC OXYGEN THERAPY PER DAY

## 2022-03-01 PROCEDURE — 82945 GLUCOSE OTHER FLUID: CPT

## 2022-03-01 PROCEDURE — 84100 ASSAY OF PHOSPHORUS: CPT

## 2022-03-01 PROCEDURE — 6360000002 HC RX W HCPCS: Performed by: NURSE PRACTITIONER

## 2022-03-01 PROCEDURE — 89051 BODY FLUID CELL COUNT: CPT

## 2022-03-01 PROCEDURE — 84157 ASSAY OF PROTEIN OTHER: CPT

## 2022-03-01 PROCEDURE — 80053 COMPREHEN METABOLIC PANEL: CPT

## 2022-03-01 RX ORDER — LORAZEPAM 2 MG/ML
3 INJECTION INTRAMUSCULAR
Status: DISCONTINUED | OUTPATIENT
Start: 2022-03-01 | End: 2022-03-05

## 2022-03-01 RX ORDER — SODIUM CHLORIDE 0.9 % (FLUSH) 0.9 %
5-40 SYRINGE (ML) INJECTION EVERY 12 HOURS SCHEDULED
Status: DISCONTINUED | OUTPATIENT
Start: 2022-03-01 | End: 2022-03-10 | Stop reason: HOSPADM

## 2022-03-01 RX ORDER — LORAZEPAM 2 MG/ML
0.5 INJECTION INTRAMUSCULAR EVERY 6 HOURS PRN
Status: DISCONTINUED | OUTPATIENT
Start: 2022-03-01 | End: 2022-03-10 | Stop reason: HOSPADM

## 2022-03-01 RX ORDER — INSULIN GLARGINE 100 [IU]/ML
6 INJECTION, SOLUTION SUBCUTANEOUS DAILY
Status: DISCONTINUED | OUTPATIENT
Start: 2022-03-01 | End: 2022-03-10 | Stop reason: HOSPADM

## 2022-03-01 RX ORDER — LORAZEPAM 2 MG/ML
2 INJECTION INTRAMUSCULAR
Status: DISCONTINUED | OUTPATIENT
Start: 2022-03-01 | End: 2022-03-05

## 2022-03-01 RX ORDER — SODIUM CHLORIDE 0.9 % (FLUSH) 0.9 %
5-40 SYRINGE (ML) INJECTION PRN
Status: DISCONTINUED | OUTPATIENT
Start: 2022-03-01 | End: 2022-03-10 | Stop reason: HOSPADM

## 2022-03-01 RX ORDER — HALOPERIDOL 5 MG/ML
2 INJECTION INTRAMUSCULAR EVERY 6 HOURS PRN
Status: DISCONTINUED | OUTPATIENT
Start: 2022-03-01 | End: 2022-03-01

## 2022-03-01 RX ORDER — LORAZEPAM 1 MG/1
3 TABLET ORAL
Status: DISCONTINUED | OUTPATIENT
Start: 2022-03-01 | End: 2022-03-05

## 2022-03-01 RX ORDER — DEXAMETHASONE SODIUM PHOSPHATE 10 MG/ML
10 INJECTION INTRAMUSCULAR; INTRAVENOUS EVERY 6 HOURS
Status: DISCONTINUED | OUTPATIENT
Start: 2022-03-01 | End: 2022-03-04

## 2022-03-01 RX ORDER — LORAZEPAM 2 MG/ML
4 INJECTION INTRAMUSCULAR
Status: DISCONTINUED | OUTPATIENT
Start: 2022-03-01 | End: 2022-03-05

## 2022-03-01 RX ORDER — LORAZEPAM 1 MG/1
4 TABLET ORAL
Status: DISCONTINUED | OUTPATIENT
Start: 2022-03-01 | End: 2022-03-05

## 2022-03-01 RX ORDER — HALOPERIDOL 5 MG/ML
2 INJECTION INTRAMUSCULAR EVERY 6 HOURS PRN
Status: DISCONTINUED | OUTPATIENT
Start: 2022-03-01 | End: 2022-03-03

## 2022-03-01 RX ORDER — SODIUM CHLORIDE 9 MG/ML
25 INJECTION, SOLUTION INTRAVENOUS PRN
Status: DISCONTINUED | OUTPATIENT
Start: 2022-03-01 | End: 2022-03-10 | Stop reason: HOSPADM

## 2022-03-01 RX ORDER — LORAZEPAM 1 MG/1
2 TABLET ORAL
Status: DISCONTINUED | OUTPATIENT
Start: 2022-03-01 | End: 2022-03-05

## 2022-03-01 RX ORDER — LORAZEPAM 1 MG/1
1 TABLET ORAL
Status: DISCONTINUED | OUTPATIENT
Start: 2022-03-01 | End: 2022-03-05

## 2022-03-01 RX ORDER — DEXMEDETOMIDINE HYDROCHLORIDE 4 UG/ML
.1-1.5 INJECTION, SOLUTION INTRAVENOUS CONTINUOUS
Status: DISCONTINUED | OUTPATIENT
Start: 2022-03-01 | End: 2022-03-01

## 2022-03-01 RX ORDER — LORAZEPAM 2 MG/ML
1 INJECTION INTRAMUSCULAR
Status: DISCONTINUED | OUTPATIENT
Start: 2022-03-01 | End: 2022-03-05

## 2022-03-01 RX ADMIN — SODIUM CHLORIDE, PRESERVATIVE FREE 10 ML: 5 INJECTION INTRAVENOUS at 22:07

## 2022-03-01 RX ADMIN — INSULIN LISPRO 4 UNITS: 100 INJECTION, SOLUTION INTRAVENOUS; SUBCUTANEOUS at 17:22

## 2022-03-01 RX ADMIN — LORAZEPAM 1 MG: 2 INJECTION INTRAMUSCULAR; INTRAVENOUS at 01:43

## 2022-03-01 RX ADMIN — HALOPERIDOL LACTATE 2 MG: 5 INJECTION, SOLUTION INTRAMUSCULAR at 12:56

## 2022-03-01 RX ADMIN — SODIUM CHLORIDE AND POTASSIUM CHLORIDE: 9; 1.49 INJECTION, SOLUTION INTRAVENOUS at 08:51

## 2022-03-01 RX ADMIN — INSULIN LISPRO 2 UNITS: 100 INJECTION, SOLUTION INTRAVENOUS; SUBCUTANEOUS at 21:15

## 2022-03-01 RX ADMIN — AMPICILLIN SODIUM 1000 MG: 1 INJECTION, POWDER, FOR SOLUTION INTRAMUSCULAR; INTRAVENOUS at 03:51

## 2022-03-01 RX ADMIN — Medication 5 ML: at 22:11

## 2022-03-01 RX ADMIN — SODIUM CHLORIDE AND POTASSIUM CHLORIDE: 9; 1.49 INJECTION, SOLUTION INTRAVENOUS at 18:38

## 2022-03-01 RX ADMIN — ACYCLOVIR SODIUM 650 MG: 50 INJECTION, SOLUTION INTRAVENOUS at 17:26

## 2022-03-01 RX ADMIN — VANCOMYCIN HYDROCHLORIDE 1000 MG: 1 INJECTION, POWDER, LYOPHILIZED, FOR SOLUTION INTRAVENOUS at 02:17

## 2022-03-01 RX ADMIN — WATER 2000 MG: 1 INJECTION INTRAMUSCULAR; INTRAVENOUS; SUBCUTANEOUS at 02:00

## 2022-03-01 RX ADMIN — VANCOMYCIN HYDROCHLORIDE 1000 MG: 1 INJECTION, POWDER, LYOPHILIZED, FOR SOLUTION INTRAVENOUS at 13:14

## 2022-03-01 RX ADMIN — AMPICILLIN SODIUM 1000 MG: 1 INJECTION, POWDER, FOR SOLUTION INTRAMUSCULAR; INTRAVENOUS at 14:02

## 2022-03-01 RX ADMIN — DEXAMETHASONE SODIUM PHOSPHATE 10 MG: 10 INJECTION INTRAMUSCULAR; INTRAVENOUS at 01:37

## 2022-03-01 RX ADMIN — INSULIN LISPRO 6 UNITS: 100 INJECTION, SOLUTION INTRAVENOUS; SUBCUTANEOUS at 12:29

## 2022-03-01 RX ADMIN — AMPICILLIN SODIUM 1000 MG: 1 INJECTION, POWDER, FOR SOLUTION INTRAMUSCULAR; INTRAVENOUS at 08:52

## 2022-03-01 RX ADMIN — AMPICILLIN SODIUM 1000 MG: 1 INJECTION, POWDER, FOR SOLUTION INTRAMUSCULAR; INTRAVENOUS at 21:20

## 2022-03-01 RX ADMIN — GADOBENATE DIMEGLUMINE 14 ML: 529 INJECTION, SOLUTION INTRAVENOUS at 16:33

## 2022-03-01 RX ADMIN — LEVETIRACETAM 500 MG: 5 INJECTION, SOLUTION INTRAVENOUS at 10:36

## 2022-03-01 RX ADMIN — THIAMINE HYDROCHLORIDE 500 MG: 100 INJECTION, SOLUTION INTRAMUSCULAR; INTRAVENOUS at 13:14

## 2022-03-01 RX ADMIN — AMPICILLIN SODIUM 1000 MG: 1 INJECTION, POWDER, FOR SOLUTION INTRAMUSCULAR; INTRAVENOUS at 17:27

## 2022-03-01 RX ADMIN — DEXAMETHASONE SODIUM PHOSPHATE 10 MG: 10 INJECTION INTRAMUSCULAR; INTRAVENOUS at 14:08

## 2022-03-01 RX ADMIN — WATER 2000 MG: 1 INJECTION INTRAMUSCULAR; INTRAVENOUS; SUBCUTANEOUS at 14:07

## 2022-03-01 RX ADMIN — DEXAMETHASONE SODIUM PHOSPHATE 10 MG: 10 INJECTION INTRAMUSCULAR; INTRAVENOUS at 08:42

## 2022-03-01 RX ADMIN — SODIUM CHLORIDE, PRESERVATIVE FREE 10 ML: 5 INJECTION INTRAVENOUS at 22:11

## 2022-03-01 RX ADMIN — THIAMINE HYDROCHLORIDE 500 MG: 100 INJECTION, SOLUTION INTRAMUSCULAR; INTRAVENOUS at 22:19

## 2022-03-01 RX ADMIN — INSULIN LISPRO 6 UNITS: 100 INJECTION, SOLUTION INTRAVENOUS; SUBCUTANEOUS at 08:40

## 2022-03-01 RX ADMIN — LORAZEPAM 1 MG: 2 INJECTION INTRAMUSCULAR; INTRAVENOUS at 14:47

## 2022-03-01 RX ADMIN — ACYCLOVIR SODIUM 650 MG: 50 INJECTION, SOLUTION INTRAVENOUS at 08:53

## 2022-03-01 RX ADMIN — LEVETIRACETAM 500 MG: 5 INJECTION, SOLUTION INTRAVENOUS at 22:04

## 2022-03-01 RX ADMIN — DEXAMETHASONE SODIUM PHOSPHATE 10 MG: 10 INJECTION INTRAMUSCULAR; INTRAVENOUS at 21:26

## 2022-03-01 RX ADMIN — ACYCLOVIR SODIUM 650 MG: 50 INJECTION, SOLUTION INTRAVENOUS at 02:03

## 2022-03-01 RX ADMIN — SODIUM CHLORIDE AND POTASSIUM CHLORIDE: 9; 1.49 INJECTION, SOLUTION INTRAVENOUS at 02:14

## 2022-03-01 RX ADMIN — INSULIN GLARGINE 6 UNITS: 100 INJECTION, SOLUTION SUBCUTANEOUS at 12:28

## 2022-03-01 NOTE — CARE COORDINATION
3/1/2022 ICU- AMS- unable to evaluate per nursing notes. Lummi, room air. Pt is a . No legal nok- has a friend- estranged from Son per SS information. pts home conditions poor- Adult Protection to be notified at discharge if pt returns to home. Home versus SNF- previously at [facility]. Therapies when pt can participate. CM/SS assigned to follow.  Electronically signed by MARINA Dupree on 3/1/2022 at 9:06 AM

## 2022-03-01 NOTE — CARE COORDINATION
SS note; SW received Vm from [employer] @ [address] [place] [address] [phone]. She noted can call her back to discuss pt's past rent due. However, it may be better to call Ivory Jiang Coordinator @ [phone]. She is familiar w/pt and was working w/him to pay his past due rent. Quirino Deleon is not sure if pt is interested in doing so but again can call her.    Electronically signed by SITA Jamil on 3/1/2022 at 3:46 PM

## 2022-03-01 NOTE — PROGRESS NOTES
Department of Internal Medicine        CHIEF COMPLAINT: Altered mental status, seizures    Reason for Admission: Altered mental status, seizure, hypoglycemia    HISTORY OF PRESENT ILLNESS:      The patient is a 78 y.o. male who presents with being brought in by paramedics. Apparently patient's neighbors called EMS when he heard the patient screaming and when they found him he was unresponsive. Paramedics came to check blood sugar which was reading high. There is no evidence of any trauma at the scene. Patient was incontinent of urine. The patient had tonic-clonic seizure in the ED and was treated with Ativan and Keppra. CT the head did not show any acute intracranial abnormality. Patient had a random blood sugar of 573 on admission. Liver enzymes essentially normal with a negative drug screen and a WBC 7.1 hemoglobin 13.6.    3/1/2022  Patient seen examined on ICU. The patient is much more alert and oriented currently than he was yesterday. Patient is oriented to person. Patient still weak with very poor memory. He denies any chest or abdominal pain. He denies any headaches or blurred vision. BUN/creatinine 12/0.6. Blood sugars ranging 200s. Hemoglobin A1c was 12.3. WBC 13.8 with hemoglobin 13.3. Sed rate is 21. Temperature 98.1 with heart rate 83 and blood pressure 150/69. O2 sat 98% on room air at rest.  Urine output is very good. Case discussed with Dr. Victorino Royal today. With this patient's altered mental status even though it has improved we are attempting to have IR to do a lumbar puncture. I think with the patient's presentation it would be a very good idea to have this done. Past Medical History:    No past medical history on file. Past Surgical History:    No past surgical history on file. Medications Prior to Admission:    @  Prior to Admission medications    Medication Sig Start Date End Date Taking?  Authorizing Provider   aspirin 81 MG EC tablet Take 1 tablet by mouth daily 7/27/21   Brielle Deluca DO   alogliptin (NESINA) 12.5 MG TABS tablet Take 1 tablet by mouth daily 7/27/21   Brielle Deluca DO   insulin lispro (HUMALOG) 100 UNIT/ML injection vial Inject 0-3 Units into the skin nightly 7/26/21   Brielle Deluca DO   insulin lispro (HUMALOG) 100 UNIT/ML injection vial Inject 0-6 Units into the skin 3 times daily (with meals) 7/26/21   Brielle Deluca DO   metFORMIN (GLUCOPHAGE) 1000 MG tablet Take 1 tablet by mouth 2 times daily (with meals) 7/26/21   Brielle Deluca DO   atorvastatin (LIPITOR) 20 MG tablet Take 1 tablet by mouth nightly 7/26/21   Brielle Deluca DO   lisinopril (PRINIVIL;ZESTRIL) 10 MG tablet Take 1 tablet by mouth daily 7/27/21   Brielle Deluca DO   thiamine mononitrate (THIAMINE) 100 MG tablet Take 1 tablet by mouth daily 7/27/21   Brielle Deluca DO       Allergies:  Patient has no allergy information on record. Social History:   Social History     Socioeconomic History    Marital status:      Spouse name: Not on file    Number of children: Not on file    Years of education: Not on file    Highest education level: Not on file   Occupational History    Not on file   Tobacco Use    Smoking status: Never Smoker    Smokeless tobacco: Never Used   Substance and Sexual Activity    Alcohol use: Not on file    Drug use: Not on file    Sexual activity: Not on file   Other Topics Concern    Not on file   Social History Narrative    Not on file     Social Determinants of Health     Financial Resource Strain:     Difficulty of Paying Living Expenses: Not on file   Food Insecurity:     Worried About Running Out of Food in the Last Year: Not on file    Cora of Food in the Last Year: Not on file   Transportation Needs:     Lack of Transportation (Medical): Not on file    Lack of Transportation (Non-Medical):  Not on file   Physical Activity:     Days of Exercise per Week: Not on file    Minutes of Exercise per Session: Not on file   Stress:     Feeling of Stress : Not on file   Social Connections:     Frequency of Communication with Friends and Family: Not on file    Frequency of Social Gatherings with Friends and Family: Not on file    Attends Rastafari Services: Not on file    Active Member of Clubs or Organizations: Not on file    Attends Club or Organization Meetings: Not on file    Marital Status: Not on file   Intimate Partner Violence:     Fear of Current or Ex-Partner: Not on file    Emotionally Abused: Not on file    Physically Abused: Not on file    Sexually Abused: Not on file   Housing Stability:     Unable to Pay for Housing in the Last Year: Not on file    Number of [place] in the Last Year: Not on file    Unstable Housing in the Last Year: Not on file       Family History:   No family history on file. REVIEW OF SYSTEMS: Unable to get information secondary to patient's current condition. Gen: Patient denies any lightheadedness or dizziness. No LOC or syncope. No fevers or chills. HEENT: No earache, sore throat or nasal congestion. Resp: Denies cough, hemoptysis or sputum production. Cardiac: Denies chest pain, SOB, diaphoresis or palpitations. GI: No nausea, vomiting, diarrhea or constipation. No melena or hematochezia. : No urinary complaints, dysuria, hematuria or frequency. MSK: No extremity weakness, paralysis or paresthesias. PHYSICAL EXAM:    Vitals:  BP (!) 150/69   Pulse 83   Temp 98.1 °F (36.7 °C) (Axillary)   Resp 14   Wt 151 lb 1.6 oz (68.5 kg)   SpO2 98%   BMI 24.39 kg/m²     General:  This is a 78 y.o. yo male who is unresponsive to verbal or painful stimuli at this time. HEENT:  Head is normocephalic and atraumatic, PERRLA, EOMI, mucus membranes dry with no pharyngeal erythema or exudate. Neck:  Supple with no carotid bruits, JVD or thyromegaly.   No cervical adenopathy  CV:  Regular rate and rhythm, 2/6 systolic murmurs  Lungs:  Clear to auscultation bilaterally with no wheezes, rales or rhonchi  Abdomen:  Soft, nontender, nondistended, bowel sounds present  Extremities:  No edema, peripheral pulses intact bilaterally  Neuro: Patient unresponsive to verbal or painful stimuli  Skin:  No rashes, lesions or wounds    DATA:  CBC with Differential:    Lab Results   Component Value Date    WBC 13.8 03/01/2022    RBC 4.41 03/01/2022    HGB 13.3 03/01/2022    HCT 40.0 03/01/2022     03/01/2022    MCV 90.7 03/01/2022    MCH 30.2 03/01/2022    MCHC 33.3 03/01/2022    RDW 13.4 03/01/2022    SEGSPCT 66 04/28/2011    LYMPHOPCT 2.6 03/01/2022    MONOPCT 0.9 03/01/2022    BASOPCT 0.9 03/01/2022    MONOSABS 0.14 03/01/2022    LYMPHSABS 0.41 03/01/2022    EOSABS 0.00 03/01/2022    BASOSABS 0.12 03/01/2022     CMP:    Lab Results   Component Value Date     03/01/2022    K 3.8 03/01/2022    K 3.9 07/22/2021     03/01/2022    CO2 24 03/01/2022    BUN 12 03/01/2022    CREATININE 0.6 03/01/2022    GFRAA >60 03/01/2022    LABGLOM >60 03/01/2022    GLUCOSE 91 03/01/2022    GLUCOSE 142 04/29/2011    PROT 6.5 03/01/2022    LABALBU 3.8 03/01/2022    CALCIUM 9.0 03/01/2022    BILITOT 0.3 03/01/2022    ALKPHOS 113 03/01/2022    AST 39 03/01/2022    ALT 16 03/01/2022     Magnesium:    Lab Results   Component Value Date    MG 2.0 03/01/2022     Phosphorus:    Lab Results   Component Value Date    PHOS 2.1 03/01/2022     PT/INR:    Lab Results   Component Value Date    PROTIME 11.5 03/01/2022    PROTIME 13.2 04/29/2011    INR 1.0 03/01/2022     Troponin:  No results found for: TROPONINI  U/A:    Lab Results   Component Value Date    COLORU Straw 02/28/2022    PROTEINU 30 02/28/2022    PHUR 7.0 02/28/2022    WBCUA 1-3 02/28/2022    RBCUA 0-1 02/28/2022    BACTERIA RARE 02/28/2022    CLARITYU Clear 02/28/2022    SPECGRAV 1.015 02/28/2022    LEUKOCYTESUR Negative 02/28/2022    UROBILINOGEN 0.2 02/28/2022    BILIRUBINUR Negative 02/28/2022    BLOODU TRACE 02/28/2022    GLUCOSEU >=1000 02/28/2022     ABG:    Lab Results   Component Value Date    PH 7.387 02/28/2022    PCO2 40.5 02/28/2022    PO2 116.9 02/28/2022    HCO3 23.8 02/28/2022    BE -1.1 02/28/2022    O2SAT 97.7 02/28/2022     HgBA1c:    Lab Results   Component Value Date    LABA1C 12.3 02/28/2022     FLP:    Lab Results   Component Value Date    TRIG 104 03/01/2022    HDL 59 03/01/2022    LDLCALC 125 03/01/2022    LABVLDL 21 03/01/2022     TSH:    Lab Results   Component Value Date    TSH 2.130 02/28/2022     IRON:  No results found for: IRON  LIPASE:  No results found for: LIPASE    ASSESSMENT AND PLAN:      Patient Active Problem List    Diagnosis Date Noted    Hyperosmolar hyperglycemic state (HHS) ([facility] [address] 02/28/2022    New onset seizure ([facility] [address] 02/28/2022    Altered mental status 02/28/2022    Delirium 07/22/2021     Impression:  1. Altered mental status -probable metabolic encephalopathy  2. Noninsulin dependent diabetes mellitus type 2hyperglycemic  3. New onset tonic-clonic seizure  4. Hypertension  5. History of medical noncompliance    Plan:  Home medications reviewed  IV fluids normal saline 20 KCl 100 cc an hour  Glucoscans 4 times daily with sliding scale insulin  Ativan 1 mg as needed for seizure  Consult neurology  Keppra 500 mg IV piggyback every 12 hours  Possible MRI of the brain if no improvement in mental status  Lovenox 40 mg subcu daily  Consult PT/OT  Hemoglobin A1c now    CMP, CBC in a.m.       Trav Beyer DO, D.OAna M  3/1/2022  11:11 AM

## 2022-03-01 NOTE — CARE COORDINATION
[facility].  Electronically signed by Osorio Martins on 3/1/2022 at 10:25 AM

## 2022-03-01 NOTE — CONSULTS
History Of Present Illness: Neighbors called EMS when they heard the patient screaming. When EMS arrived they state he was unresponsive. They checked his blood sugar which read as high. There was no evidence of any trauma at the scene. They state that he urinated on himself 3 times in route. They also noted bedbugs. Upon arrival when I enter the room patient is having seizure-like activity with repetitive movement on the right side. He is unable to follow any commands or respond. As above per ed staff. The patient is a 78 y.o. male with significant past medical history of see below who presents with above.       The patient has the following symptoms:    Change in level of consciousness: unresponsive    New Weakness: no    Numbness or Tingling: no    Difficulty Swallowing: yes    Current Medications:   Scheduled Meds:   acyclovir  10 mg/kg (Ideal) IntraVENous Q8H    ampicillin IV  1,000 mg IntraVENous 6 times per day    vancomycin  1,000 mg IntraVENous Q12H    dexamethasone  10 mg IntraVENous Q6H    insulin lispro  0-12 Units SubCUTAneous Q4H    insulin lispro  0-12 Units SubCUTAneous Q4H    cefTRIAXone (ROCEPHIN) IV  2,000 mg IntraVENous Q12H    insulin glargine  6 Units SubCUTAneous Daily    vitamin B-1  100 mg Oral Daily    atorvastatin  20 mg Oral Nightly    aspirin  81 mg Oral Daily    alogliptin  12.5 mg Oral Daily    [Held by provider] metFORMIN  1,000 mg Oral BID WC    enoxaparin  40 mg SubCUTAneous Daily    [Held by provider] insulin lispro  0-12 Units SubCUTAneous TID WC    [Held by provider] insulin lispro  0-6 Units SubCUTAneous Nightly    levETIRAcetam  500 mg IntraVENous BID     Continuous Infusions:   dexmedetomidine HCl in NaCl      0.9% NaCl with KCl 20 mEq 150 mL/hr at 03/01/22 0851    dextrose      dextrose 5 % and 0.45 % NaCl      [Held by provider] insulin Stopped (03/01/22 0657)    dextrose       PRN Meds:haloperidol lactate, acetaminophen, acetaminophen, polyethylene glycol, prochlorperazine, dextrose, dextrose 5 % and 0.45 % NaCl, glucose, glucagon (rDNA), dextrose, dextrose bolus (hypoglycemia) **OR** dextrose bolus (hypoglycemia)    Allergies:  Patient has no allergy information on record. Social History:   TOBACCO:   reports that he has never smoked. He has never used smokeless tobacco.  ETOH:   has no history on file for alcohol use. Past Medical History:    No past medical history on file. Past Surgical History:    No past surgical history on file. Outside reports reviewed: ER records, historical medical records, lab reports and radiology reports. Patient's medications, allergies, past medical, surgical, social and family histories were reviewed and updated as appropriate. Review of Systems  A comprehensive review of systems was negative except for:       Objective:     Neuro exam 150/70 p 60 t 98  General: comatose. Neck supple  Cranial nerve testing  unable to cooperate. PERRL, corneal reflexes +  . Funduscopic eye exam revealed not testable. Motor exam: . Rometta Favre Deep tendon reflexes were absent bilaterally. Plantar responses were flexor bilaterally. Cerebellar exam noted . Rometta Favre Sensation was . Rometta Favre       Assessment:   Coma associated with hyperglycemia/?hyperosmolarity; seizure like activity witnessed  Head and neck ct scans negative      Plan:   Suggest stopping all sedation  LP pending; empiric antibiotics as per ID (cover meningitis/encephalitis)  EEG  Agree with keppra  Thanks for consult

## 2022-03-01 NOTE — PROGRESS NOTES
CRITICAL CARE PROGRESS NOTE    I met with the patient's son at bedside, the patient was not able to recognize him and this is very concerning to his son (and to us); the patient drinks 2-3 beers once in a while but the patient's son has not interacted with him for couple of years therefore this may have changed. The patient is shaky, possibly with alcohol withdrawal syndrome, will start CIWA with lorazepam.    Patient to have MRI head/neck and LP. The patient's son consented to the procedure.     Hi Johnson MD  Pulmonary and Critical Care Medicine

## 2022-03-01 NOTE — PROGRESS NOTES
CRITICAL CARE PROGRESS NOTE    Pt unable to provide any information or consent as he is confused. I called the patient's son Mr Judah Bhandari, no answer, unable to obtain consent for LP. I discussed the need for LP to evaluate for infection with Dr Abdi Bess, he agrees to obtain procedure as this is the best interest of the patient. The patient got MRI head andneck on July 2021, new CXR and CT scan of neck and head do not show metal.    I called Ms Panfilo Vela, she states she brings him food to the house but does not know any family members.      Nayeli Anthony MD  Pulmonary and Critical Care Medicine

## 2022-03-01 NOTE — PLAN OF CARE
Problem: Skin Integrity:  Goal: Will show no infection signs and symptoms  Description: Will show no infection signs and symptoms  3/1/2022 0751 by Debbi Vicente RN  Outcome: Met This Shift     Problem: Skin Integrity:  Goal: Absence of new skin breakdown  Description: Absence of new skin breakdown  3/1/2022 0751 by Debbi Vicente RN  Outcome: Met This Shift     Problem: Non-Violent Restraints  Goal: No harm/injury to patient while restraints in use  Outcome: Met This Shift     Problem: Non-Violent Restraints  Goal: Patient's dignity will be maintained  Outcome: Met This Shift     Problem: Non-Violent Restraints  Goal: Removal from restraints as soon as assessed to be safe  Outcome: Not Met This Shift  Note: Pt continues to be at risk for pulling lines and tubes

## 2022-03-01 NOTE — CONSULTS
Pulmonary/Critical Care Consult Note    CHIEF COMPLAINT: Altered mental status, hyperglycemia, seizure like activity, and fever of unknown origin    HISTORY OF PRESENT ILLNESS: Patient is a 62-year-old male with no significant medical history. Patient arrived to ED via EMS for evaluation for altered mental status. Apparently the patient's neighbor called EMS after hearing the patient screaming. Patient was unresponsive when EMS arrived to transport patient to the ED. Initial blood glucose read high. The patient also had urinary incontinence and there was no evidence of trauma noted by EMS. Patient had witnessed seizure activity in the ED prior to admission. He was unable to provide any history due to altered mental status. CT head without contrast obtained in ED prior to admission showed no acute intracranial abnormality and small vessel ischemic disease. Mild mucosal thickening within the bilateral maxillary sinuses were noted as incidental findings. CT cervical spine without contrast showed no acute fracture or subluxation. Degenerative changes noted. There are nonspecific lucent lesions within the C2 body and left lateral mass as well as sclerotic lesion within the C3 spinous process. Portable chest x-ray obtained in ED showed no acute cardia pulmonary abnormality. ALLERGY:  Patient has no allergy information on record. FAMILY HISTORY:  No family history on file.     SOCIAL HISTORY:  Social History     Socioeconomic History    Marital status:      Spouse name: Not on file    Number of children: Not on file    Years of education: Not on file    Highest education level: Not on file   Occupational History    Not on file   Tobacco Use    Smoking status: Never Smoker    Smokeless tobacco: Never Used   Substance and Sexual Activity    Alcohol use: Not on file    Drug use: Not on file    Sexual activity: Not on file   Other Topics Concern    Not on file   Social History Narrative    Not on file     Social Determinants of Health     Financial Resource Strain:     Difficulty of Paying Living Expenses: Not on file   Food Insecurity:     Worried About [address] in the Last Year: Not on file    Ran Out of Food in the Last Year: Not on file   Transportation Needs:     Lack of Transportation (Medical): Not on file    Lack of Transportation (Non-Medical): Not on file   Physical Activity:     Days of Exercise per Week: Not on file    Minutes of Exercise per Session: Not on file   Stress:     Feeling of Stress : Not on file   Social Connections:     Frequency of Communication with Friends and Family: Not on file    Frequency of Social Gatherings with Friends and Family: Not on file    Attends Voodoo Services: Not on file    Active Member of Clubs or Organizations: Not on file    Attends Club or Organization Meetings: Not on file    Marital Status: Not on file   Intimate Partner Violence:     Fear of Current or Ex-Partner: Not on file    Emotionally Abused: Not on file    Physically Abused: Not on file    Sexually Abused: Not on file   Housing Stability:     Unable to Pay for Housing in the Last Year: Not on file    Number of [place] in the Last Year: Not on file    Unstable Housing in the Last Year: Not on file       MEDICAL HISTORY:  No past medical history on file.     MEDICATIONS:   vitamin B-1  100 mg Oral Daily    atorvastatin  20 mg Oral Nightly    aspirin  81 mg Oral Daily    alogliptin  12.5 mg Oral Daily    metFORMIN  1,000 mg Oral BID WC    enoxaparin  40 mg SubCUTAneous Daily    [Held by provider] insulin lispro  0-12 Units SubCUTAneous TID WC    [Held by provider] insulin lispro  0-6 Units SubCUTAneous Nightly    levETIRAcetam  500 mg IntraVENous BID      0.9% NaCl with KCl 20 mEq 150 mL/hr at 02/28/22 2307    dextrose      dextrose 5 % and 0.45 % NaCl      insulin       acetaminophen, acetaminophen, polyethylene glycol, prochlorperazine, LORazepam, glucose, glucagon (rDNA), dextrose, dextrose bolus (hypoglycemia) **OR** dextrose bolus (hypoglycemia), potassium chloride, magnesium sulfate, sodium phosphate IVPB **OR** sodium phosphate IVPB **OR** sodium phosphate IVPB, dextrose 5 % and 0.45 % NaCl, dextrose bolus (hypoglycemia)    REVIEW OF SYSTEMS:  Able to obtain review of systems due to altered mental status    PHYSICAL EXAM:  Vitals:    02/28/22 2220   BP: (!) 155/109   Pulse: 127   Resp: 22   Temp: 99.1 °F (37.3 °C)   SpO2: 94%        O2 Flow Rate (L/min): 4 L/min  O2 Device: None (Room air)    Constitutional: Fever noted, chills, diaphoresis  HEENT: Normocephalic, no head lesions or evidence of trauma, PERRL, mouth without lesions, no nasal lesions, no cervical adenopathy palpated   Respiratory: Lungs with equal breath sounds bilaterally, no adventitious sounds auscultated, no accessory muscle use   CV: Regular rate, no murmurs, JVD, no leg edema   Abdomen: Soft, non tender, + bowel sounds, no lesions   Skin: Warm to touch, adequate turgor, no rash, capillary refill <2 seconds   Extremities: Unable to assess muscle strength because patient cannot follow commands, moves 4 limbs spontaneously, distal pulses present   Neurology: Awake and alert, agitated, PERRLA, gag intact, corneal reflex present, disoriented to person place and time, unable to follow commands, moves 4 limbs spontaneously, neck is supple, no meningitic signs present, generalized muscle rigidity noted, negative Babinski's.         LABS:  WBC   Date Value Ref Range Status   02/28/2022 7.1 4.5 - 11.5 E9/L Final   07/23/2021 6.7 4.5 - 11.5 E9/L Final   07/22/2021 6.4 4.5 - 11.5 E9/L Final     Hemoglobin   Date Value Ref Range Status   02/28/2022 13.6 12.5 - 16.5 g/dL Final   07/23/2021 14.6 12.5 - 16.5 g/dL Final   07/22/2021 13.6 12.5 - 16.5 g/dL Final     Hematocrit   Date Value Ref Range Status   02/28/2022 41.3 37.0 - 54.0 % Final   07/23/2021 42.8 37.0 - 54.0 % Final   07/22/2021 41.2 37.0 - 54.0 % Final     MCV Date Value Ref Range Status   02/28/2022 91.8 80.0 - 99.9 fL Final   07/23/2021 87.9 80.0 - 99.9 fL Final   07/22/2021 90.7 80.0 - 99.9 fL Final     Platelets   Date Value Ref Range Status   02/28/2022 301 130 - 450 E9/L Final   07/23/2021 305 130 - 450 E9/L Final   07/22/2021 297 130 - 450 E9/L Final     Sodium   Date Value Ref Range Status   02/28/2022 132 132 - 146 mmol/L Final   07/24/2021 139 132 - 146 mmol/L Final   07/23/2021 137 132 - 146 mmol/L Final     Potassium   Date Value Ref Range Status   02/28/2022 4.0 3.5 - 5.0 mmol/L Final   07/24/2021 3.6 3.5 - 5.0 mmol/L Final   07/23/2021 3.5 3.5 - 5.0 mmol/L Final     Potassium reflex Magnesium   Date Value Ref Range Status   07/22/2021 3.9 3.5 - 5.0 mmol/L Final     Chloride   Date Value Ref Range Status   02/28/2022 95 (L) 98 - 107 mmol/L Final   07/24/2021 102 98 - 107 mmol/L Final   07/23/2021 103 98 - 107 mmol/L Final     CO2   Date Value Ref Range Status   02/28/2022 27 22 - 29 mmol/L Final   07/24/2021 26 22 - 29 mmol/L Final   07/23/2021 24 22 - 29 mmol/L Final     BUN   Date Value Ref Range Status   02/28/2022 11 6 - 23 mg/dL Final   07/24/2021 20 6 - 23 mg/dL Final   07/23/2021 14 6 - 23 mg/dL Final     CREATININE   Date Value Ref Range Status   02/28/2022 0.7 0.7 - 1.2 mg/dL Final   07/24/2021 0.7 0.7 - 1.2 mg/dL Final   07/23/2021 0.7 0.7 - 1.2 mg/dL Final     Glucose   Date Value Ref Range Status   02/28/2022 334 (H) 74 - 99 mg/dL Final   02/28/2022 573 (HH) 74 - 99 mg/dL Final   07/24/2021 250 (H) 74 - 99 mg/dL Final   04/29/2011 142 (H) 70 - 110 mg/dL Final   04/28/2011 140 (H) 70 - 110 mg/dL Final   04/22/2011 111 (H) 70 - 110 mg/dL Final     Calcium   Date Value Ref Range Status   02/28/2022 9.5 8.6 - 10.2 mg/dL Final   07/24/2021 8.9 8.6 - 10.2 mg/dL Final   07/23/2021 9.0 8.6 - 10.2 mg/dL Final     Total Protein   Date Value Ref Range Status   02/28/2022 7.2 6.4 - 8.3 g/dL Final   07/23/2021 5.9 (L) 6.4 - 8.3 g/dL Final   07/22/2021 5.9 (L) 6.4 - 8.3 g/dL Final     Albumin   Date Value Ref Range Status   02/28/2022 4.3 3.5 - 5.2 g/dL Final   07/23/2021 3.4 (L) 3.5 - 5.2 g/dL Final   07/22/2021 3.6 3.5 - 5.2 g/dL Final     Total Bilirubin   Date Value Ref Range Status   02/28/2022 0.4 0.0 - 1.2 mg/dL Final   07/23/2021 0.5 0.0 - 1.2 mg/dL Final   07/22/2021 0.5 0.0 - 1.2 mg/dL Final     Alkaline Phosphatase   Date Value Ref Range Status   02/28/2022 263 (H) 40 - 129 U/L Final   07/23/2021 102 40 - 129 U/L Final   07/22/2021 111 40 - 129 U/L Final     AST   Date Value Ref Range Status   02/28/2022 16 0 - 39 U/L Final   07/23/2021 17 0 - 39 U/L Final   07/22/2021 19 0 - 39 U/L Final     Comment:     Specimen is slightly Hemolyzed. Result may be artificially increased. ALT   Date Value Ref Range Status   02/28/2022 16 0 - 40 U/L Final   07/23/2021 20 0 - 40 U/L Final   07/22/2021 22 0 - 40 U/L Final     GFR Non-   Date Value Ref Range Status   02/28/2022 >60 >=60 mL/min/1.73 Final     Comment:     Chronic Kidney Disease: less than 60 ml/min/1.73 sq.m. Kidney Failure: less than 15 ml/min/1.73 sq.m. Results valid for patients 18 years and older. 07/24/2021 >60 >=60 mL/min/1.73 Final     Comment:     Chronic Kidney Disease: less than 60 ml/min/1.73 sq.m. Kidney Failure: less than 15 ml/min/1.73 sq.m. Results valid for patients 18 years and older. 07/23/2021 >60 >=60 mL/min/1.73 Final     Comment:     Chronic Kidney Disease: less than 60 ml/min/1.73 sq.m. Kidney Failure: less than 15 ml/min/1.73 sq.m. Results valid for patients 18 years and older.        GFR    Date Value Ref Range Status   02/28/2022 >60  Final   07/24/2021 >60  Final   07/23/2021 >60  Final     Magnesium   Date Value Ref Range Status   07/23/2021 2.0 1.6 - 2.6 mg/dL Final   04/29/2011 2.3 1.3 - 2.7 mg/dL Final     Phosphorus   Date Value Ref Range Status   07/23/2021 3.2 2.5 - 4.5 mg/dL Final     Recent Labs 02/28/22  1213   PH 7.387   PO2 116.9*   PCO2 40.5   HCO3 23.8   BE -1.1   O2SAT 97.7       RADIOLOGY:  CT HEAD WO CONTRAST   Final Result   1. No acute intracranial abnormality. 2. Chronic small vessel ischemic disease. CT CERVICAL SPINE WO CONTRAST   Final Result   1. No acute fracture or subluxation. 2. Degenerative changes in the cervical spine with central canal stenosis at   C3-4 and multilevel neural foraminal narrowing. 3. There are nonspecific lucent lesions within the C2 body and left lateral   mass as well as a sclerotic lesion within the C3 spinous process. These may   represent benign findings. Consider MRI cervical spine or bone scan if the   patient has history of malignancy. XR CHEST PORTABLE   Final Result   The cardiac silhouette and interstitial markings are somewhat prominent could   be accentuated by more shallow inspiration lighter technique compared to   prior exam.  However, consider upright PA and lateral views when the patient   is able to ensure resolution.              IMPRESSION:  Metabolic encephalopathy  Hyperglycemia  Type 2 diabetes-poorly controlled A1c on admission was 12.3  Fever of unknown origin  Possible meningitis  C2 and C3 lesions  Hyperlipidemia    PLAN:  N.p.o.  Seizure precautions  Consult IR for lumbar puncture  Blood culture sent  Neurology consulted- for altered mental status and new onset seizures  Continue Keppra 500 mg every 12 hours and Ativan as needed for seizure activity  Continue ampicillin, Rocephin, vancomycin, Zovirax, and Decadron for now  Consult pharmacy for vancomycin dosing  DC insulin drip  Accu-Cheks every 4 hours with medium sliding scale insulin coverage  Neurochecks every 4  Cardiac telemetry  Lovenox 40 mg subcu daily for DVT prophylaxis  Fall precautions      Electronically signed by ZEKE Martinez CNP on 2/28/2022 at 11:08 PM    ATTESTATION:  ICU Staff Physician note of personal involvement in Care  As the attending physician, I certify that I personally reviewed the patients history and personally examined the patient to confirm the physical findings described above,  And that I reviewed the relevant imaging studies and available reports. I also discussed the differential diagnosis and all of the proposed management plans with the patient and individuals accompanying the patient to this visit. They had the opportunity to ask questions about the proposed management plans and to have those questions answered. This patient has a high probability of sudden, clinically significant deterioration, which requires the highest level of physician preparedness to intervene urgently. I managed/supervised life or organ supporting interventions that required frequent physician assessment. I devoted my full attention to the direct care of this patient for the amount of time indicated below. Time I spent with the family or surrogate(s) is included only if the patient was incapable of providing the necessary information or participating in medical decisions - Time devoted to teaching and to any procedures I billed separately is not included.     CRITICAL CARE TIME:  30 minutes    Moncho Pham MD  Pulmonary and Critical Care Medicine

## 2022-03-01 NOTE — ACP (ADVANCE CARE PLANNING)
Advance Care Planning   Healthcare Decision Maker: At this time there is listed -only an [place] ([facility] son Jacob Velázquez and a friend Jolene Mendoza. NO decision maker, two doctors signatures required at this time.       Electronically signed by MARINA Gabriel on 3/1/2022 at 7:35 AM

## 2022-03-01 NOTE — PROGRESS NOTES
CRITICAL CARE PROGRESS NOTE    The patient's case was discussed in multidisciplinary rounds including critical care specialist, nursing, RT and pharmacy. 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 The patient also has cervical lesions, he is being evaluated for discitis. --Antibiotic regimen: Ceftriaxone, ampicillin, vancomycin, acyclorir and dexamethasone  --MRI C- spine to be obtained as well as lumbar puncture  --Cultures reviewed  --Seizure control with Keppra   --ID and neurology consult    2) Uncontrolled diabetes mellitus with hyperosmolar state  --Management with insulin administration, now off insulin infusion      DVT prophylaxis with enoxaparin SQ  Nutrition: NPO   GI prophylaxis with PPI  Physical therapy ordered  Goals of care: Full code      /65   Pulse 59   Temp 98.1 °F (36.7 °C) (Axillary)   Resp 14   Wt 151 lb 1.6 oz (68.5 kg)   SpO2 99%   BMI 24.39 kg/m²   General: Awake, he is alert, he tracks me and follows commands, confused and unable to tell me what happened or where he is, states he is at home. HEENT: No head lesions, PERRL, EOMI, mouth without lesions, no nasal lesions, no cervical adenopathy palpated  Respiratory: Lungs with equal breath sounds bilaterally, no adventitious sounds auscultated, no accessory muscle use  CV: Regular rate, no murmurs, no JVD, no leg edema  Abdomen: Soft, non tender, + bowel sounds, no lesions  Skin: Hydrated, adequate turgor, multiple scans on skin, capillary refill <2 seconds  Extremities: Muscular strength 3/5 in 4 limbs, moves 4 limbs spontaneously, distal pulses present  Neurology: Awake and alert, confused, follows commands, moves 4 limbs on command and spontaneously, equal sensation, no dysmetria, neck is supple, no meningitic signs present.     Last 3 CMP: Recent Labs     02/28/22  1037 02/28/22  1901 03/01/22  0447     --  138   K 4.0  --  3.8   CL 95*  --  104   CO2 27  --  24   BUN 11  --  12   CREATININE 0.7  --  0.6*   GLUCOSE 573* 334* 91   CALCIUM 9.5  --  9.0   PROT 7.2  --  6.5   LABALBU 4.3  --  3.8   BILITOT 0.4  --  0.3   ALKPHOS 263*  --  113   AST 16  --  39   ALT 16  --  16     Recent Labs     03/01/22  0447   WBC 13.8*   RBC 4.41   HGB 13.3   HCT 40.0   MCV 90.7   MCH 30.2   MCHC 33.3   RDW 13.4      MPV 8.6       No results for input(s): BC in the last 72 hours. No results for input(s): [place] in the last 72 hours.     24 HR INTAKE/OUTPUT:      Intake/Output Summary (Last 24 hours) at 3/1/2022 1207  Last data filed at 3/1/2022 0700  Gross per 24 hour   Intake 2229.4 ml   Output 2750 ml   Net -520.6 ml     MEDICATIONS:   acyclovir  10 mg/kg (Ideal) IntraVENous Q8H    ampicillin IV  1,000 mg IntraVENous 6 times per day    vancomycin  1,000 mg IntraVENous Q12H    dexamethasone  10 mg IntraVENous Q6H    insulin lispro  0-12 Units SubCUTAneous Q4H    insulin lispro  0-12 Units SubCUTAneous Q4H    cefTRIAXone (ROCEPHIN) IV  2,000 mg IntraVENous Q12H    vitamin B-1  100 mg Oral Daily    atorvastatin  20 mg Oral Nightly    aspirin  81 mg Oral Daily    alogliptin  12.5 mg Oral Daily    [Held by provider] metFORMIN  1,000 mg Oral BID WC    enoxaparin  40 mg SubCUTAneous Daily    [Held by provider] insulin lispro  0-12 Units SubCUTAneous TID WC    [Held by provider] insulin lispro  0-6 Units SubCUTAneous Nightly    levETIRAcetam  500 mg IntraVENous BID      dexmedetomidine HCl in NaCl      0.9% NaCl with KCl 20 mEq 150 mL/hr at 03/01/22 0851    dextrose      dextrose 5 % and 0.45 % NaCl      [Held by provider] insulin Stopped (03/01/22 0657)    dextrose       haloperidol lactate, acetaminophen, acetaminophen, polyethylene glycol, prochlorperazine, dextrose, dextrose 5 % and 0.45 % NaCl, glucose, glucagon (rDNA), dextrose, dextrose bolus (hypoglycemia) **OR** dextrose bolus (hypoglycemia)    OBJECTIVE:  Vitals:    03/01/22 1000   BP: (!) 150/69   Pulse: 83   Resp: 14   Temp:    SpO2: 98%        O2 Flow Rate (L/min): 4 L/min  O2 Device: None (Room air)        LABS:  WBC   Date Value Ref Range Status   03/01/2022 13.8 (H) 4.5 - 11.5 E9/L Final   02/28/2022 7.1 4.5 - 11.5 E9/L Final   07/23/2021 6.7 4.5 - 11.5 E9/L Final     Hemoglobin   Date Value Ref Range Status   03/01/2022 13.3 12.5 - 16.5 g/dL Final   02/28/2022 13.6 12.5 - 16.5 g/dL Final   07/23/2021 14.6 12.5 - 16.5 g/dL Final     Hematocrit   Date Value Ref Range Status   03/01/2022 40.0 37.0 - 54.0 % Final   02/28/2022 41.3 37.0 - 54.0 % Final   07/23/2021 42.8 37.0 - 54.0 % Final     MCV   Date Value Ref Range Status   03/01/2022 90.7 80.0 - 99.9 fL Final   02/28/2022 91.8 80.0 - 99.9 fL Final   07/23/2021 87.9 80.0 - 99.9 fL Final     Platelets   Date Value Ref Range Status   03/01/2022 302 130 - 450 E9/L Final   02/28/2022 301 130 - 450 E9/L Final   07/23/2021 305 130 - 450 E9/L Final     Sodium   Date Value Ref Range Status   03/01/2022 138 132 - 146 mmol/L Final   02/28/2022 132 132 - 146 mmol/L Final   07/24/2021 139 132 - 146 mmol/L Final     Potassium   Date Value Ref Range Status   03/01/2022 3.8 3.5 - 5.0 mmol/L Final   02/28/2022 4.0 3.5 - 5.0 mmol/L Final   07/24/2021 3.6 3.5 - 5.0 mmol/L Final     Potassium reflex Magnesium   Date Value Ref Range Status   07/22/2021 3.9 3.5 - 5.0 mmol/L Final     Chloride   Date Value Ref Range Status   03/01/2022 104 98 - 107 mmol/L Final   02/28/2022 95 (L) 98 - 107 mmol/L Final   07/24/2021 102 98 - 107 mmol/L Final     CO2   Date Value Ref Range Status   03/01/2022 24 22 - 29 mmol/L Final   02/28/2022 27 22 - 29 mmol/L Final   07/24/2021 26 22 - 29 mmol/L Final     BUN   Date Value Ref Range Status   03/01/2022 12 6 - 23 mg/dL Final   02/28/2022 11 6 - 23 mg/dL Final   07/24/2021 20 6 - 23 mg/dL Final     CREATININE   Date Value Ref Range Status   03/01/2022 0.6 (L) 0.7 - 1.2 mg/dL Final   02/28/2022 0.7 0.7 - 1.2 mg/dL Final   07/24/2021 0.7 0.7 - 1.2 mg/dL Final     Glucose   Date Value Ref Range Status   03/01/2022 91 74 - 99 mg/dL Final   02/28/2022 334 (H) 74 - 99 mg/dL Final   02/28/2022 573 (HH) 74 - 99 mg/dL Final   04/29/2011 142 (H) 70 - 110 mg/dL Final   04/28/2011 140 (H) 70 - 110 mg/dL Final   04/22/2011 111 (H) 70 - 110 mg/dL Final     Calcium   Date Value Ref Range Status   03/01/2022 9.0 8.6 - 10.2 mg/dL Final   02/28/2022 9.5 8.6 - 10.2 mg/dL Final   07/24/2021 8.9 8.6 - 10.2 mg/dL Final     Total Protein   Date Value Ref Range Status   03/01/2022 6.5 6.4 - 8.3 g/dL Final   02/28/2022 7.2 6.4 - 8.3 g/dL Final   07/23/2021 5.9 (L) 6.4 - 8.3 g/dL Final     Albumin   Date Value Ref Range Status   03/01/2022 3.8 3.5 - 5.2 g/dL Final   02/28/2022 4.3 3.5 - 5.2 g/dL Final   07/23/2021 3.4 (L) 3.5 - 5.2 g/dL Final     Total Bilirubin   Date Value Ref Range Status   03/01/2022 0.3 0.0 - 1.2 mg/dL Final   02/28/2022 0.4 0.0 - 1.2 mg/dL Final   07/23/2021 0.5 0.0 - 1.2 mg/dL Final     Alkaline Phosphatase   Date Value Ref Range Status   03/01/2022 113 40 - 129 U/L Final   02/28/2022 263 (H) 40 - 129 U/L Final   07/23/2021 102 40 - 129 U/L Final     AST   Date Value Ref Range Status   03/01/2022 39 0 - 39 U/L Final   02/28/2022 16 0 - 39 U/L Final   07/23/2021 17 0 - 39 U/L Final     ALT   Date Value Ref Range Status   03/01/2022 16 0 - 40 U/L Final   02/28/2022 16 0 - 40 U/L Final   07/23/2021 20 0 - 40 U/L Final     GFR Non-   Date Value Ref Range Status   03/01/2022 >60 >=60 mL/min/1.73 Final     Comment:     Chronic Kidney Disease: less than 60 ml/min/1.73 sq.m. Kidney Failure: less than 15 ml/min/1.73 sq.m. Results valid for patients 18 years and older. 02/28/2022 >60 >=60 mL/min/1.73 Final     Comment:     Chronic Kidney Disease: less than 60 ml/min/1.73 sq.m.           Kidney Failure: less than 15 ml/min/1.73 sq.m. Results valid for patients 18 years and older. 07/24/2021 >60 >=60 mL/min/1.73 Final     Comment:     Chronic Kidney Disease: less than 60 ml/min/1.73 sq.m. Kidney Failure: less than 15 ml/min/1.73 sq.m. Results valid for patients 18 years and older. GFR    Date Value Ref Range Status   03/01/2022 >60  Final   02/28/2022 >60  Final   07/24/2021 >60  Final     Magnesium   Date Value Ref Range Status   03/01/2022 2.0 1.6 - 2.6 mg/dL Final   03/01/2022 2.0 1.6 - 2.6 mg/dL Final   07/23/2021 2.0 1.6 - 2.6 mg/dL Final     Phosphorus   Date Value Ref Range Status   03/01/2022 2.1 (L) 2.5 - 4.5 mg/dL Final   07/23/2021 3.2 2.5 - 4.5 mg/dL Final     Recent Labs     02/28/22  1213   PH 7.387   PO2 116.9*   PCO2 40.5   HCO3 23.8   BE -1.1   O2SAT 97.7       RADIOLOGY:  CT HEAD WO CONTRAST   Final Result   1. No acute intracranial abnormality. 2. Chronic small vessel ischemic disease. CT CERVICAL SPINE WO CONTRAST   Final Result   1. No acute fracture or subluxation. 2. Degenerative changes in the cervical spine with central canal stenosis at   C3-4 and multilevel neural foraminal narrowing. 3. There are nonspecific lucent lesions within the C2 body and left lateral   mass as well as a sclerotic lesion within the C3 spinous process. These may   represent benign findings. Consider MRI cervical spine or bone scan if the   patient has history of malignancy. XR CHEST PORTABLE   Final Result   The cardiac silhouette and interstitial markings are somewhat prominent could   be accentuated by more shallow inspiration lighter technique compared to   prior exam.  However, consider upright PA and lateral views when the patient   is able to ensure resolution.          IR INTERVENTIONAL RADIOLOGY PROCEDURE REQUEST    (Results Pending)   MRI CERVICAL SPINE W WO CONTRAST    (Results Pending)     PROBLEM LIST:  Principal Problem:    Delirium  Active Problems:    Hyperosmolar hyperglycemic state (HHS) ([facility] [address]    New onset seizure ([facility] [address]    Altered mental status  Resolved Problems:    * No resolved hospital problems. *      ATTESTATION:  ICU Staff Physician note of personal involvement in Care  As the attending physician, I certify that I personally reviewed the patients history and personnally examined the patient to confirm the physical findings described above,  And that I reviewed the relevant imaging studies and available reports. I also discussed the differential diagnosis and all of the proposed management plans with the patient and individuals accompanying the patient to this visit. They had the opportunity to ask questions about the proposed management plans and to have those questions answered. This patient has a high probability of sudden, clinically significant deterioration, which requires the highest level of physician preparedness to intervene urgently. I managed/supervised life or organ supporting interventions that required frequent physician assessment. I devoted my full attention to the direct care of this patient for the amount of time indicated below. Time I spent with the family or surrogate(s) is included only if the patient was incapable of providing the necessary information or participating in medical decisions  Time devoted to teaching and to any procedures I billed separately is not included.      CRITICAL CARE TIME:  30 minutes    Alyssa Melgar MD  Pulmonary and Critical Care Medicine

## 2022-03-01 NOTE — CONSULTS
[address] [facility]  Consult Note    [address] [facility] [address]  [place], [address]  Phone [phone]   [phone]      Admit Date: 2022  9:48 AM  Pt Name: Gage Hagan  MRN: [id number]  : 1945  Reason for Consult:    Chief Complaint   Patient presents with    Fall    Seizures     Requesting Physician:  Colten Garvey DO  PCP: Jose Laguna MD  History Obtained From:  patient, chart   ID consulted for Infestation by bed bug [B88.8]  Seizure ([facility] [address] [R56.9]  Altered mental status [R41.82]  Altered mental status, unspecified altered mental status type [R41.82]  Hyperosmolar hyperglycemic state (HHS) ([address] [E11.00, E11.65]  on hospital day 1  CHIEF COMPLAINT       Chief Complaint   Patient presents with    Fall    Seizures     HISTORYOF PRESENT ILLNESS   Gage Hagan is a 78 y.o. male who presents with   has no past medical history on file. ED TRIAGEVITALS  BP: (!) 150/69, Temp: 98.1 °F (36.7 °C), Pulse: 83, Resp: 14, SpO2: 98 %  HPI  Pt was seen in ICU  lethargic arousable but does not fully answer questions  Pt came in from home due to screaming/unresposnive at home poss seizures  Bed bugs at home  incontinent of urine   Had elevated BS   Wbc7.1 cr0.7  UA blood rare bacteria   tox screen neg  CT head without contrast  showed no acute intracranial abnormality and small vessel ischemic disease. Mild mucosal thickening within the bilateral maxillary sinuses were noted as incidental findings.     CT cervical spine without contrast showed no acute fracture or subluxation. Degenerative changes noted. There are nonspecific lucent lesions within the C2 body and left lateral mass as well as sclerotic lesion within the C3 spinous process.   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 MRI pending   acyclovir (ZOVIRAX) 650 mg in dextrose 5 % 100 mL IVPB, Q8H  ampicillin 1000 mg ivpb mini bag, 6 times per day  vancomycin (VANCOCIN) 1,000 mg in dextrose 5 % 250 mL IVPB, Q12H  dexamethasone (DECADRON) injection 10 mg, Q6H  cefTRIAXone (ROCEPHIN) 2,000 mg in sterile water 20 mL IV syringe, Q12H         REVIEW OF SYSTEMS     CONSTITUTIONAL:   Poor historian   As in hpi   Medications Prior to Admission: aspirin 81 MG EC tablet, Take 1 tablet by mouth daily  alogliptin (NESINA) 12.5 MG TABS tablet, Take 1 tablet by mouth daily  insulin lispro (HUMALOG) 100 UNIT/ML injection vial, Inject 0-3 Units into the skin nightly  insulin lispro (HUMALOG) 100 UNIT/ML injection vial, Inject 0-6 Units into the skin 3 times daily (with meals)  metFORMIN (GLUCOPHAGE) 1000 MG tablet, Take 1 tablet by mouth 2 times daily (with meals)  atorvastatin (LIPITOR) 20 MG tablet, Take 1 tablet by mouth nightly  lisinopril (PRINIVIL;ZESTRIL) 10 MG tablet, Take 1 tablet by mouth daily  thiamine mononitrate (THIAMINE) 100 MG tablet, Take 1 tablet by mouth daily  CURRENT MEDICATIONS     Current Facility-Administered Medications:     acyclovir (ZOVIRAX) 650 mg in dextrose 5 % 100 mL IVPB, 10 mg/kg (Ideal), IntraVENous, Q8H, Ismail PEGGY Conklin DO, Stopped at 03/01/22 0913    ampicillin 1000 mg ivpb mini bag, 1,000 mg, IntraVENous, 6 times per day, Cally Rea DO, Stopped at 03/01/22 0913    vancomycin (VANCOCIN) 1,000 mg in dextrose 5 % 250 mL IVPB, 1,000 mg, IntraVENous, Q12H, Cally Rea DO, Stopped at 03/01/22 0345    dexamethasone (DECADRON) injection 10 mg, 10 mg, IntraVENous, Q6H, Seth Pereira APRN - CNP, 10 mg at 03/01/22 0842    insulin lispro (HUMALOG) injection vial 0-12 Units, 0-12 Units, SubCUTAneous, Q4H, Seth Periera APRN - CNP    insulin lispro (HUMALOG) injection vial 0-12 Units, 0-12 Units, SubCUTAneous, Q4H, Ismail U DO Rubin, 6 Units at 03/01/22 0840    cefTRIAXone (ROCEPHIN) 2,000 mg in sterile water 20 mL IV syringe, 2,000 mg, IntraVENous, Q12H, Dayan Adams MD    dexmedetomidine (PRECEDEX) 400 mcg in sodium chloride 0.9 % 100 mL infusion, 0.1-1.5 mcg/kg/hr, IntraVENous, Continuous, Dayan Adams MD    haloperidol lactate (HALDOL) injection 2 mg, 2 mg, IntraVENous, Q6H PRN, Dayan Adams MD    insulin glargine (LANTUS) injection vial 6 Units, 6 Units, SubCUTAneous, Daily, Dayan Adams MD    thiamine mononitrate tablet 100 mg, 100 mg, Oral, Daily, Loretoe Christiano, DO    atorvastatin (LIPITOR) tablet 20 mg, 20 mg, Oral, Nightly, Renantte Christiano, DO    aspirin EC tablet 81 mg, 81 mg, Oral, Daily, Raenette Christiano, DO    alogliptin (NESINA) tablet 12.5 mg, 12.5 mg, Oral, Daily, Lyn Camarenaar, DO    [Held by provider] metFORMIN (GLUCOPHAGE) tablet 1,000 mg, 1,000 mg, Oral, BID WC, Raenette Christiano, DO    acetaminophen (TYLENOL) suppository 650 mg, 650 mg, Rectal, Q4H PRN, Rasuhailtte Christiano, DO    acetaminophen (TYLENOL) tablet 500 mg, 500 mg, Oral, Q6H PRN, Raenette Christiano, DO    enoxaparin (LOVENOX) injection 40 mg, 40 mg, SubCUTAneous, Daily, Rasarahe Christiano, DO, 40 mg at 02/28/22 1716    polyethylene glycol (GLYCOLAX) packet 17 g, 17 g, Oral, Daily PRN, Renantte Christiano, DO    prochlorperazine (COMPAZINE) injection 10 mg, 10 mg, IntraVENous, Q6H PRN, Lyn Christiano, DO    0.9% NaCl with KCl 20 mEq infusion, , IntraVENous, Continuous, Airam Tripp DO, Last Rate: 150 mL/hr at 03/01/22 0851, New Bag at 03/01/22 0851    dextrose 5 % solution, 100 mL/hr, IntraVENous, PRN, Lyn Christiano, DO    [Held by provider] insulin lispro (HUMALOG) injection vial 0-12 Units, 0-12 Units, SubCUTAneous, TID WC, Lyn Alvarado DO    [Held by provider] insulin lispro (HUMALOG) injection vial 0-6 Units, 0-6 Units, SubCUTAneous, Nightly, Blake Hidalgo DO    levETIRAcetam (KEPPRA) 500 mg/100 mL IVPB, 500 mg, IntraVENous, BID, Lyn Alvarado DO, Stopped at 03/01/22 1113    dextrose 5 % and 0.45 % sodium chloride infusion, , IntraVENous, Continuous PRN, Lexus Cedeno,     [Held by provider] insulin regular (HUMULIN R;NOVOLIN R) 100 Units in sodium chloride 0.9 % 100 mL infusion, 0.5 Units/hr, IntraVENous, Continuous, Nolberto Roche, APRN - CNP, Stopped at 03/01/22 0657    glucose (GLUTOSE) 40 % oral gel 15 g, 15 g, Oral, PRN, Nolberto Roche, APRN - CNP    glucagon (rDNA) injection 1 mg, 1 mg, IntraMUSCular, PRN, Nolberto Roche, APRN - CNP    dextrose 5 % solution, 100 mL/hr, IntraVENous, PRN, Nolberto Roche, APRN - CNP    dextrose bolus (hypoglycemia) 10% 125 mL, 125 mL, IntraVENous, PRN **OR** dextrose bolus (hypoglycemia) 10% 250 mL, 250 mL, IntraVENous, PRN, Nolberto Roche, APRN - CNP  ALLERGIES     Patient has no allergy information on record. There is no immunization history on file for this patient. Internal Administration   First Dose      Second Dose           Last COVID Lab SARS-CoV-2, NAAT (no units)   Date Value   07/26/2021 Not Detected            PAST MEDICAL HISTORY   No past medical history on file. SURGICAL HISTORY     No past surgical history on file. FAMILY HISTORY     No family history on file.   SOCIAL HISTORY       Social History     Socioeconomic History    Marital status:      Spouse name: Not on file    Number of children: Not on file    Years of education: Not on file    Highest education level: Not on file   Occupational History    Not on file   Tobacco Use    Smoking status: Never Smoker    Smokeless tobacco: Never Used   Substance and Sexual Activity    Alcohol use: Not on file    Drug use: Not on file    Sexual activity: Not on file   Other Topics Concern    Not on file   Social History Narrative    Not on file     Social Determinants of Health     Financial Resource Strain:     Difficulty of Paying Living Expenses: Not on file   Food Insecurity:     Worried About [address] in the Last Year: Not on file    Ran Out of Food in the Last Year: Not on file   Transportation Needs:     Lack of Transportation (Medical): Not on file    Lack of Transportation (Non-Medical): Not on file   Physical Activity:     Days of Exercise per Week: Not on file    Minutes of Exercise per Session: Not on file   Stress:     Feeling of Stress : Not on file   Social Connections:     Frequency of Communication with Friends and Family: Not on file    Frequency of Social Gatherings with Friends and Family: Not on file    Attends Denominational Services: Not on file    Active Member of [address] [place] [address] or Organizations: Not on file    Attends Club or Organization Meetings: Not on file    Marital Status: Not on file   Intimate Partner Violence:     Fear of Current or Ex-Partner: Not on file    Emotionally Abused: Not on file    Physically Abused: Not on file    Sexually Abused: Not on file   Housing Stability:     Unable to Pay for Housing in the Last Year: Not on file    Number of [place] in the Last Year: Not on file    Unstable Housing in the Last Year: Not on file     ·  from home     [address]       ED Triage Vitals   BP Temp Temp Source Pulse Resp SpO2 Height Weight   02/28/22 1106 02/28/22 1012 02/28/22 1012 02/28/22 1106 02/28/22 1106 02/28/22 1106 -- 02/28/22 2300   (!) 134/123 98.3 °F (36.8 °C) Oral 102 25 94 %  151 lb 1.6 oz (68.5 kg)     Vitals:    Vitals:    03/01/22 0700 03/01/22 0800 03/01/22 0900 03/01/22 1000   BP: (!) 140/62 (!) 147/73 (!) 177/79 (!) 150/69   Pulse: 70 70 84 83   Resp: 15 16 19 14   Temp:       TempSrc:       SpO2: 95% 91% 92% 98%   Weight:         Physical Exam   Constitutional/General:  On o2 supine   Head: NC/AT  Eyes: PERRL, EOMI anicteric  Mouth: Normal mucosa, no thrush   Neck: Supple, full ROM,    Pulmonary: Lungs dec  to auscultation bilaterally. Not in respiratory distress on RA  Cardiovascular:  hr68 Regular rate and rhythm, no murmurs, gallops, or rubs.     Abdomen: Soft, + BS.   distension. Nontender. Extremities: Moves all extremities x 4. Warm and well perfused  Pulses:  Distal pulses intact  Skin: Warm and dry with rash all over  Neurologic:    arouses  Disoriented  [place]   piv  Restraints        DIAGNOSTIC RESULTS   RADIOLOGY:   CT HEAD WO CONTRAST    Result Date: 2/28/2022  EXAMINATION: CT OF THE HEAD WITHOUT CONTRAST  2/28/2022 10:19 am TECHNIQUE: CT of the head was performed without the administration of intravenous contrast. Dose modulation, iterative reconstruction, and/or weight based adjustment of the mA/kV was utilized to reduce the radiation dose to as low as reasonably achievable. COMPARISON: CT head 07/22/2021 HISTORY: ORDERING SYSTEM PROVIDED HISTORY: unresponsive, seizure activity concern for fall/bleed TECHNOLOGIST PROVIDED HISTORY: Reason for exam:->unresponsive, seizure activity concern for fall/bleed Has a \"code stroke\" or \"stroke alert\" been called? ->No Decision Support Exception - unselect if not a suspected or confirmed emergency medical condition->Emergency Medical Condition (MA) FINDINGS: There is patchy and confluent hypoattenuation within the white matter of the bilateral cerebral hemispheres. There is a focus of encephalomalacia within the high right frontal lobe. There is no evidence of mass, mass effect, or midline shift. The ventricles and sulci are of normal size and configuration for patient's age of 78 years. No extra-axial fluid collections or acute hemorrhage. The gray-white differentiation appears preserved without evidence of acute cortical ischemia. The calvarium is intact. There is mild mucosal thickening within the bilateral maxillary sinuses. The remaining visualized paranasal sinuses and mastoid air cells are clear. 1. No acute intracranial abnormality. 2. Chronic small vessel ischemic disease.      CT CERVICAL SPINE WO CONTRAST    Result Date: 2/28/2022  EXAMINATION: CT OF THE CERVICAL SPINE WITHOUT CONTRAST 2/28/2022 10:19 am TECHNIQUE: CT of the cervical spine was performed without the administration of intravenous contrast. Multiplanar reformatted images are provided for review. Dose modulation, iterative reconstruction, and/or weight based adjustment of the mA/kV was utilized to reduce the radiation dose to as low as reasonably achievable. COMPARISON: None. HISTORY: ORDERING SYSTEM PROVIDED HISTORY: fall TECHNOLOGIST PROVIDED HISTORY: Reason for exam:->fall Decision Support Exception - unselect if not a suspected or confirmed emergency medical condition->Emergency Medical Condition (MA) FINDINGS: There is no evidence of acute fracture. Vertebral alignment is anatomic. There are no compression deformities. The facet joints are intact at all levels with multilevel facet hypertrophy. There is a sclerotic focus within the C3 spinous process. There are vague lucent lesions within the C2 body and left lateral mass. The prevertebral soft tissue structures are unremarkable. There is central canal stenosis at C3-4. There is multilevel neural foraminal narrowing. There is arteriosclerosis. 1. No acute fracture or subluxation. 2. Degenerative changes in the cervical spine with central canal stenosis at C3-4 and multilevel neural foraminal narrowing. 3. There are nonspecific lucent lesions within the C2 body and left lateral mass as well as a sclerotic lesion within the C3 spinous process. These may represent benign findings. Consider MRI cervical spine or bone scan if the patient has history of malignancy. XR CHEST PORTABLE    Result Date: 2/28/2022  EXAMINATION: ONE XRAY VIEW OF THE CHEST 2/28/2022 10:23 am COMPARISON: July 22 21 HISTORY: ORDERING SYSTEM PROVIDED HISTORY: Possible Stroke TECHNOLOGIST PROVIDED HISTORY: Reason for exam:->Possible Stroke FINDINGS: Cardiac silhouette is mildly prominent. There are no pleural effusions. There is minimal linear opacity right lung base likely atelectasis.   Mild diffuse interstitial prominence also noted accentuated by lighter technique and more shallow inspiration compared to prior exam.     The cardiac silhouette and interstitial markings are somewhat prominent could be accentuated by more shallow inspiration lighter technique compared to prior exam.  However, consider upright PA and lateral views when the patient is able to ensure resolution. LABS  Recent Labs     02/28/22  1037 03/01/22 0447   WBC 7.1 13.8*   HGB 13.6 13.3   HCT 41.3 40.0   MCV 91.8 90.7    302     Recent Labs     02/28/22  1037 02/28/22  1037 02/28/22  1901 03/01/22 0447     --   --  138   K 4.0   < >  --  3.8   CL 95*   < >  --  104   CO2 27   < >  --  24   BUN 11  --   --  12   CREATININE 0.7  --   --  0.6*   GFRAA >60  --   --  >60   LABGLOM >60  --   --  >60   GLUCOSE 573*  --  334* 91   PROT 7.2  --   --  6.5   LABALBU 4.3  --   --  3.8   CALCIUM 9.5  --   --  9.0   BILITOT 0.4  --   --  0.3   ALKPHOS 263*  --   --  113   AST 16  --   --  39   ALT 16  --   --  16    < > = values in this interval not displayed.      Recent Labs     03/01/22 0447   PROCAL 0.07     Lab Results   Component Value Date    CRP 1.4 (H) 03/01/2022     Lab Results   Component Value Date    SEDRATE 21 (H) 03/01/2022     No results found for: [id number]  Lab Results   Component Value Date    COVID19 Not Detected 07/26/2021     COVID-19/NOY-COV2 LABS  Recent Labs     02/28/22  1037 03/01/22  0447 03/01/22  0830   CRP  --  1.4*  --    PROCAL  --  0.07  --    INR  --   --  1.0   PROTIME  --   --  11.5   AST 16 39  --    ALT 16 16  --    TRIG  --  104  --      Lab Results   Component Value Date    CHOL 205 03/01/2022    TRIG 104 03/01/2022    HDL 59 03/01/2022    LDLCALC 125 03/01/2022    LABVLDL 21 03/01/2022          MICROBIOLOGY:     FINAL IMPRESSION    Patient is a 78 y.o. male who presented with   Chief Complaint   Patient presents with    Fall    Seizures    and admitted for Infestation by bed bug

## 2022-03-01 NOTE — PROGRESS NOTES
CRITICAL CARE PROGRESS NOTE    I got call from [facility], the patient's son, I gave him a clinical update,  he gave consent for lumbar puncture and MRI. He is local and will be coming to the hospital to visit the patient soon.      Hi Johnson MD  Pulmonary and Critical Care Medicine

## 2022-03-01 NOTE — PROGRESS NOTES
Pt came to special procedures for lumbar puncture. Procedure was explained and questions were answered    Patients respirations easy, even and unlabored. Patient positioned, secured and prepped on table. 1525  Start procedure 149/71  63  25  100% on 2L by nasal canula  prone     1533   End procedure 133/67  67  24  100% on 2L by nasal canula prone      Patient tolerated procedure. Bandaid applied to mid back. Vital signs stable. Specimen taken to lab. Nurse to nurse called to ICU spoke with Thao Underwood RN     Pt transported to MRI.

## 2022-03-01 NOTE — PROGRESS NOTES
Pharmacy Consultation Note  (Antibiotic Dosing and Monitoring)    Initial consult date: 03/01/2022  Consulting physician/provider: Farooq Valdovinos  Drug: Vancomycin  Indication: Central Nervous System Infection     Age/  Gender Height Weight IBW  Allergy Information   76 y.o./male   151 lb 1.6 oz (68.5 kg)     Ideal body weight: 63.8 kg (140 lb 10.5 oz)  Adjusted ideal body weight: 65.7 kg (144 lb 13.3 oz)   Patient has no allergy information on record. Renal Function:  Recent Labs     02/28/22  1037   BUN 11   CREATININE 0.7       Intake/Output Summary (Last 24 hours) at 3/1/2022 0045  Last data filed at 2/28/2022 2317  Gross per 24 hour   Intake 665.36 ml   Output 1400 ml   Net -734.64 ml       Vancomycin Monitoring:  Trough:  No results for input(s): VANCOTROUGH in the last 72 hours. Random:  No results for input(s): VANCORANDOM in the last 72 hours. Vancomycin Administration Times:  Recent vancomycin administrations      No vancomycin IV orders with administrations found. Assessment:  · Patient is a 78 y.o. male who has been initiated on vancomycin  · Estimated Creatinine Clearance: 84 mL/min (based on SCr of 0.7 mg/dL).   · To dose vancomycin, pharmacy will be utilizing Gobbler calculation software for goal AUC/ARIANNA 400-600 mg/L-hr    Plan:  · Will check vancomycin levels when appropriate  · Will continue to monitor renal function   · Clinical pharmacy to follow      SRINATH Nuñez [facility] 3/1/2022 12:45 AM

## 2022-03-01 NOTE — PROGRESS NOTES
OT SESSION ATTEMPT     Date:3/1/2022  Patient Name: Shanita Dixon  MRN: [id number]  : 1945  Room: [facility]/[place] [address]     Attempted OT session this date:    [] unavailable due to other medical staff currently with pt   [x] unavailable per nursing staff recommendation-per agression and ativan provided     [] Unavailable per nursing staff secondary to lab / radiology results    [] pt declined due to ____. Benefits of participation in therapy reviewed with pt.    [] off unit   [] Other:     Will reattempt OT evaluation and/or treatment at a later time.     Lindsay Parker OTR/L; [id number]

## 2022-03-01 NOTE — CARE COORDINATION
3/1/2022 SS consult for Rehab at discharge. Referral to Josiah wheatley at [facility]. No PRECERT, PAYAM needs signed, HENS if to SNF. Pt from home alone, no assigned pcp. APS notification if pt discharges to home due to home conditions at apartment complex he is residing at. CM/SS following.  Electronically signed by Jossy Holcomb RN-BC on 3/1/2022 at 2:49 PM

## 2022-03-02 ENCOUNTER — APPOINTMENT (OUTPATIENT)
Dept: NEUROLOGY | Age: 77
DRG: 871 | End: 2022-03-02
Payer: MEDICARE

## 2022-03-02 LAB
ANION GAP SERPL CALCULATED.3IONS-SCNC: 11 MMOL/L (ref 7–16)
BASOPHILS ABSOLUTE: 0 E9/L (ref 0–0.2)
BASOPHILS RELATIVE PERCENT: 0 % (ref 0–2)
BUN BLDV-MCNC: 15 MG/DL (ref 6–23)
CALCIUM SERPL-MCNC: 8.6 MG/DL (ref 8.6–10.2)
CHLORIDE BLD-SCNC: 104 MMOL/L (ref 98–107)
CO2: 18 MMOL/L (ref 22–29)
CREAT SERPL-MCNC: 0.5 MG/DL (ref 0.7–1.2)
EOSINOPHILS ABSOLUTE: 0 E9/L (ref 0.05–0.5)
EOSINOPHILS RELATIVE PERCENT: 0 % (ref 0–6)
GFR AFRICAN AMERICAN: >60
GFR NON-AFRICAN AMERICAN: >60 ML/MIN/1.73
GLUCOSE BLD-MCNC: 190 MG/DL (ref 74–99)
HCT VFR BLD CALC: 40.8 % (ref 37–54)
HEMOGLOBIN: 13 G/DL (ref 12.5–16.5)
LYMPHOCYTES ABSOLUTE: 0.27 E9/L (ref 1.5–4)
LYMPHOCYTES RELATIVE PERCENT: 2 % (ref 20–42)
MAGNESIUM: 1.9 MG/DL (ref 1.6–2.6)
MCH RBC QN AUTO: 29.5 PG (ref 26–35)
MCHC RBC AUTO-ENTMCNC: 31.9 % (ref 32–34.5)
MCV RBC AUTO: 92.5 FL (ref 80–99.9)
METER GLUCOSE: 154 MG/DL (ref 74–99)
METER GLUCOSE: 156 MG/DL (ref 74–99)
METER GLUCOSE: 160 MG/DL (ref 74–99)
METER GLUCOSE: 162 MG/DL (ref 74–99)
METER GLUCOSE: 251 MG/DL (ref 74–99)
METER GLUCOSE: 326 MG/DL (ref 74–99)
MONOCYTES ABSOLUTE: 0.4 E9/L (ref 0.1–0.95)
MONOCYTES RELATIVE PERCENT: 3 % (ref 2–12)
NEUTROPHILS ABSOLUTE: 12.83 E9/L (ref 1.8–7.3)
NEUTROPHILS RELATIVE PERCENT: 95 % (ref 43–80)
PDW BLD-RTO: 13.6 FL (ref 11.5–15)
PHOSPHORUS: 3.5 MG/DL (ref 2.5–4.5)
PLATELET # BLD: 268 E9/L (ref 130–450)
PMV BLD AUTO: 8.5 FL (ref 7–12)
POTASSIUM SERPL-SCNC: 4.4 MMOL/L (ref 3.5–5)
RBC # BLD: 4.41 E12/L (ref 3.8–5.8)
REASON FOR REJECTION: NORMAL
REJECTED TEST: NORMAL
SODIUM BLD-SCNC: 133 MMOL/L (ref 132–146)
WBC # BLD: 13.5 E9/L (ref 4.5–11.5)

## 2022-03-02 PROCEDURE — 2580000003 HC RX 258: Performed by: INTERNAL MEDICINE

## 2022-03-02 PROCEDURE — 86695 HERPES SIMPLEX TYPE 1 TEST: CPT

## 2022-03-02 PROCEDURE — 86694 HERPES SIMPLEX NES ANTBDY: CPT

## 2022-03-02 PROCEDURE — 6360000002 HC RX W HCPCS: Performed by: INTERNAL MEDICINE

## 2022-03-02 PROCEDURE — 2000000000 HC ICU R&B

## 2022-03-02 PROCEDURE — 97161 PT EVAL LOW COMPLEX 20 MIN: CPT | Performed by: PHYSICAL THERAPIST

## 2022-03-02 PROCEDURE — 95816 EEG AWAKE AND DROWSY: CPT

## 2022-03-02 PROCEDURE — 97165 OT EVAL LOW COMPLEX 30 MIN: CPT | Performed by: OCCUPATIONAL THERAPIST

## 2022-03-02 PROCEDURE — 84100 ASSAY OF PHOSPHORUS: CPT

## 2022-03-02 PROCEDURE — 6360000002 HC RX W HCPCS: Performed by: NURSE PRACTITIONER

## 2022-03-02 PROCEDURE — 6370000000 HC RX 637 (ALT 250 FOR IP): Performed by: INTERNAL MEDICINE

## 2022-03-02 PROCEDURE — 82962 GLUCOSE BLOOD TEST: CPT

## 2022-03-02 PROCEDURE — 6370000000 HC RX 637 (ALT 250 FOR IP): Performed by: NURSE PRACTITIONER

## 2022-03-02 PROCEDURE — 97530 THERAPEUTIC ACTIVITIES: CPT | Performed by: PHYSICAL THERAPIST

## 2022-03-02 PROCEDURE — 80048 BASIC METABOLIC PNL TOTAL CA: CPT

## 2022-03-02 PROCEDURE — 83735 ASSAY OF MAGNESIUM: CPT

## 2022-03-02 PROCEDURE — 86703 HIV-1/HIV-2 1 RESULT ANTBDY: CPT

## 2022-03-02 PROCEDURE — 97530 THERAPEUTIC ACTIVITIES: CPT | Performed by: OCCUPATIONAL THERAPIST

## 2022-03-02 PROCEDURE — 97535 SELF CARE MNGMENT TRAINING: CPT | Performed by: OCCUPATIONAL THERAPIST

## 2022-03-02 PROCEDURE — 2700000000 HC OXYGEN THERAPY PER DAY

## 2022-03-02 PROCEDURE — 86696 HERPES SIMPLEX TYPE 2 TEST: CPT

## 2022-03-02 PROCEDURE — 87088 URINE BACTERIA CULTURE: CPT

## 2022-03-02 PROCEDURE — 85025 COMPLETE CBC W/AUTO DIFF WBC: CPT

## 2022-03-02 RX ORDER — ATORVASTATIN CALCIUM 40 MG/1
40 TABLET, FILM COATED ORAL NIGHTLY
Status: DISCONTINUED | OUTPATIENT
Start: 2022-03-02 | End: 2022-03-10 | Stop reason: HOSPADM

## 2022-03-02 RX ADMIN — SODIUM CHLORIDE AND POTASSIUM CHLORIDE: 9; 1.49 INJECTION, SOLUTION INTRAVENOUS at 20:56

## 2022-03-02 RX ADMIN — DEXAMETHASONE SODIUM PHOSPHATE 10 MG: 10 INJECTION INTRAMUSCULAR; INTRAVENOUS at 14:24

## 2022-03-02 RX ADMIN — SODIUM CHLORIDE, PRESERVATIVE FREE 10 ML: 5 INJECTION INTRAVENOUS at 20:46

## 2022-03-02 RX ADMIN — THIAMINE HYDROCHLORIDE 500 MG: 100 INJECTION, SOLUTION INTRAMUSCULAR; INTRAVENOUS at 21:05

## 2022-03-02 RX ADMIN — AMPICILLIN SODIUM 1000 MG: 1 INJECTION, POWDER, FOR SOLUTION INTRAMUSCULAR; INTRAVENOUS at 14:00

## 2022-03-02 RX ADMIN — SODIUM CHLORIDE AND POTASSIUM CHLORIDE: 9; 1.49 INJECTION, SOLUTION INTRAVENOUS at 00:53

## 2022-03-02 RX ADMIN — LEVETIRACETAM 500 MG: 5 INJECTION, SOLUTION INTRAVENOUS at 08:48

## 2022-03-02 RX ADMIN — INSULIN LISPRO 2 UNITS: 100 INJECTION, SOLUTION INTRAVENOUS; SUBCUTANEOUS at 01:44

## 2022-03-02 RX ADMIN — AMPICILLIN SODIUM 1000 MG: 1 INJECTION, POWDER, FOR SOLUTION INTRAMUSCULAR; INTRAVENOUS at 21:45

## 2022-03-02 RX ADMIN — AMPICILLIN SODIUM 1000 MG: 1 INJECTION, POWDER, FOR SOLUTION INTRAMUSCULAR; INTRAVENOUS at 17:41

## 2022-03-02 RX ADMIN — INSULIN LISPRO 6 UNITS: 100 INJECTION, SOLUTION INTRAVENOUS; SUBCUTANEOUS at 17:45

## 2022-03-02 RX ADMIN — ACYCLOVIR SODIUM 650 MG: 50 INJECTION, SOLUTION INTRAVENOUS at 17:30

## 2022-03-02 RX ADMIN — INSULIN LISPRO 2 UNITS: 100 INJECTION, SOLUTION INTRAVENOUS; SUBCUTANEOUS at 14:30

## 2022-03-02 RX ADMIN — ATORVASTATIN CALCIUM 40 MG: 40 TABLET, FILM COATED ORAL at 20:48

## 2022-03-02 RX ADMIN — SODIUM CHLORIDE AND POTASSIUM CHLORIDE: 9; 1.49 INJECTION, SOLUTION INTRAVENOUS at 14:21

## 2022-03-02 RX ADMIN — ACYCLOVIR SODIUM 650 MG: 50 INJECTION, SOLUTION INTRAVENOUS at 00:57

## 2022-03-02 RX ADMIN — DEXAMETHASONE SODIUM PHOSPHATE 10 MG: 10 INJECTION INTRAMUSCULAR; INTRAVENOUS at 08:39

## 2022-03-02 RX ADMIN — VANCOMYCIN HYDROCHLORIDE 1000 MG: 1 INJECTION, POWDER, LYOPHILIZED, FOR SOLUTION INTRAVENOUS at 00:54

## 2022-03-02 RX ADMIN — THIAMINE HYDROCHLORIDE 500 MG: 100 INJECTION, SOLUTION INTRAMUSCULAR; INTRAVENOUS at 06:07

## 2022-03-02 RX ADMIN — Medication 10 ML: at 08:43

## 2022-03-02 RX ADMIN — DEXAMETHASONE SODIUM PHOSPHATE 10 MG: 10 INJECTION INTRAMUSCULAR; INTRAVENOUS at 20:47

## 2022-03-02 RX ADMIN — DEXAMETHASONE SODIUM PHOSPHATE 10 MG: 10 INJECTION INTRAMUSCULAR; INTRAVENOUS at 03:07

## 2022-03-02 RX ADMIN — INSULIN LISPRO 2 UNITS: 100 INJECTION, SOLUTION INTRAVENOUS; SUBCUTANEOUS at 06:09

## 2022-03-02 RX ADMIN — ACYCLOVIR SODIUM 650 MG: 50 INJECTION, SOLUTION INTRAVENOUS at 08:44

## 2022-03-02 RX ADMIN — SODIUM CHLORIDE, PRESERVATIVE FREE 10 ML: 5 INJECTION INTRAVENOUS at 08:39

## 2022-03-02 RX ADMIN — SODIUM CHLORIDE AND POTASSIUM CHLORIDE: 9; 1.49 INJECTION, SOLUTION INTRAVENOUS at 07:35

## 2022-03-02 RX ADMIN — ASPIRIN 81 MG: 81 TABLET, COATED ORAL at 08:53

## 2022-03-02 RX ADMIN — ENOXAPARIN SODIUM 40 MG: 100 INJECTION SUBCUTANEOUS at 08:39

## 2022-03-02 RX ADMIN — AMPICILLIN SODIUM 1000 MG: 1 INJECTION, POWDER, FOR SOLUTION INTRAMUSCULAR; INTRAVENOUS at 05:29

## 2022-03-02 RX ADMIN — ALOGLIPTIN 12.5 MG: 12.5 TABLET, FILM COATED ORAL at 08:53

## 2022-03-02 RX ADMIN — INSULIN GLARGINE 6 UNITS: 100 INJECTION, SOLUTION SUBCUTANEOUS at 09:05

## 2022-03-02 RX ADMIN — WATER 2000 MG: 1 INJECTION INTRAMUSCULAR; INTRAVENOUS; SUBCUTANEOUS at 01:40

## 2022-03-02 RX ADMIN — INSULIN LISPRO 2 UNITS: 100 INJECTION, SOLUTION INTRAVENOUS; SUBCUTANEOUS at 09:05

## 2022-03-02 RX ADMIN — INSULIN LISPRO 8 UNITS: 100 INJECTION, SOLUTION INTRAVENOUS; SUBCUTANEOUS at 21:16

## 2022-03-02 RX ADMIN — THIAMINE HYDROCHLORIDE 500 MG: 100 INJECTION, SOLUTION INTRAMUSCULAR; INTRAVENOUS at 13:30

## 2022-03-02 RX ADMIN — AMPICILLIN SODIUM 1000 MG: 1 INJECTION, POWDER, FOR SOLUTION INTRAMUSCULAR; INTRAVENOUS at 00:58

## 2022-03-02 RX ADMIN — LEVETIRACETAM 500 MG: 5 INJECTION, SOLUTION INTRAVENOUS at 20:46

## 2022-03-02 RX ADMIN — AMPICILLIN SODIUM 1000 MG: 1 INJECTION, POWDER, FOR SOLUTION INTRAMUSCULAR; INTRAVENOUS at 08:46

## 2022-03-02 RX ADMIN — SODIUM CHLORIDE, PRESERVATIVE FREE 10 ML: 5 INJECTION INTRAVENOUS at 03:08

## 2022-03-02 RX ADMIN — WATER 2000 MG: 1 INJECTION INTRAMUSCULAR; INTRAVENOUS; SUBCUTANEOUS at 14:28

## 2022-03-02 ASSESSMENT — PAIN SCALES - GENERAL
PAINLEVEL_OUTOF10: 0

## 2022-03-02 NOTE — PLAN OF CARE
Problem: Non-Violent Restraints  Goal: Removal from restraints as soon as assessed to be safe  3/2/2022 1454 by Amelia Jarrell RN  Outcome: Completed  3/2/2022 0740 by Andra Sy RN  Outcome: Not Met This Shift  3/2/2022 0731 by Amelia Jarrell RN  Outcome: Not Met This Shift  Goal: No harm/injury to patient while restraints in use  3/2/2022 1454 by Amelia Jarrell RN  Outcome: Completed  3/2/2022 0740 by Andra Sy RN  Outcome: Met This Shift  3/2/2022 0731 by Amelia Jarrell RN  Outcome: Met This Shift  Goal: Patient's dignity will be maintained  3/2/2022 1454 by Amelia Jarrell RN  Outcome: Completed  3/2/2022 0740 by Andra Sy RN  Outcome: Met This Shift  3/2/2022 0731 by Amelia Jarrell RN  Outcome: Met This Shift

## 2022-03-02 NOTE — PLAN OF CARE
Problem: Inadequate oral food/beverage intake (NI-2.1)  Goal: Food and/or Nutrient Delivery  Description: ONS BID  Outcome: Met This Shift

## 2022-03-02 NOTE — PROGRESS NOTES
Physical Therapy  Physical Therapy Initial Evaluation/Plan of Care    Room #:  IC02/IC02-01  Patient Name: Bert Grey  YOB: 1945  MRN: [id number]    Date of Service: 3/2/2022     Tentative placement recommendation: Subacute rehab  Equipment recommendation: To be determined      Evaluating Physical Therapist: Gurpreet Almonte Physical Therapist      Specific Provider Orders/Date/Referring Provider : 02/28/22 1645   PT eval and treat Start: 02/28/22 1645, End: 02/28/22 1645, ONE TIME, Standing Count: 1 Occurrences, R    Mireya Garsia, DO      Admitting Diagnosis:   Infestation by bed bug [B88.8]  Seizure ([facility] [address] [R56.9]  Altered mental status [R41.82]  Altered mental status, unspecified altered mental status type [R41.82]  Hyperosmolar hyperglycemic state (HHS) ([address] [E11.00, E11.65]      Surgery: Lumbar puncture 3/1  Visit Diagnoses       Codes    Seizure ([address]     R56.9    Infestation by bed bug     B88.8          Patient Active Problem List   Diagnosis    Delirium    Hyperosmolar hyperglycemic state (HHS) ([address]    New onset seizure ([address]    Altered mental status        ASSESSMENT of Current Deficits Patient exhibits decreased strength, balance and endurance impairing functional mobility, transfers, gait  and gait distance. Patient is moderately confused, however he was pleasant during therapy. Patient requires consistent verbal and tactile cueing to perform functional mobility. Patient displayed posterior lean when sitting edge of bed that did improve as time progressed. Patient was able to maintain stable vitals throughout session without any oxygen. Increased levels of confusion, and general weakness are barriers to d/c and require skilled intervention during hospital stay to attain pre hospital level of function. Decreased strength, balance and endurance  increases patient's risk for fall.        PHYSICAL THERAPY  PLAN OF CARE       Physical therapy plan of care is established based on physician order,  patient diagnosis and clinical assessment    Current Treatment Recommendations:    -Bed Mobility: Lower extremity exercises  and Upper extremity exercises   -Sitting Balance: Incorporate reaching activities to activate trunk muscles  and Hands on support to maintain midline   -Standing Balance: Perform strengthening exercises in standing to promote motor control with or without upper extremity support  and Perform sit to stand activities maintaining FWB (full weight bearing) weightbearing Bilateral   -Transfers: Provide instruction on proper hand and foot position for adequate transfer of weight onto lower extremities and use of gait device if needed and Cues for hand placement, technique and safety. Provide stabilization to prevent fall   -Gait: Gait training, Standing activities to improve: base of support, weight shift, weight bearing  and Use of Assistive device for FWB (full weight bearing) weight bearing Bilateral     -Endurance: Utilize Supervised activities to increase level of endurance to allow for safe functional mobility including transfers and gait  and Use graduated activities to promote good breathing techniques and provide support and education to maximize respiratory function    PT long term treatment goals are located in below grid    Patient and or family understand(s) diagnosis, prognosis, and plan of care. Frequency of treatments: Patient will be seen  daily. Prior Level of Function: Patient poor historian unable to provide information about ambulation. Rehab Potential: good  for baseline    Past medical history:   Past Medical History:   Diagnosis Date    Type 2 diabetes mellitus with hyperglycemia, without long-term current use of insulin ([facility] [address]      History reviewed. No pertinent surgical history.     SUBJECTIVE:    Precautions: Continuous Pulse Oximetry , falls and seizures , brothers,  Social history: Patient lived at home alone in [facility] according to chart. Patient jennifer historian. Equipment owned: Patient jennifer historian    AM-PAC Basic Mobility       AM-[facility] Mobility Inpatient   How much difficulty turning over in bed?: A Lot  How much difficulty sitting down on / standing up from a chair with arms?: Unable  How much difficulty moving from lying on back to sitting on side of bed?: A Lot  How much help from another person moving to and from a bed to a chair?: Total  How much help from another person needed to walk in hospital room?: Total  How much help from another person for climbing 3-5 steps with a railing?: Total  AM-PAC Inpatient Mobility Raw Score : 8  AM-PAC Inpatient T-Scale Score : 28.52  Mobility Inpatient CMS 0-100% Score: 86.62  Mobility Inpatient CMS G-Code Modifier : CM    Nursing cleared patient for PT evaluation. The admitting diagnosis and active problem list as listed above have been reviewed prior to the initiation of this evaluation. OBJECTIVE;   Initial Evaluation  Date: 3/2/2022 Treatment Date:     Short Term/ Long Term   Goals   Was pt agreeable to Eval/treatment? Yes  To be met in 5 days   Pain level   0/10       Bed Mobility    Rolling: Maximal assist of 1    Supine to sit: Maximal assist of 1    Sit to supine: Maximal assist of 1    Scooting: Maximal assist of 1    Rolling: Minimal assist of 1    Supine to sit: Minimal assist of 1    Sit to supine: Minimal assist of 1    Scooting: Minimal assist of 1     Transfers Sit to stand: Moderate assist of  2   Sit to stand:  Moderate assist of 1    Ambulation    not assessed    25 feet using  wheeled walker with Moderate assist of 1    Stair negotiation: ascended and descended   Not assessed     Not assessed    ROM Within functional limits    Increase range of motion 10% of affected joints    Strength BUE:  refer to OT eval  RLE:  3+/5  LLE:  3+/5  Increase strength in affected mm groups by 1/3 grade   Balance Sitting EOB:  poor + posterior lean  Dynamic Standing:  poor Sitting EOB:  fair   Dynamic Standing: fair      Patient is Alert & Oriented x person and situation and follows one step directions    Sensation:  Patient  denies numbness/tingling   Edema:  no   Endurance: poor +    Vitals: room air   Blood Pressure at rest  Blood Pressure during session    Heart Rate at rest 65 Heart Rate during session 71`-79   SPO2 at rest 100%  SPO2 during session 96-99%     Patient education  Patient educated on role of Physical Therapy, risks of immobility, safety and plan of care, energy conservation,  importance of mobility while in hospital , purse lip breathing, ankle pumps, quad set and glut set for edema control, blood clot prevention and safety      Patient response to education:   Pt verbalized understanding Pt demonstrated skill Pt requires further education in this area   Yes Partial Yes      Treatment:  Patient practiced and was instructed/facilitated in the following treatment: Patient Sat edge of bed 10 minutes with Moderate assist of 1 to increase dynamic sitting balance and activity tolerance. Patient performed Sit to Stand transfer with cues for hand placement, safety, and reaching back before sitting. Therapeutic Exercises:  ankle pumps, quad sets, glut sets and hip abduction/adduction  x 15 reps. At end of session, patient in bed with nursing present    call light and phone within reach,  all lines and tubes intact, nursing notified. Patient would benefit from continued skilled Physical Therapy to improve functional independence and quality of life. Patient's/ family goals   none stated    Time in  1055  Time out  1126    Total Treatment Time  11 minutes    Evaluation time includes thorough review of current medical information, gathering information on past medical history/social history and prior level of function, completion of standardized testing/informal observation of tasks, assessment of data, and development of Plan of care and goals.      CPT codes:  Low Complexity PT evaluation (10321)  Therapeutic activities ()   11 minutes  1 unit(s)     Jennifer Amor, Student Physical Therapist

## 2022-03-02 NOTE — PLAN OF CARE
Problem: Falls - Risk of:  Goal: Will remain free from falls  Description: Will remain free from falls  Outcome: Met This Shift  Goal: Absence of physical injury  Description: Absence of physical injury  Outcome: Met This Shift     Problem: Skin Integrity:  Goal: Will show no infection signs and symptoms  Description: Will show no infection signs and symptoms  Outcome: Met This Shift  Goal: Absence of new skin breakdown  Description: Absence of new skin breakdown  Outcome: Met This Shift     Problem: Non-Violent Restraints  Goal: Removal from restraints as soon as assessed to be safe  Outcome: Not Met This Shift  Goal: No harm/injury to patient while restraints in use  Outcome: Met This Shift  Goal: Patient's dignity will be maintained  Outcome: Met This Shift

## 2022-03-02 NOTE — PROGRESS NOTES
CRITICAL CARE PROGRESS NOTE    The patient's case was discussed in multidisciplinary rounds including critical care specialist, nursing, RT and pharmacy. His evaluation is as follows:     78year old person with PMH of diabetes mellitus and as described below admitted to ICU for management of metabolic encephalopathy, seizures, alcohol withdrawal syndrome. Mr Gasper Simmonds is much more alert today, he tells me he feels better, minimal tremors for now, he is unable to tell me what happened to him, he states he remembers having a son Rick De Leon. He states he drinks 1-3 beers a day. 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  --ASA and statin     5) Chronic alcoholism with withdrawal syndrome and delirium tremens  --CIWA protocol with benzodiazepines  --On MVI and thiamine high dose for management of Wernicke encephalopathy      DVT prophylaxis with enoxaparin SQ  Nutrition: PO   GI prophylaxis with PPI  Physical therapy ordered  Goals of care:  Full code      BP (!) 116/55   Pulse 68   Temp 97.8 °F (36.6 °C) (Axillary)   Resp 16   Ht 5' 6\" (1.676 m)   Wt 151 lb 1.6 oz (68.5 kg)   SpO2 97%   BMI 24.39 kg/m²     General: Awake , oriented to person  HEENT: No head lesions, PERRL, EOMI, mouth without lesions, no nasal lesions, no cervical adenopathy palpated  Respiratory: Lungs with equal breath sounds bilaterally, no adventitious sounds auscultated, no accessory muscle use  CV: Regular rate, no murmurs, no JVD, no leg edema  Abdomen: Soft, non tender, + bowel sounds, no lesions  Skin: Hydrated, adequate turgor, no rash, capillary refill <2 seconds  Extremities: Muscular strength 4/5 in 4 limbs, moves 4 limbs spontaneously, distal pulses present  Neurology: Awake and alert, follows commands, moves 4 limbs on command and spontaneously, neck is supple, no meningitic signs present, with minimal tremors. Last 3 CMP:    Recent Labs     02/28/22  1037 02/28/22  1037 02/28/22  1901 03/01/22  0447 03/02/22  0510     --   --  138 133   K 4.0  --   --  3.8 4.4   CL 95*  --   --  104 104   CO2 27  --   --  24 18*   BUN 11  --   --  12 15   CREATININE 0.7  --   --  0.6* 0.5*   GLUCOSE 573*   < > 334* 91 190*   CALCIUM 9.5  --   --  9.0 8.6   PROT 7.2  --   --  6.5  --    LABALBU 4.3  --   --  3.8  --    BILITOT 0.4  --   --  0.3  --    ALKPHOS 263*  --   --  113  --    AST 16  --   --  39  --    ALT 16  --   --  16  --     < > = values in this interval not displayed. Recent Labs     03/02/22  0637   WBC 13.5*   RBC 4.41   HGB 13.0   HCT 40.8   MCV 92.5   MCH 29.5   MCHC 31.9*   RDW 13.6      MPV 8.5       No results for input(s): BC in the last 72 hours. No results for input(s): Noretta Toya in the last 72 hours.     24 HR INTAKE/OUTPUT:      Intake/Output Summary (Last 24 hours) at 3/2/2022 1042  Last data filed at 3/2/2022 1000  Gross per 24 hour   Intake 5174.63 ml   Output 2935 ml   Net 2239.63 ml     MEDICATIONS:   acyclovir  10 mg/kg (Ideal) IntraVENous Q8H    ampicillin IV  1,000 mg IntraVENous 6 times per day    dexamethasone  10 mg IntraVENous Q6H    insulin lispro  0-12 Units SubCUTAneous Q4H    cefTRIAXone (ROCEPHIN) IV  2,000 mg IntraVENous Q12H    insulin glargine  6 Units SubCUTAneous Daily    thiamine (VITAMIN B1) IVPB  500 mg IntraVENous Q8H    Followed by   Pancho Ozuna ON 3/4/2022] thiamine (VITAMIN B1) IVPB  100 mg IntraVENous Q8H    sodium chloride flush  5-40 mL IntraVENous 2 times per day    sodium chloride flush  5-40 mL IntraVENous 2 times per day    atorvastatin  20 mg Oral Nightly    aspirin  81 mg Oral Daily    alogliptin  12.5 mg Oral Daily    [Held by provider] metFORMIN  1,000 mg Oral BID WC    enoxaparin  40 mg SubCUTAneous Daily    [Held by provider] insulin lispro  0-12 Units SubCUTAneous TID WC    [Held by provider] insulin lispro  0-6 Units SubCUTAneous Nightly    levETIRAcetam  500 mg IntraVENous BID      sodium chloride      sodium chloride      0.9% NaCl with KCl 20 mEq 150 mL/hr at 03/02/22 0735    dextrose      dextrose 5 % and 0.45 % NaCl      [Held by provider] insulin Stopped (03/01/22 0657)    dextrose       haloperidol lactate, sodium chloride flush, sodium chloride, LORazepam, sodium chloride flush, sodium chloride, LORazepam **OR** LORazepam **OR** LORazepam **OR** LORazepam **OR** LORazepam **OR** LORazepam **OR** LORazepam **OR** LORazepam, acetaminophen, acetaminophen, polyethylene glycol, dextrose, dextrose 5 % and 0.45 % NaCl, glucose, glucagon (rDNA), dextrose, dextrose bolus (hypoglycemia) **OR** dextrose bolus (hypoglycemia)    OBJECTIVE:  Vitals:    03/02/22 1000   BP: (!) 116/55   Pulse: 68   Resp: 16   Temp:    SpO2: 97%        O2 Flow Rate (L/min): 2 L/min  O2 Device: Nasal cannula        LABS:  WBC   Date Value Ref Range Status   03/02/2022 13.5 (H) 4.5 - 11.5 E9/L Final   03/01/2022 13.8 (H) 4.5 - 11.5 E9/L Final   02/28/2022 7.1 4.5 - 11.5 E9/L Final     Hemoglobin   Date Value Ref Range Status   03/02/2022 13.0 12.5 - 16.5 g/dL Final   03/01/2022 13.3 12.5 - 16.5 g/dL Final   02/28/2022 13.6 12.5 - 16.5 g/dL Final     Hematocrit   Date Value Ref Range Status   03/02/2022 40.8 37.0 - 54.0 % Final   03/01/2022 40.0 37.0 - 54.0 % Final   02/28/2022 41.3 37.0 - 54.0 % Final     MCV   Date Value Ref Range Status   03/02/2022 92.5 80.0 - 99.9 fL Final   03/01/2022 90.7 80.0 - 99.9 fL Final   02/28/2022 91.8 80.0 - 99.9 fL Final     Platelets   Date Value Ref Range Status   03/02/2022 bony destructive lesion is seen. 2. Specifically, no pathologic lesions are identified in the C2 or C3   vertebral bodies to correspond with the findings of the prior CT. 3. Severe central canal stenosis at C3-4. Moderate stenosis at C5-6.   4.  Multilevel neural foraminal stenoses, worst (severe) at the right C4-5,   bilateral C5-6 and right C7-T1 levels. RECOMMENDATIONS:   Unavailable         MRI BRAIN W WO CONTRAST   Final Result   1. No acute intracranial abnormality. .   2.  No mass, mass effect, edema or hemorrhage is seen. 3. Small chronic right frontal lobe infarction. Multiple chronic lacunar   infarctions, as described. RECOMMENDATIONS:   Unavailable         IR LUMBAR PUNCTURE FOR DIAGNOSIS   Final Result   Successful fluoroscopic-guided lumbar puncture. CT HEAD WO CONTRAST   Final Result   1. No acute intracranial abnormality. 2. Chronic small vessel ischemic disease. CT CERVICAL SPINE WO CONTRAST   Final Result   1. No acute fracture or subluxation. 2. Degenerative changes in the cervical spine with central canal stenosis at   C3-4 and multilevel neural foraminal narrowing. 3. There are nonspecific lucent lesions within the C2 body and left lateral   mass as well as a sclerotic lesion within the C3 spinous process. These may   represent benign findings. Consider MRI cervical spine or bone scan if the   patient has history of malignancy. XR CHEST PORTABLE   Final Result   The cardiac silhouette and interstitial markings are somewhat prominent could   be accentuated by more shallow inspiration lighter technique compared to   prior exam.  However, consider upright PA and lateral views when the patient   is able to ensure resolution. PROBLEM LIST:  Principal Problem:    Delirium  Active Problems:    Hyperosmolar hyperglycemic state (HHS) ([address]    New onset seizure ([address]    Altered mental status  Resolved Problems:    * No resolved hospital problems. *      ATTESTATION:  ICU Staff Physician note of personal involvement in Care  As the attending physician, I certify that I personally reviewed the patients history and personnally examined the patient to confirm the physical findings described above,  And that I reviewed the relevant imaging studies and available reports. I also discussed the differential diagnosis and all of the proposed management plans with the patient and individuals accompanying the patient to this visit. They had the opportunity to ask questions about the proposed management plans and to have those questions answered. This patient has a high probability of sudden, clinically significant deterioration, which requires the highest level of physician preparedness to intervene urgently. I managed/supervised life or organ supporting interventions that required frequent physician assessment. I devoted my full attention to the direct care of this patient for the amount of time indicated below. Time I spent with the family or surrogate(s) is included only if the patient was incapable of providing the necessary information or participating in medical decisions  Time devoted to teaching and to any procedures I billed separately is not included.      CRITICAL CARE TIME:  30 minutes    Bhavna Short MD  Pulmonary and Critical Care Medicine

## 2022-03-02 NOTE — PROGRESS NOTES
Comprehensive Nutrition Assessment    Type and Reason for Visit:  Initial,RD Nutrition Re-Screen/LOS    Nutrition Recommendations/Plan: Start Glucerna BID    Nutrition Assessment:  Pt admits w/ Altered mental status, hyperglycemia, Seizure activity and bed bugs, No noted PMH available. Diet inititated, will start ONS BID and monitor oral intakes and monitor    Malnutrition Assessment:  Malnutrition Status: At risk for malnutrition (Comment)    Context:  Acute Illness     Findings of the 6 clinical characteristics of malnutrition:  Energy Intake:  7 - 50% or less of estimated energy requirements for 5 or more days  Weight Loss:  Unable to assess     Body Fat Loss:  No significant body fat loss     Muscle Mass Loss:  No significant muscle mass loss    Fluid Accumulation:  No significant fluid accumulation     Strength:  Not Performed    Estimated Daily Nutrient Needs:  Energy (kcal):  ; Weight Used for Energy Requirements:  Current     Protein (g):   (x1.3-1.5gm/kg); Weight Used for Protein Requirements:  Current        Fluid (ml/day):  ; Method Used for Fluid Requirements:  1 ml/kcal      Nutrition Related Findings:  A/ox1, abd WDL, +BS, no edema, +I/O +1.8L, Stebbins, A1C(H), BGL elevated      Wounds:   (bed bug bites)       Current Nutrition Therapies:    ADULT DIET; Regular; 5 carb choices (75 gm/meal);  Low Fat/Low Chol/High Fiber/DARRICK  ADULT ORAL NUTRITION SUPPLEMENT; Lunch, Dinner; Diabetic Oral Supplement    Anthropometric Measures:  · Height: 5' 6\" (167.6 cm)  · Current Body Weight: 151 lb (68.5 kg) (3/2 bed)       · Usual Body Weight:  (no wt hx)     · Ideal Body Weight: 142 lbs; % Ideal Body Weight 106.3 %   · BMI: 24.4  · BMI Categories: Normal Weight (BMI 22.0 to 24.9) age over 72       Nutrition Diagnosis:   · Inadequate oral intake related to endocrine dysfuntion as evidenced by poor intake prior to admission,lab values      Nutrition Interventions:   Food and/or Nutrient Delivery:

## 2022-03-02 NOTE — CARE COORDINATION
3/2/2022 ICU, 2lnc, LP- unremarkable- neuro following. SS consult for Rehab at discharge. [facility] to accept. Referral to [place] [facility] a message, alternate [facility] and rehab- then [facility] (n) (r).  No PRECERT, PAYAM needs signed, HENS if to SNF. Pt from home alone, no assigned pcp. APS notification if pt discharges to home due to home conditions at apartment complex he is residing at- bugs and mold. CM/SS following. Family aware of pts rent being due.  Electronically signed by Adam Leon RN-BC on 3/2/2022 at 11:24 AM

## 2022-03-02 NOTE — PLAN OF CARE
Problem: Falls - Risk of:  Goal: Will remain free from falls  Description: Will remain free from falls  3/2/2022 0740 by Jess Campo RN  Outcome: Met This Shift     Problem: Falls - Risk of:  Goal: Absence of physical injury  Description: Absence of physical injury  3/2/2022 0740 by Jess Campo RN  Outcome: Met This Shift     Problem: Skin Integrity:  Goal: Will show no infection signs and symptoms  Description: Will show no infection signs and symptoms  3/2/2022 0740 by Jess Campo RN  Outcome: Met This Shift     Problem: Skin Integrity:  Goal: Absence of new skin breakdown  Description: Absence of new skin breakdown  3/2/2022 0740 by Jess Campo RN  Outcome: Met This Shift     Problem: Non-Violent Restraints  Goal: No harm/injury to patient while restraints in use  3/2/2022 0740 by Jess Campo RN  Outcome: Met This Shift     Problem: Non-Violent Restraints  Goal: Patient's dignity will be maintained  3/2/2022 0740 by Jess Campo RN  Outcome: Met This Shift     Problem: Non-Violent Restraints  Goal: Removal from restraints as soon as assessed to be safe  3/2/2022 0740 by Jess Campo RN  Outcome: Not Met This Shift

## 2022-03-02 NOTE — CARE COORDINATION
3/2/2022 SS consult for Rehab at discharge. [address] [facility]- unable to accept. Referral to [facility] and rehab then can try [facility] (n) (r). No PRECERT, PAYAM needs signed, HENS if to SNF. Pt from home alone, no assigned pcp. APS notification if pt discharges to home due to home conditions at apartment complex he is residing at- bugs and mold. CM/SS following. Family aware of pts rent being due.   Electronically signed by Lori Rosa RN-BC on 3/2/2022 at 3:12 PM

## 2022-03-02 NOTE — PROGRESS NOTES
[address] [facility]  [facility]         Date:3/2/2022                                                   Patient Name: Riki Bishop     MRN: [id number]     : 1945     Room: [place] [address]       Evaluating OT: Dank Brown OTR/L; [id number]       Referring Provider and Orders/Date:   OT eval and treat Start: 22, End: 22, ONE TIME, Standing Count: 1 Occurrences, R    Christen Cam DO     Diagnosis:   1. Hyperosmolar hyperglycemic state (HHS) ([facility] [address]    2. Seizure ([facility] [address]    3. Infestation by bed bug    4. Altered mental status, unspecified altered mental status type         Surgery: 3/2/22 lumbar puncture      Pertinent Medical History:        Past Medical History:   Diagnosis Date    Type 2 diabetes mellitus with hyperglycemia, without long-term current use of insulin ([facility] [address]         History reviewed. No pertinent surgical history.     Precautions:  Fall Risk, seizure, graham wrist restraints    Recommended placement: subacute    Assessment of current deficits     [x] Functional mobility  [x]ADLs  [x] Strength               [x]Cognition     [x] Functional transfers   [x] IADLs         [x] Safety Awareness   [x]Endurance     [] Fine Coordination              [x] Balance      [] Vision/perception   []Sensation      [x]Gross Motor Coordination  [] ROM  [] Delirium                   [] Motor Control     OT PLAN OF CARE   OT POC based on physician orders, patient diagnosis and results of clinical assessment    Frequency/Duration 1-3 days/wk for 2 weeks PRN   Specific OT Treatment Interventions to include:   * Instruction/training on adapted ADL techniques and AE recommendations to increase functional independence within precautions       * Training on energy conservation strategies, correct breathing pattern and techniques to improve independence/tolerance for self-care routine  * Functional transfer/mobility training/DME recommendations for increased independence, safety, and fall prevention  * Patient/Family education to increase follow through with safety techniques and functional independence  * Recommendation of environmental modifications for increased safety with functional transfers/mobility and ADLs  * Cognitive retraining/development of therapeutic activities to improve problem solving, judgement, memory, and attention for increased safety/participation in ADL/IADL tasks  * Therapeutic exercise to improve motor endurance, ROM, and functional strength for ADLs/functional transfers  * Therapeutic activities to facilitate/challenge dynamic balance, stand tolerance for increased safety and independence with ADLs  * Therapeutic activities to facilitate gross/fine motor skills for increased independence with ADLs  * Neuro-muscular re-education: facilitation of righting/equilibrium reactions, midline orientation, scapular stability/mobility, normalization of muscle tone, and facilitation of volitional active controled movement  * Positioning to improve skin integrity, interaction with environment and functional independence     Recommended Adaptive Equipment/DME: TBD      Home Living: alone; apartment, resides on 8th floor, No steps to enter, elevator access. He reports only min social support PTA.    Bathroom set-up: Walk-in shower with grab bars          Equipment owned: cane and shower chair      Prior Level of Function: Independent with ADLs , Independent with IADLs; ambulated with cane    Driving: no-walks or gets rides   Occupation: retired but can not remember where he worked   Enjoys: fishing, sports fan, relaxation    Pain Level: none  Cognition: A&O: 1/4 to self only-could not ID building as a hospital and did not know what city or state he lived in; Evgeny 2 step directions   Memory:  Poor+   Sequencing:  Fair-   Problem solving:  Poor+   Judgement/safety:  Poor+    AM-PAC Daily Activity Inpatient   How much help for putting on and taking off regular lower body clothing?: Total  How much help for Bathing?: A Lot  How much help for Toileting?: Total  How much help for putting on and taking off regular upper body clothing?: Total  How much help for taking care of personal grooming?: A Lot  How much help for eating meals?: A Little  AM-PAC Inpatient Daily Activity Raw Score: 10  AM-PAC Inpatient ADL T-Scale Score : 27.31  ADL Inpatient CMS 0-100% Score: 74.7  ADL Inpatient CMS G-Code Modifier : CL     Functional Assessment:     Initial Eval Status  Date: 3/2/2022   Treatment Status  Date: STGs = LTGs  Time frame: 10-14 days   Feeding Minimal Assist : Extended time, repositioning for safety/function and education on body mechanics with breakfast from upright sitting in supine. Pt required set up of food items and prep of items on tray. All items placed within reach for increased indep and decreased spillage. Difficult with restraints in place. Pt was able to use utensils and used right UE as dominate hand with self feeding. Good intake and 25% spillage overall. No coughing for choking hazard was present. Indep   Grooming Moderate Assist with oral care, hand wash and face wash  Stand by Assist    UB Dressing Dependent with gown management from supine level  Moderate Assist    LB Dressing Dependent for donning socks from supine level. Moderate Assist    Bathing Maximal Assist from supine level due to confusion  Minimal Assist    Toileting Dependent with brothers management  Maximal Assist    Bed Mobility  Supine to sit: Maximal Assist   Sit to supine: Maximal Assist  With confusion and trunk extension   Supine to sit: Min Assist   Sit to supine: Minimal Assist    Functional Transfers  NT due to pt overall debility, decreased activity tolerance, balance deficits, safety and fall risk.      Moderate Assist    Functional Mobility  NT due to pt overall debility, decreased activity tolerance, balance deficits, safety and fall risk. Moderate Assist    Balance Sitting:     Static:  poor    Dynamic:poor  Standing: NT  Sitting:     Static:  fair    Dynamic:fair-  Standing: poor+   Activity Tolerance Vitals with activity:room air 97% 151/105 Hr 84. Following ADLs 91% 150/66 HR 85  Sitting EOB for <5min due to confusion and impulsivity. Returned to supine  Increase sitting tolerance for >10min with stable vital signs for carry over into toileting, functional tranfers and indep in ADLs   Visual/  Perceptual Glasses: not Present; WFL    Reports change in vision since admission: No     NA   Ricardo UE Strengthening  4/5 generally  5/5MMT generally for carry over into self care, functional transfers and functional mobility with AD. Hand Dominance  [x] Right  [] Left    AROM (PROM) Strength Additional Info:    RUE  WFL 4/5 good  and  FMC/dexterity noted during ADL tasks  Opposition [x] Intact [] Impaired  Finger to nose [] Intact [x] Impaired     LUE WFL 4/5 good  and FMC/dexterity noted during ADL tasks  Opposition [x] Intact [] Impaired  Finger to nose [] Intact [x] Impaired     Hearing: WFL   Sensation:  No c/o numbness or tingling   Tone: WFL   Edema: L UE    Comments: Upon arrival patient supine in bed with lunch. Pt required dep A for most UB ADLs and dep A LB ADLs tasks. The biggest barriers reflect that of functional transfers, functional mobility, UB/LB ADLs, cognition, activity tolerance, balance, safety and strengthening. At end of session, patient supine with call light and phone within reach, all lines and tubes intact. Overall patient demonstrated decreased independence and safety during completion of ADL/functional transfer/mobility tasks compared to PLOF. Nursing updated on pt position and status following OT eval. Pt would benefit from continued skilled OT to increase safety and independence with completion of ADL/IADL tasks for functional independence and quality of life.     Treatment: OT treatment provided this date includes:   Instruction, education and training on safe facilitation and adapted techniques for completion of ADLs. These include neuromuscular reeducation to facilitate balance/righting reactions, proper positioning/alignment to improve interaction with environment and overall function and on adapted techniques/work simplification for completion of ADLs. Education provided on hand/feet placement with bed rails and body mechanics for fall prevention. Cues for energy conservation and safety for in the home at DC, including modifications and DME. Extended time to complete all tasks, including skilled monitoring of patient's response during treatment session and vital signs. Prior to and at the end of session, environmental modifications / line management completed for patients safety and efficiency of treatment session. See above for further details. Rehab Potential: Fair+ for established goals     Patient / Family Goal: Increase standing and strength    Patient and/or family were instructed on functional diagnosis, prognosis/goals and OT plan of care. Demonstrated fair- understanding. Eval Complexity: Low  · History: Brief review of medical records and additional review of physical, cognitive, or psychosocial history related to current functional performance  · Exam: 3+ performance deficits  · Assistance/Modification: Mod assistance or modifications required to perform tasks. May have comorbidities that affect occupational performance.     Time In: 1335  Time Out: 1420  Total Treatment Time: 25    Min Units   OT Eval Low 97165  x  1   OT Eval Medium 86285      OT Eval High 25480      OT Re-Eval [id number]       Therapeutic Ex 76585       Therapeutic Activities 80570  12 1    ADL/Self Care 51000  13 1    Orthotic Management 84505       Manual 23590     Neuro Re-Ed 97503       Non-Billable Time          Evaluation Time additionally includes thorough review of current medical information, gathering information on past medical history/social history and prior level of function, interpretation of standardized testing/informal observation of tasks, assessment of data and development of plan of care and goals.             Dickson Cockayne OTR/L; [id number]

## 2022-03-02 NOTE — PROGRESS NOTES
[address] [facility]  [facility]  Progress Note    NAME: Tosha Tse  MR:  [id number]  :   1945  DATE OF SERVICE:22    This is a face to face encounter with Tosha Tse 78 y.o. male on 22    CHIEF COMPLAINT     ID following for   Chief Complaint   Patient presents with    Fall    Seizures     HISTORY OF PRESENT ILLNESS   Pt seen and examined  22  In ICU in bed on o2 2L afebrile   arousable not oriented  Wbc13.5  S/p Lp     Patient is tolerating medications. No reported adverse drug reactions. REVIEW OF SYSTEMS     As stated above in the chief complaint, otherwise negative.   CURRENT MEDICATIONS      acyclovir  10 mg/kg (Ideal) IntraVENous Q8H    ampicillin IV  1,000 mg IntraVENous 6 times per day    vancomycin  1,000 mg IntraVENous Q12H    dexamethasone  10 mg IntraVENous Q6H    insulin lispro  0-12 Units SubCUTAneous Q4H    cefTRIAXone (ROCEPHIN) IV  2,000 mg IntraVENous Q12H    insulin glargine  6 Units SubCUTAneous Daily    thiamine (VITAMIN B1) IVPB  500 mg IntraVENous Q8H    Followed by   Tyrone Lopez ON 3/4/2022] thiamine (VITAMIN B1) IVPB  100 mg IntraVENous Q8H    sodium chloride flush  5-40 mL IntraVENous 2 times per day    sodium chloride flush  5-40 mL IntraVENous 2 times per day    atorvastatin  20 mg Oral Nightly    aspirin  81 mg Oral Daily    alogliptin  12.5 mg Oral Daily    [Held by provider] metFORMIN  1,000 mg Oral BID WC    enoxaparin  40 mg SubCUTAneous Daily    [Held by provider] insulin lispro  0-12 Units SubCUTAneous TID WC    [Held by provider] insulin lispro  0-6 Units SubCUTAneous Nightly    levETIRAcetam  500 mg IntraVENous BID     Continuous Infusions:   sodium chloride      sodium chloride      0.9% NaCl with KCl 20 mEq 150 mL/hr at 22 0735    dextrose      dextrose 5 % and 0.45 % NaCl      [Held by provider] insulin Stopped (22 0657)    dextrose       PRN Meds:haloperidol lactate, sodium chloride flush, sodium chloride, LORazepam, sodium chloride flush, sodium chloride, LORazepam **OR** LORazepam **OR** LORazepam **OR** LORazepam **OR** LORazepam **OR** LORazepam **OR** LORazepam **OR** LORazepam, acetaminophen, acetaminophen, polyethylene glycol, dextrose, dextrose 5 % and 0.45 % NaCl, glucose, glucagon (rDNA), dextrose, dextrose bolus (hypoglycemia) **OR** dextrose bolus (hypoglycemia)    PHYSICAL EXAM     /70   Pulse 65   Temp 97.4 °F (36.3 °C) (Axillary)   Resp 17   Wt 151 lb 1.6 oz (68.5 kg)   SpO2 97%   BMI 24.39 kg/m²   Temp  Av.7 °F (35.9 °C)  Min: 96.2 °F (35.7 °C)  Max: 97.4 °F (36.3 °C)  Constitutional:  The patient is arousable  Skin:      rashes were noted. No chnage  HEENT:   Round and reactive pupils. AT/NC  Neck:    Supple to movements. Chest:   No use of accessory muscles to breathe. Symmetrical expansion. Cardiovascular:  S1 and S2 are rhythmic and regular. No murmurs appreciated. Abdomen:   Positive bowel sounds to auscultation. Benign to palpation. distended  Extremities:   No clubbing, no cyanosis, no edema.   CNS    Awake   Lines: piv      DIAGNOSTIC RESULTS   Radiology:    Recent Labs     22  1037 22  0447 22  0637   WBC 7.1 13.8* 13.5*   RBC 4.50 4.41 4.41   HGB 13.6 13.3 13.0   HCT 41.3 40.0 40.8   MCV 91.8 90.7 92.5   MCH 30.2 30.2 29.5   MCHC 32.9 33.3 31.9*   RDW 13.4 13.4 13.6    302 268   MPV 8.7 8.6 8.5     Recent Labs     22  1037 22  1037 22  1901 22  0447 22  0510     --   --  138 133   K 4.0  --   --  3.8 4.4   CL 95*  --   --  104 104   CO2 27  --   --  24 18*   BUN 11  --   --  12 15   CREATININE 0.7  --   --  0.6* 0.5*   GLUCOSE 573*   < > 334* 91 190*   PROT 7.2  --   --  6.5  --    LABALBU 4.3  --   --  3.8  --    CALCIUM 9.5  --   --  9.0 8.6   BILITOT 0.4  --   --  0.3  --    ALKPHOS 263*  --   --  113  --    AST 16  --   --  39  --    ALT 16  --   --  16  --     < > = values in this interval not displayed. Lab Results   Component Value Date    CRP 1.4 (H) 03/01/2022     Lab Results   Component Value Date    SEDRATE 21 (H) 03/01/2022     Recent Labs     02/28/22  1037 03/01/22  0447 03/01/22  0830   CRP  --  1.4*  --    PROCAL  --  0.07  --    INR  --   --  1.0   PROTIME  --   --  11.5   AST 16 39  --    ALT 16 16  --    TRIG  --  104  --      Lab Results   Component Value Date    CHOL 205 03/01/2022    TRIG 104 03/01/2022    HDL 59 03/01/2022    LDLCALC 125 03/01/2022    LABVLDL 21 03/01/2022        Microbiology:         FINAL IMPRESSION    Pt had   Chief Complaint   Patient presents with    Fall    Seizures    Admitted for Infestation by bed bug [B88.8]  Seizure ([facility] [address] [R56.9]  Altered mental status [R41.82]  Altered mental status, unspecified altered mental status type [R41.82]  Hyperosmolar hyperglycemic state (HHS) ([facility] [address] [E11.00, E11.65]  On treatment for   lwukocytosis  eval for CNS infection   Has Small chronic right frontal lobe infarction.  Multiple chronic lacunar   Infarctions    acyclovir (ZOVIRAX) 650 mg in dextrose 5 % 100 mL IVPB, Q8H  ampicillin 1000 mg ivpb mini bag, 6 times per day  vancomycin (VANCOCIN) 1,000 mg in dextrose 5 % 250 mL IVPB, Q12H will stop   dexamethasone (DECADRON) injection 10 mg, Q6H  cefTRIAXone (ROCEPHIN) 2,000 mg in sterile water 20 mL IV syringe, Q12H           · Monitor labs    Imaging and labs were reviewed per medical records. Thank you for involving me in the care of Eugene Mejias will continue to follow. Please do not hesitate to call for any questions or concerns.     Electronically signed by Jose Manuel Marquez MD on 3/2/2022 at 8:14 AM

## 2022-03-02 NOTE — PROGRESS NOTES
Department of Internal Medicine        CHIEF COMPLAINT: Altered mental status, seizures    Reason for Admission: Altered mental status, seizure, hypoglycemia    HISTORY OF PRESENT ILLNESS:      The patient is a 78 y.o. male who presents with being brought in by paramedics. Apparently patient's neighbors called EMS when he heard the patient screaming and when they found him he was unresponsive. Paramedics came to check blood sugar which was reading high. There is no evidence of any trauma at the scene. Patient was incontinent of urine. The patient had tonic-clonic seizure in the ED and was treated with Ativan and Keppra. CT the head did not show any acute intracranial abnormality. Patient had a random blood sugar of 573 on admission. Liver enzymes essentially normal with a negative drug screen and a WBC 7.1 hemoglobin 13.6.    3/1/2022  Patient seen examined on ICU. The patient is much more alert and oriented currently than he was yesterday. Patient is oriented to person. Patient still weak with very poor memory. He denies any chest or abdominal pain. He denies any headaches or blurred vision. BUN/creatinine 12/0.6. Blood sugars ranging 200s. Hemoglobin A1c was 12.3. WBC 13.8 with hemoglobin 13.3. Sed rate is 21. Temperature 98.1 with heart rate 83 and blood pressure 150/69. O2 sat 98% on room air at rest.  Urine output is very good. Case discussed with Dr. Virgie De Leon today. With this patient's altered mental status even though it has improved we are attempting to have IR to do a lumbar puncture. I think with the patient's presentation it would be a very good idea to have this done. 3/2/2022  Patient seen examined on ICU. Patient is a little bit more alert and oriented again today. Patient still very very weak. Patient still has somewhat of a flat affect. Patient does deny any type of chest, abdominal pain. Denies any significant unilateral weakness.   MRI of the brain showed just old infarcts. The lumbar puncture though did show an increase in protein. BUN/creatinine 15/0.5. Blood sugars ranging 141190. WBC 13.5 hemoglobin 13. Temperature is 97.8 with heart rate is 68 blood pressure 160/55. O2 sat 97% on 2 L nasal cannula. Urine output is good. Past Medical History:    Past Medical History:   Diagnosis Date    Type 2 diabetes mellitus with hyperglycemia, without long-term current use of insulin ([facility] [address]      Past Surgical History:    History reviewed. No pertinent surgical history. Medications Prior to Admission:    @  Prior to Admission medications    Medication Sig Start Date End Date Taking? Authorizing Provider   aspirin 81 MG EC tablet Take 1 tablet by mouth daily 7/27/21   Ora Art, DO   alogliptin (NESINA) 12.5 MG TABS tablet Take 1 tablet by mouth daily 7/27/21   Ora Art, DO   insulin lispro (HUMALOG) 100 UNIT/ML injection vial Inject 0-3 Units into the skin nightly 7/26/21   Ora Art, DO   insulin lispro (HUMALOG) 100 UNIT/ML injection vial Inject 0-6 Units into the skin 3 times daily (with meals) 7/26/21   Ora Art, DO   metFORMIN (GLUCOPHAGE) 1000 MG tablet Take 1 tablet by mouth 2 times daily (with meals) 7/26/21   Ora Art, DO   atorvastatin (LIPITOR) 20 MG tablet Take 1 tablet by mouth nightly 7/26/21   Ora Art, DO   lisinopril (PRINIVIL;ZESTRIL) 10 MG tablet Take 1 tablet by mouth daily 7/27/21   Ora Art, DO   thiamine mononitrate (THIAMINE) 100 MG tablet Take 1 tablet by mouth daily 7/27/21   Ora Art, DO       Allergies:  Patient has no known allergies.     Social History:   Social History     Socioeconomic History    Marital status:      Spouse name: Not on file    Number of children: Not on file    Years of education: Not on file    Highest education level: Not on file   Occupational History    Not on file   Tobacco Use    Smoking status: Never Smoker    Smokeless tobacco: Never Used Substance and Sexual Activity    Alcohol use: Not on file    Drug use: Not on file    Sexual activity: Not on file   Other Topics Concern    Not on file   Social History Narrative    Not on file     Social Determinants of Health     Financial Resource Strain:     Difficulty of Paying Living Expenses: Not on file   Food Insecurity:     Worried About Running Out of Food in the Last Year: Not on file    Cora of Food in the Last Year: Not on file   Transportation Needs:     Lack of Transportation (Medical): Not on file    Lack of Transportation (Non-Medical): Not on file   Physical Activity:     Days of Exercise per Week: Not on file    Minutes of Exercise per Session: Not on file   Stress:     Feeling of Stress : Not on file   Social Connections:     Frequency of Communication with Friends and Family: Not on file    Frequency of Social Gatherings with Friends and Family: Not on file    Attends Islam Services: Not on file    Active Member of [address] [place] [address] or Organizations: Not on file    Attends Club or Organization Meetings: Not on file    Marital Status: Not on file   Intimate Partner Violence:     Fear of Current or Ex-Partner: Not on file    Emotionally Abused: Not on file    Physically Abused: Not on file    Sexually Abused: Not on file   Housing Stability:     Unable to Pay for Housing in the Last Year: Not on file    Number of [place] in the Last Year: Not on file    Unstable Housing in the Last Year: Not on file       Family History:   No family history on file. REVIEW OF SYSTEMS: Unable to get information secondary to patient's current condition. Gen: Patient denies any lightheadedness or dizziness. No LOC or syncope. No fevers or chills. HEENT: No earache, sore throat or nasal congestion. Resp: Denies cough, hemoptysis or sputum production. Cardiac: Denies chest pain, SOB, diaphoresis or palpitations. GI: No nausea, vomiting, diarrhea or constipation.   No melena or hematochezia. : No urinary complaints, dysuria, hematuria or frequency. MSK: No extremity weakness, paralysis or paresthesias. PHYSICAL EXAM:    Vitals:  BP (!) 148/102   Pulse 61   Temp 97.8 °F (36.6 °C) (Axillary)   Resp 16   Ht 5' 6\" (1.676 m)   Wt 151 lb 1.6 oz (68.5 kg)   SpO2 100%   BMI 24.39 kg/m²     General:  This is a 78 y.o. yo male who is unresponsive to verbal or painful stimuli at this time. HEENT:  Head is normocephalic and atraumatic, PERRLA, EOMI, mucus membranes dry with no pharyngeal erythema or exudate. Neck:  Supple with no carotid bruits, JVD or thyromegaly.   No cervical adenopathy  CV:  Regular rate and rhythm, 2/6 systolic murmurs  Lungs:  Clear to auscultation bilaterally with no wheezes, rales or rhonchi  Abdomen:  Soft, nontender, nondistended, bowel sounds present  Extremities:  No edema, peripheral pulses intact bilaterally  Neuro: Patient unresponsive to verbal or painful stimuli  Skin:  No rashes, lesions or wounds    DATA:  CBC with Differential:    Lab Results   Component Value Date    WBC 13.5 03/02/2022    RBC 4.41 03/02/2022    HGB 13.0 03/02/2022    HCT 40.8 03/02/2022     03/02/2022    MCV 92.5 03/02/2022    MCH 29.5 03/02/2022    MCHC 31.9 03/02/2022    RDW 13.6 03/02/2022    SEGSPCT 66 04/28/2011    LYMPHOPCT 2.0 03/02/2022    MONOPCT 3.0 03/02/2022    BASOPCT 0.0 03/02/2022    MONOSABS 0.40 03/02/2022    LYMPHSABS 0.27 03/02/2022    EOSABS 0.00 03/02/2022    BASOSABS 0.00 03/02/2022     CMP:    Lab Results   Component Value Date     03/02/2022    K 4.4 03/02/2022    K 3.9 07/22/2021     03/02/2022    CO2 18 03/02/2022    BUN 15 03/02/2022    CREATININE 0.5 03/02/2022    GFRAA >60 03/02/2022    LABGLOM >60 03/02/2022    GLUCOSE 190 03/02/2022    GLUCOSE 142 04/29/2011    PROT 6.5 03/01/2022    LABALBU 3.8 03/01/2022    CALCIUM 8.6 03/02/2022    BILITOT 0.3 03/01/2022    ALKPHOS 113 03/01/2022    AST 39 03/01/2022    ALT 16 03/01/2022     Magnesium:    Lab Results   Component Value Date    MG 1.9 03/02/2022     Phosphorus:    Lab Results   Component Value Date    PHOS 3.5 03/02/2022     PT/INR:    Lab Results   Component Value Date    PROTIME 11.5 03/01/2022    PROTIME 13.2 04/29/2011    INR 1.0 03/01/2022     Troponin:  No results found for: TROPONINI  U/A:    Lab Results   Component Value Date    COLORU Straw 02/28/2022    PROTEINU 30 02/28/2022    PHUR 7.0 02/28/2022    WBCUA 1-3 02/28/2022    RBCUA 0-1 02/28/2022    BACTERIA RARE 02/28/2022    CLARITYU Clear 02/28/2022    SPECGRAV 1.015 02/28/2022    LEUKOCYTESUR Negative 02/28/2022    UROBILINOGEN 0.2 02/28/2022    BILIRUBINUR Negative 02/28/2022    BLOODU TRACE 02/28/2022    GLUCOSEU >=1000 02/28/2022     ABG:    Lab Results   Component Value Date    PH 7.387 02/28/2022    PCO2 40.5 02/28/2022    PO2 116.9 02/28/2022    HCO3 23.8 02/28/2022    BE -1.1 02/28/2022    O2SAT 97.7 02/28/2022     HgBA1c:    Lab Results   Component Value Date    LABA1C 12.3 02/28/2022     FLP:    Lab Results   Component Value Date    TRIG 104 03/01/2022    HDL 59 03/01/2022    LDLCALC 125 03/01/2022    LABVLDL 21 03/01/2022     TSH:    Lab Results   Component Value Date    TSH 2.130 02/28/2022     IRON:  No results found for: IRON  LIPASE:  No results found for: LIPASE    ASSESSMENT AND PLAN:      Patient Active Problem List    Diagnosis Date Noted    Hyperosmolar hyperglycemic state (HHS) ([facility] [address] 02/28/2022    New onset seizure ([facility] [address] 02/28/2022    Altered mental status 02/28/2022    Delirium 07/22/2021     Impression:  1. Altered mental status -probable metabolic encephalopathy  2. Noninsulin dependent diabetes mellitus type 2hyperglycemic-hemoglobin A1c 12.3  3. New onset tonic-clonic seizure  4. Hypertension  5. History of medical noncompliance  6. History of old lacunar infarcts  7.   Possible chronic alcohol abuse    Plan:  Home medications reviewed  IV fluids normal saline 20 KCl 100 cc an hour  Glucoscans 4 times daily with sliding scale insulin  Ativan 1 mg as needed for seizure  Consult neurology  Keppra 500 mg IV piggyback every 12 hours  Possible MRI of the brain if no improvement in mental status  Lovenox 40 mg subcu daily  Consult PT/OT    Transfer to monitored bed when okay with intensivist    CMP, CBC in a..       Mireya Garsia DO, TEE.OAna M  3/2/2022  10:24 AM

## 2022-03-02 NOTE — PROGRESS NOTES
Pharmacy Consultation Note  (Antibiotic Dosing and Monitoring)    Vancomycin has been discontinued; pharmacy will sign-off. Please reconsult if needed.     Thank you,  Harsh Jain, PharmD, BCPS 3/2/2022 10:45 AM

## 2022-03-02 NOTE — PROGRESS NOTES
Above-noted. Results: preliminary CSF results are NOT consistent with central infection. MRI of brain shows no acute pathology. MRI of neck shows cervical canal stenosis ,significant finding that is NOT relevant to coma or reason for hospitalization.

## 2022-03-03 LAB
ANION GAP SERPL CALCULATED.3IONS-SCNC: 10 MMOL/L (ref 7–16)
BUN BLDV-MCNC: 19 MG/DL (ref 6–23)
CALCIUM SERPL-MCNC: 8.6 MG/DL (ref 8.6–10.2)
CHLORIDE BLD-SCNC: 107 MMOL/L (ref 98–107)
CO2: 20 MMOL/L (ref 22–29)
CREAT SERPL-MCNC: 0.6 MG/DL (ref 0.7–1.2)
CRYPTOCOCCAL ANTIGEN: NEGATIVE
GFR AFRICAN AMERICAN: >60
GFR NON-AFRICAN AMERICAN: >60 ML/MIN/1.73
GLUCOSE BLD-MCNC: 203 MG/DL (ref 74–99)
HCT VFR BLD CALC: 38.7 % (ref 37–54)
HEMOGLOBIN: 12.8 G/DL (ref 12.5–16.5)
HIV-1 AND HIV-2 ANTIBODIES: NORMAL
MAGNESIUM: 2 MG/DL (ref 1.6–2.6)
MCH RBC QN AUTO: 30.5 PG (ref 26–35)
MCHC RBC AUTO-ENTMCNC: 33.1 % (ref 32–34.5)
MCV RBC AUTO: 92.1 FL (ref 80–99.9)
METER GLUCOSE: 155 MG/DL (ref 74–99)
METER GLUCOSE: 166 MG/DL (ref 74–99)
METER GLUCOSE: 171 MG/DL (ref 74–99)
METER GLUCOSE: 237 MG/DL (ref 74–99)
METER GLUCOSE: 322 MG/DL (ref 74–99)
METER GLUCOSE: 490 MG/DL (ref 74–99)
PDW BLD-RTO: 13.8 FL (ref 11.5–15)
PHOSPHORUS: 3.4 MG/DL (ref 2.5–4.5)
PLATELET # BLD: 286 E9/L (ref 130–450)
PMV BLD AUTO: 8.8 FL (ref 7–12)
POTASSIUM SERPL-SCNC: 4.4 MMOL/L (ref 3.5–5)
RBC # BLD: 4.2 E12/L (ref 3.8–5.8)
SODIUM BLD-SCNC: 137 MMOL/L (ref 132–146)
WBC # BLD: 12 E9/L (ref 4.5–11.5)

## 2022-03-03 PROCEDURE — 83735 ASSAY OF MAGNESIUM: CPT

## 2022-03-03 PROCEDURE — 6360000002 HC RX W HCPCS: Performed by: INTERNAL MEDICINE

## 2022-03-03 PROCEDURE — 2580000003 HC RX 258: Performed by: INTERNAL MEDICINE

## 2022-03-03 PROCEDURE — 1200000000 HC SEMI PRIVATE

## 2022-03-03 PROCEDURE — 80048 BASIC METABOLIC PNL TOTAL CA: CPT

## 2022-03-03 PROCEDURE — 36415 COLL VENOUS BLD VENIPUNCTURE: CPT

## 2022-03-03 PROCEDURE — 6370000000 HC RX 637 (ALT 250 FOR IP): Performed by: INTERNAL MEDICINE

## 2022-03-03 PROCEDURE — 6370000000 HC RX 637 (ALT 250 FOR IP): Performed by: NURSE PRACTITIONER

## 2022-03-03 PROCEDURE — 97530 THERAPEUTIC ACTIVITIES: CPT

## 2022-03-03 PROCEDURE — 85027 COMPLETE CBC AUTOMATED: CPT

## 2022-03-03 PROCEDURE — 84100 ASSAY OF PHOSPHORUS: CPT

## 2022-03-03 PROCEDURE — 97110 THERAPEUTIC EXERCISES: CPT

## 2022-03-03 PROCEDURE — 6360000002 HC RX W HCPCS: Performed by: NURSE PRACTITIONER

## 2022-03-03 PROCEDURE — 82962 GLUCOSE BLOOD TEST: CPT

## 2022-03-03 RX ORDER — LEVETIRACETAM 500 MG/1
500 TABLET ORAL 2 TIMES DAILY
Status: DISCONTINUED | OUTPATIENT
Start: 2022-03-03 | End: 2022-03-10 | Stop reason: HOSPADM

## 2022-03-03 RX ADMIN — SODIUM CHLORIDE AND POTASSIUM CHLORIDE: 9; 1.49 INJECTION, SOLUTION INTRAVENOUS at 17:47

## 2022-03-03 RX ADMIN — LORAZEPAM 0.5 MG: 2 INJECTION INTRAMUSCULAR; INTRAVENOUS at 21:26

## 2022-03-03 RX ADMIN — DEXAMETHASONE SODIUM PHOSPHATE 10 MG: 10 INJECTION INTRAMUSCULAR; INTRAVENOUS at 03:03

## 2022-03-03 RX ADMIN — INSULIN LISPRO 12 UNITS: 100 INJECTION, SOLUTION INTRAVENOUS; SUBCUTANEOUS at 20:46

## 2022-03-03 RX ADMIN — SODIUM CHLORIDE AND POTASSIUM CHLORIDE: 9; 1.49 INJECTION, SOLUTION INTRAVENOUS at 10:09

## 2022-03-03 RX ADMIN — AMPICILLIN SODIUM 1000 MG: 1 INJECTION, POWDER, FOR SOLUTION INTRAMUSCULAR; INTRAVENOUS at 02:12

## 2022-03-03 RX ADMIN — INSULIN LISPRO 2 UNITS: 100 INJECTION, SOLUTION INTRAVENOUS; SUBCUTANEOUS at 08:39

## 2022-03-03 RX ADMIN — DEXAMETHASONE SODIUM PHOSPHATE 10 MG: 10 INJECTION INTRAMUSCULAR; INTRAVENOUS at 16:41

## 2022-03-03 RX ADMIN — LEVETIRACETAM 500 MG: 500 TABLET, FILM COATED ORAL at 20:41

## 2022-03-03 RX ADMIN — INSULIN GLARGINE 6 UNITS: 100 INJECTION, SOLUTION SUBCUTANEOUS at 08:38

## 2022-03-03 RX ADMIN — ALOGLIPTIN 12.5 MG: 12.5 TABLET, FILM COATED ORAL at 08:33

## 2022-03-03 RX ADMIN — SODIUM CHLORIDE 25 ML: 9 INJECTION, SOLUTION INTRAVENOUS at 20:53

## 2022-03-03 RX ADMIN — THIAMINE HYDROCHLORIDE 500 MG: 100 INJECTION, SOLUTION INTRAMUSCULAR; INTRAVENOUS at 04:56

## 2022-03-03 RX ADMIN — WATER 2000 MG: 1 INJECTION INTRAMUSCULAR; INTRAVENOUS; SUBCUTANEOUS at 15:01

## 2022-03-03 RX ADMIN — ACYCLOVIR SODIUM 650 MG: 50 INJECTION, SOLUTION INTRAVENOUS at 08:35

## 2022-03-03 RX ADMIN — INSULIN LISPRO 2 UNITS: 100 INJECTION, SOLUTION INTRAVENOUS; SUBCUTANEOUS at 00:56

## 2022-03-03 RX ADMIN — LEVETIRACETAM 500 MG: 5 INJECTION, SOLUTION INTRAVENOUS at 08:46

## 2022-03-03 RX ADMIN — ENOXAPARIN SODIUM 40 MG: 100 INJECTION SUBCUTANEOUS at 08:33

## 2022-03-03 RX ADMIN — SODIUM CHLORIDE 25 ML: 9 INJECTION, SOLUTION INTRAVENOUS at 08:34

## 2022-03-03 RX ADMIN — THIAMINE HYDROCHLORIDE 500 MG: 100 INJECTION, SOLUTION INTRAMUSCULAR; INTRAVENOUS at 13:29

## 2022-03-03 RX ADMIN — ASPIRIN 81 MG: 81 TABLET, COATED ORAL at 08:33

## 2022-03-03 RX ADMIN — WATER 2000 MG: 1 INJECTION INTRAMUSCULAR; INTRAVENOUS; SUBCUTANEOUS at 02:05

## 2022-03-03 RX ADMIN — ACYCLOVIR SODIUM 650 MG: 50 INJECTION, SOLUTION INTRAVENOUS at 00:50

## 2022-03-03 RX ADMIN — SODIUM CHLORIDE 25 ML: 9 INJECTION, SOLUTION INTRAVENOUS at 13:27

## 2022-03-03 RX ADMIN — DEXAMETHASONE SODIUM PHOSPHATE 10 MG: 10 INJECTION INTRAMUSCULAR; INTRAVENOUS at 08:33

## 2022-03-03 RX ADMIN — SODIUM CHLORIDE 25 ML: 9 INJECTION, SOLUTION INTRAVENOUS at 16:39

## 2022-03-03 RX ADMIN — ACYCLOVIR SODIUM 650 MG: 50 INJECTION, SOLUTION INTRAVENOUS at 16:40

## 2022-03-03 RX ADMIN — INSULIN LISPRO 8 UNITS: 100 INJECTION, SOLUTION INTRAVENOUS; SUBCUTANEOUS at 13:29

## 2022-03-03 RX ADMIN — AMPICILLIN SODIUM 1000 MG: 1 INJECTION, POWDER, FOR SOLUTION INTRAMUSCULAR; INTRAVENOUS at 05:49

## 2022-03-03 RX ADMIN — DEXAMETHASONE SODIUM PHOSPHATE 10 MG: 10 INJECTION INTRAMUSCULAR; INTRAVENOUS at 20:41

## 2022-03-03 RX ADMIN — SODIUM CHLORIDE AND POTASSIUM CHLORIDE: 9; 1.49 INJECTION, SOLUTION INTRAVENOUS at 03:07

## 2022-03-03 RX ADMIN — INSULIN LISPRO 2 UNITS: 100 INJECTION, SOLUTION INTRAVENOUS; SUBCUTANEOUS at 05:51

## 2022-03-03 RX ADMIN — METFORMIN HYDROCHLORIDE 1000 MG: 500 TABLET ORAL at 18:40

## 2022-03-03 RX ADMIN — THIAMINE HYDROCHLORIDE 500 MG: 100 INJECTION, SOLUTION INTRAMUSCULAR; INTRAVENOUS at 20:53

## 2022-03-03 RX ADMIN — ATORVASTATIN CALCIUM 40 MG: 40 TABLET, FILM COATED ORAL at 20:41

## 2022-03-03 ASSESSMENT — PAIN SCALES - GENERAL
PAINLEVEL_OUTOF10: 0

## 2022-03-03 ASSESSMENT — ENCOUNTER SYMPTOMS: TACHYPNEA: 1

## 2022-03-03 NOTE — PROGRESS NOTES
Physician Progress Note      PATIENT:               Judah Barajas  CSN #:                  [id number]  :                       1945  ADMIT DATE:       2022 9:48 AM  DISCH DATE:  RESPONDING  PROVIDER #:        VICENTE CHRISTIANSON DO          QUERY TEXT:    Pt admitted with AMS, seizures. Noted documentation of sepsis on 3/1& 3/2 by   Critical care consultant. If possible, please document in progress notes and   discharge summary:    The medical record reflects the following:  Risk Factors: AMS, DM with hyperosmolarity, hyperglycemia, Metabolic   encephalopathy, Seizures  Clinical Indicators: 3/1: CRP 1.2, WBC 13.8, temp max 99.9, HR max 118, CSF   fluid no growth, CXR no acute findings,  Treatment: IV Zovirax, Ampicillin, Vanc, Rocephin, Decadron, LP, ID, Neuro,   Crit care consults. Thank you, Franca Silva RN [phone]  Options provided:  -- Sepsis confirmed present on admission  -- Sepsis confirmed not present on admission  -- Sepsis ruled out  -- Other - I will add my own diagnosis  -- Disagree - Not applicable / Not valid  -- Disagree - Clinically unable to determine / Unknown  -- Refer to Clinical Documentation Reviewer    PROVIDER RESPONSE TEXT:    The diagnosis of sepsis was confirmed as present on admission.     Query created by: Alcon Matthews on 3/2/2022 3:20 PM      Electronically signed by:  Sheila Gandara DO 3/3/2022 11:44 AM

## 2022-03-03 NOTE — PROGRESS NOTES
Department of Internal Medicine        CHIEF COMPLAINT: Altered mental status, seizures    Reason for Admission: Altered mental status, seizure, hypoglycemia    HISTORY OF PRESENT ILLNESS:      The patient is a 78 y.o. male who presents with being brought in by paramedics. Apparently patient's neighbors called EMS when he heard the patient screaming and when they found him he was unresponsive. Paramedics came to check blood sugar which was reading high. There is no evidence of any trauma at the scene. Patient was incontinent of urine. The patient had tonic-clonic seizure in the ED and was treated with Ativan and Keppra. CT the head did not show any acute intracranial abnormality. Patient had a random blood sugar of 573 on admission. Liver enzymes essentially normal with a negative drug screen and a WBC 7.1 hemoglobin 13.6.    3/1/2022  Patient seen examined on ICU. The patient is much more alert and oriented currently than he was yesterday. Patient is oriented to person. Patient still weak with very poor memory. He denies any chest or abdominal pain. He denies any headaches or blurred vision. BUN/creatinine 12/0.6. Blood sugars ranging 200s. Hemoglobin A1c was 12.3. WBC 13.8 with hemoglobin 13.3. Sed rate is 21. Temperature 98.1 with heart rate 83 and blood pressure 150/69. O2 sat 98% on room air at rest.  Urine output is very good. Case discussed with Dr. Victorino Royal today. With this patient's altered mental status even though it has improved we are attempting to have IR to do a lumbar puncture. I think with the patient's presentation it would be a very good idea to have this done. 3/2/2022  Patient seen examined on ICU. Patient is a little bit more alert and oriented again today. Patient still very very weak. Patient still has somewhat of a flat affect. Patient does deny any type of chest, abdominal pain. Denies any significant unilateral weakness.   MRI of the brain showed just old (PRINIVIL;ZESTRIL) 10 MG tablet Take 1 tablet by mouth daily 7/27/21   Shy Dumont, DO   thiamine mononitrate (THIAMINE) 100 MG tablet Take 1 tablet by mouth daily 7/27/21   Shy Dumont DO       Allergies:  Patient has no known allergies. Social History:   Social History     Socioeconomic History    Marital status:      Spouse name: Not on file    Number of children: Not on file    Years of education: Not on file    Highest education level: Not on file   Occupational History    Not on file   Tobacco Use    Smoking status: Never Smoker    Smokeless tobacco: Never Used   Substance and Sexual Activity    Alcohol use: Not on file    Drug use: Not on file    Sexual activity: Not on file   Other Topics Concern    Not on file   Social History Narrative    Not on file     Social Determinants of Health     Financial Resource Strain:     Difficulty of Paying Living Expenses: Not on file   Food Insecurity:     Worried About Running Out of Food in the Last Year: Not on file    Cora of Food in the Last Year: Not on file   Transportation Needs:     Lack of Transportation (Medical): Not on file    Lack of Transportation (Non-Medical):  Not on file   Physical Activity:     Days of Exercise per Week: Not on file    Minutes of Exercise per Session: Not on file   Stress:     Feeling of Stress : Not on file   Social Connections:     Frequency of Communication with Friends and Family: Not on file    Frequency of Social Gatherings with Friends and Family: Not on file    Attends [employer] Services: Not on file    Active Member of Clubs or Organizations: Not on file    Attends Club or Organization Meetings: Not on file    Marital Status: Not on file   Intimate Partner Violence:     Fear of Current or Ex-Partner: Not on file    Emotionally Abused: Not on file    Physically Abused: Not on file    Sexually Abused: Not on file   Housing Stability:     Unable to Pay for Housing in the Last Year: Not on file    Number of Places Lived in the Last Year: Not on file    Unstable Housing in the Last Year: Not on file       Family History:   No family history on file. REVIEW OF SYSTEMS: Unable to get information secondary to patient's current condition. Gen: Patient denies any lightheadedness or dizziness. No LOC or syncope. No fevers or chills. HEENT: No earache, sore throat or nasal congestion. Resp: Denies cough, hemoptysis or sputum production. Cardiac: Denies chest pain, SOB, diaphoresis or palpitations. GI: No nausea, vomiting, diarrhea or constipation. No melena or hematochezia. : No urinary complaints, dysuria, hematuria or frequency. MSK: No extremity weakness, paralysis or paresthesias. PHYSICAL EXAM:    Vitals:  BP (!) 142/60   Pulse 89   Temp 97.7 °F (36.5 °C) (Oral)   Resp 18   Ht 5' 6\" (1.676 m)   Wt 151 lb 1.6 oz (68.5 kg)   SpO2 97%   BMI 24.39 kg/m²     General:  This is a 78 y.o. yo male who is unresponsive to verbal or painful stimuli at this time. HEENT:  Head is normocephalic and atraumatic, PERRLA, EOMI, mucus membranes dry with no pharyngeal erythema or exudate. Neck:  Supple with no carotid bruits, JVD or thyromegaly.   No cervical adenopathy  CV:  Regular rate and rhythm, 2/6 systolic murmurs  Lungs:  Clear to auscultation bilaterally with no wheezes, rales or rhonchi  Abdomen:  Soft, nontender, nondistended, bowel sounds present  Extremities:  No edema, peripheral pulses intact bilaterally  Neuro: Patient unresponsive to verbal or painful stimuli  Skin:  No rashes, lesions or wounds    DATA:  CBC with Differential:    Lab Results   Component Value Date    WBC 12.0 03/03/2022    RBC 4.20 03/03/2022    HGB 12.8 03/03/2022    HCT 38.7 03/03/2022     03/03/2022    MCV 92.1 03/03/2022    MCH 30.5 03/03/2022    MCHC 33.1 03/03/2022    RDW 13.8 03/03/2022    SEGSPCT 66 04/28/2011    LYMPHOPCT 2.0 03/02/2022    MONOPCT 3.0 03/02/2022 List    Diagnosis Date Noted    Hyperosmolar hyperglycemic state (HHS) ([facility] [address] 02/28/2022    New onset seizure ([facility] [address] 02/28/2022    Altered mental status 02/28/2022    Delirium 07/22/2021     Impression:  1. Altered mental status -probable metabolic encephalopathy  2. Noninsulin dependent diabetes mellitus type 2hyperglycemic-hemoglobin A1c 12.3  3. New onset tonic-clonic seizure  4. Hypertension  5. History of medical noncompliance  6. History of old lacunar infarcts  7. Possible chronic alcohol abuse    Plan:  Home medications reviewed  IV fluids normal saline 20 KCl 100 cc an hour  Glucoscans 4 times daily with sliding scale insulin  Ativan 1 mg as needed for seizure  Consult neurology  Keppra 500 mg IV piggyback every 12 hours  Possible MRI of the brain if no improvement in mental status  Lovenox 40 mg subcu daily  Consult PT/OT    Transfer to monitored bed when okay with intensivist    CMP, CBC in a.m.       Martell Rivera DO, D.O.  3/3/2022  11:19 AM

## 2022-03-03 NOTE — PROGRESS NOTES
Physical Therapy  Physical Therapy Treatment Note/Plan of Care    Room #:  IC02/IC02-01  Patient Name: Jennifer Pineda  YOB: 1945  MRN: [id number]    Date of Service: 3/3/2022     Tentative placement recommendation: Subacute rehab  Equipment recommendation: To be determined      Evaluating Physical Therapist: Gurpreet Casper Physical Therapist      Specific Provider Orders/Date/Referring Provider : 02/28/22 1645   PT eval and treat Start: 02/28/22 1645, End: 02/28/22 1645, ONE TIME, Standing Count: 1 Occurrences, R    Demetrice Abraham DO      Admitting Diagnosis:   Infestation by bed bug [B88.8]  Seizure ([address] [R56.9]  Altered mental status [R41.82]  Altered mental status, unspecified altered mental status type [R41.82]  Hyperosmolar hyperglycemic state (HHS) ([address] [E11.00, E11.65]      Surgery: Lumbar puncture 3/1  Visit Diagnoses       Codes    Seizure ([address]     R56.9    Infestation by bed bug     B88.8          Patient Active Problem List   Diagnosis    Delirium    Hyperosmolar hyperglycemic state (HHS) ([address]    New onset seizure ([address]    Altered mental status        ASSESSMENT of Current Deficits Patient exhibits decreased strength, balance and endurance impairing functional mobility, transfers, gait  and gait distance. Patient was pleasant during therapy and hard of hearing. Patient requires consistent verbal and tactile cueing to perform functional mobility. Patient demonstrated improved balance sitting edge of bed and required Max assist to stand. Patient was able to maintain stable vitals throughout session without any oxygen. Nursing requested patient to be in bed at end of session. The patient will benefit from continued skilled therapy to increase strength and improve balance for safe functional mobility, to decrease risk of falls, and to meet goals at discharge.       PHYSICAL THERAPY  PLAN OF CARE       Physical therapy plan of care is established based on physician order, patient diagnosis and clinical assessment    Current Treatment Recommendations:    -Bed Mobility: Lower extremity exercises  and Upper extremity exercises   -Sitting Balance: Incorporate reaching activities to activate trunk muscles  and Hands on support to maintain midline   -Standing Balance: Perform strengthening exercises in standing to promote motor control with or without upper extremity support  and Perform sit to stand activities maintaining FWB (full weight bearing) weightbearing Bilateral   -Transfers: Provide instruction on proper hand and foot position for adequate transfer of weight onto lower extremities and use of gait device if needed and Cues for hand placement, technique and safety. Provide stabilization to prevent fall   -Gait: Gait training, Standing activities to improve: base of support, weight shift, weight bearing  and Use of Assistive device for FWB (full weight bearing) weight bearing Bilateral     -Endurance: Utilize Supervised activities to increase level of endurance to allow for safe functional mobility including transfers and gait  and Use graduated activities to promote good breathing techniques and provide support and education to maximize respiratory function    PT long term treatment goals are located in below grid    Patient and or family understand(s) diagnosis, prognosis, and plan of care. Frequency of treatments: Patient will be seen  daily. Prior Level of Function: Patient poor historian unable to provide information about ambulation. Rehab Potential: good  for baseline    Past medical history:   Past Medical History:   Diagnosis Date    Type 2 diabetes mellitus with hyperglycemia, without long-term current use of insulin ([facility]      History reviewed. No pertinent surgical history. SUBJECTIVE:    Precautions: Continuous Pulse Oximetry , falls and seizures , brothers,  Social history: Patient lived at home alone in [facility] according to chart.  Patient jennifer oropeza. Equipment owned: Patient jennifer historian    AM-PAC Basic Mobility       AM-[facility] Mobility Inpatient   How much difficulty turning over in bed?: A Little  How much difficulty sitting down on / standing up from a chair with arms?: A Lot  How much difficulty moving from lying on back to sitting on side of bed?: A Lot  How much help from another person moving to and from a bed to a chair?: A Lot  How much help from another person needed to walk in hospital room?: A Lot  How much help from another person for climbing 3-5 steps with a railing?: Total  AM-PAC Inpatient Mobility Raw Score : 12  AM-PAC Inpatient T-Scale Score : 35.33  Mobility Inpatient CMS 0-100% Score: 68.66  Mobility Inpatient CMS G-Code Modifier : CL    Nursing cleared patient for PT treatment. .   OBJECTIVE;   Initial Evaluation  Date: 3/2/2022 Treatment Date:  3/3/2022     Short Term/ Long Term   Goals   Was pt agreeable to Eval/treatment? Yes Yes To be met in 5 days   Pain level   0/10   0/10    Bed Mobility    Rolling: Maximal assist of 1    Supine to sit: Maximal assist of 1    Sit to supine: Maximal assist of 1    Scooting: Maximal assist of 1   Rolling: Minimal assist of 1   Supine to sit: Moderate assist of 1   Sit to supine: Moderate assist of 1   Scooting: Moderate assist of 1    Rolling: Minimal assist of 1    Supine to sit: Minimal assist of 1    Sit to supine: Minimal assist of 1    Scooting: Minimal assist of 1     Transfers Sit to stand: Moderate assist of  2  Sit to stand: Maximal assist of 1 with knee blocked and anterior lean with HHA. Sit to stand:  Moderate assist of 1    Ambulation    not assessed    25 feet using  wheeled walker with Moderate assist of 1    Stair negotiation: ascended and descended   Not assessed     Not assessed    ROM Within functional limits    Increase range of motion 10% of affected joints    Strength BUE:  refer to OT eval  RLE:  3+/5  LLE:  3+/5  Increase strength in affected mm groups by 1/3 grade   Balance Sitting EOB:  poor + posterior lean  Dynamic Standing:  poor  Sitting EOB: fair   Dynamic Standing: poor +   Sitting EOB:  fair   Dynamic Standing: fair      Patient is Alert & Oriented x person and situation and follows one step directions    Sensation:  Patient  denies numbness/tingling   Edema:  no   Endurance: poor +    Vitals: room air   Blood Pressure at rest  171/97 Blood Pressure during session 153/75   Heart Rate at rest 94 Heart Rate during session    SPO2 at rest 100%  SPO2 during session %     Patient education  Patient educated on role of Physical Therapy, risks of immobility, safety and plan of care, energy conservation,  importance of mobility while in hospital , purse lip breathing, ankle pumps, quad set and glut set for edema control, blood clot prevention and safety      Patient response to education:   Pt verbalized understanding Pt demonstrated skill Pt requires further education in this area   Yes Partial Yes      Treatment:  Patient practiced and was instructed/facilitated in the following treatment: Patient Sat edge of bed 10 minutes with Moderate assist of 1 to increase dynamic sitting balance and activity tolerance. Patient performed Sit to Stand transfer with cues for hand placement, safety, and reaching back before sitting. Knee blocked when standing. Patient assisted back to supine. Therapeutic Exercises:  ankle pumps, heel slide, hip abduction/adduction, straight leg raise, long arc quad and seated marching  x 15 reps. At end of session, patient in bed with alarm call light and phone within reach,  all lines and tubes intact, nursing notified. Patient would benefit from continued skilled Physical Therapy to improve functional independence and quality of life.          Patient's/ family goals   none stated    Time in  1412  Time out  1435    Total Treatment Time  23 minutes    CPT codes:  Therapeutic activities (69713)   13 minutes  1 unit(s)  Therapeutic exercises (27560)   10 minutes  1 unit(s)     Gayathri Laureano, [address] [facility] #[id number]

## 2022-03-03 NOTE — PROGRESS NOTES
Patient's bed alarm ringing. Upon entering patient 1/2 out of bed. Patient disoriented and states that he did not remember to call for help. Patient reports that he needed to have a bowel movement. Placed patient on a bed pan but he was unsuccessful. Patient's bed alarm rearmed.

## 2022-03-03 NOTE — CARE COORDINATION
3/3/2022 ICU, room air. Pt accepted at [facility]- per Orlando Palacio, PAYAM needs signed, Need HENS at discharge. Family updated- daughter in law in room visiting now. CM/SS assigned to follow.  Electronically signed by Stephenie Sands RN-BC on 3/3/2022 at 9:47 AM

## 2022-03-03 NOTE — PROCEDURES
[address]                          ELECTROENCEPHALOGRAM REPORT    PATIENT NAME: Sivan Baker                 :        1945  MED REC NO:   [id number]                            ROOM:       [facility]  ACCOUNT NO:   [de-identified]                           ADMIT DATE: 2022  PROVIDER:     Sonia Amor MD    DATE OF EE2022    EEG DIAGNOSIS:  Awake normal sleep normal report. A 19-channel EEG was recorded and demonstrates a background rhythm of  approximately 7 Hz independent of eye closure. Hyperventilation and  photic stimulation were not performed. During the sleep portion of the  record, there was no activation. INTERPRETATION:  Essentially normal awake and sleep EEG for age. No  epileptiform activity noted, which does not rule out underlying seizure  disorder. Clinical correlation advised.         Sandy Shone, MD    D: 2022 7:08:59       T: 2022 7:11:17     [email or web address]  Job#: [id number]     Doc#: [id number]    CC:

## 2022-03-03 NOTE — CARE COORDINATION
3/3/2022 updated daughter in law- Carlene Romero on discharge planning.  Electronically signed by Roney Hernandez RN-BC on 3/3/2022 at 1:55 PM

## 2022-03-03 NOTE — PROGRESS NOTES
[facility]  [facility]  Progress Note    NAME: Adriel Light  MR:  [id number]  :   1945  DATE OF SERVICE:22    This is a face to face encounter with Adriel Light 78 y.o. male on 22    CHIEF COMPLAINT     ID following for   Chief Complaint   Patient presents with    Fall    Seizures     HISTORY OF PRESENT ILLNESS   Pt seen and examined  22   In bed more awake   has poor memory   Afebrile  On RA  wbc12     3/2/2022  In ICU in bed on o2 2L afebrile   arousable not oriented  Wbc13.5  S/p Lp     Patient is tolerating medications. No reported adverse drug reactions. REVIEW OF SYSTEMS     As stated above in the chief complaint, otherwise negative.   CURRENT MEDICATIONS      atorvastatin  40 mg Oral Nightly    acyclovir  10 mg/kg (Ideal) IntraVENous Q8H    ampicillin IV  1,000 mg IntraVENous 6 times per day    dexamethasone  10 mg IntraVENous Q6H    insulin lispro  0-12 Units SubCUTAneous Q4H    cefTRIAXone (ROCEPHIN) IV  2,000 mg IntraVENous Q12H    insulin glargine  6 Units SubCUTAneous Daily    thiamine (VITAMIN B1) IVPB  500 mg IntraVENous Q8H    Followed by   Alethea Friedman ON 3/4/2022] thiamine (VITAMIN B1) IVPB  100 mg IntraVENous Q8H    sodium chloride flush  5-40 mL IntraVENous 2 times per day    sodium chloride flush  5-40 mL IntraVENous 2 times per day    aspirin  81 mg Oral Daily    alogliptin  12.5 mg Oral Daily    [Held by provider] metFORMIN  1,000 mg Oral BID WC    enoxaparin  40 mg SubCUTAneous Daily    [Held by provider] insulin lispro  0-12 Units SubCUTAneous TID WC    [Held by provider] insulin lispro  0-6 Units SubCUTAneous Nightly    levETIRAcetam  500 mg IntraVENous BID     Continuous Infusions:   sodium chloride      sodium chloride      0.9% NaCl with KCl 20 mEq 150 mL/hr at 22 0600    dextrose      dextrose 5 % and 0.45 % NaCl      [Held by provider] insulin Stopped (22 0657)    dextrose       PRN Meds:haloperidol lactate, sodium chloride flush, sodium chloride, LORazepam, sodium chloride flush, sodium chloride, LORazepam **OR** LORazepam **OR** LORazepam **OR** LORazepam **OR** LORazepam **OR** LORazepam **OR** LORazepam **OR** LORazepam, acetaminophen, acetaminophen, polyethylene glycol, dextrose, dextrose 5 % and 0.45 % NaCl, glucose, glucagon (rDNA), dextrose, dextrose bolus (hypoglycemia) **OR** dextrose bolus (hypoglycemia)    PHYSICAL EXAM     BP (!) 151/66   Pulse 68   Temp 97.6 °F (36.4 °C) (Oral)   Resp 15   Ht 5' 6\" (1.676 m)   Wt 151 lb 1.6 oz (68.5 kg)   SpO2 96%   BMI 24.39 kg/m²   Temp  Av.6 °F (36.4 °C)  Min: 97.2 °F (36.2 °C)  Max: 97.8 °F (36.6 °C)  Constitutional:  The patient is arousable  Skin:      rashes were noted. No chnage  HEENT:   Round and reactive pupils. AT/NC  Neck:    Supple to movements. Chest:   No use of accessory muscles to breathe. Symmetrical expansion. Cardiovascular:  S1 and S2 are rhythmic and regular. No murmurs appreciated. Abdomen:   Positive bowel sounds to auscultation. Benign to palpation. distended  Extremities:   No clubbing, no cyanosis, no edema.   CNS    Awake   Lines: piv      DIAGNOSTIC RESULTS   Radiology:    Recent Labs     22  0447 22  0637 22  0459   WBC 13.8* 13.5* 12.0*   RBC 4.41 4.41 4.20   HGB 13.3 13.0 12.8   HCT 40.0 40.8 38.7   MCV 90.7 92.5 92.1   MCH 30.2 29.5 30.5   MCHC 33.3 31.9* 33.1   RDW 13.4 13.6 13.8    268 286   MPV 8.6 8.5 8.8     Recent Labs     22  1037 22  1901 22  0447 22  0510 22  0459     --  138 133 137   K 4.0  --  3.8 4.4 4.4   CL 95*  --  104 104 107   CO2 27  --  24 18* 20*   BUN 11  --  12 15 19   CREATININE 0.7  --  0.6* 0.5* 0.6*   GLUCOSE 573*   < > 91 190* 203*   PROT 7.2  --  6.5  --   --    LABALBU 4.3  --  3.8  --   --    CALCIUM 9.5  --  9.0 8.6 8.6   BILITOT 0.4  --  0.3  --   --    ALKPHOS 263*  --  113  --   --    AST 16  --  39 --   --    ALT 16  --  16  --   --     < > = values in this interval not displayed. Lab Results   Component Value Date    CRP 1.4 (H) 03/01/2022     Lab Results   Component Value Date    SEDRATE 21 (H) 03/01/2022     Recent Labs     02/28/22  1037 03/01/22  0447 03/01/22  0830   CRP  --  1.4*  --    PROCAL  --  0.07  --    INR  --   --  1.0   PROTIME  --   --  11.5   AST 16 39  --    ALT 16 16  --    TRIG  --  104  --      Lab Results   Component Value Date    CHOL 205 03/01/2022    TRIG 104 03/01/2022    HDL 59 03/01/2022    LDLCALC 125 03/01/2022    LABVLDL 21 03/01/2022     Microbiology:   hsv csf pending  Csf cx ngtd  meningitis encephalitis panel neg     FINAL IMPRESSION    Pt had   Chief Complaint   Patient presents with    Fall    Seizures    Admitted for Infestation by bed bug [B88.8]  Seizure ([facility] [address] [R56.9]  Altered mental status [R41.82]  Altered mental status, unspecified altered mental status type [R41.82]  Hyperosmolar hyperglycemic state (HHS) ([facility] [address] [E11.00, E11.65]  On treatment for   Leukocytosis tredning down   eval for CNS infection   Has Small chronic right frontal lobe infarction.  Multiple chronic lacunar   Infarctions    acyclovir (ZOVIRAX) 650 mg in dextrose 5 % 100 mL IVPB, Q8H  ampicillin 1000 mg ivpb mini bag, 6 times per day will stop   dexamethasone (DECADRON) injection 10 mg, Q6H  cefTRIAXone (ROCEPHIN) 2,000 mg in sterile water 20 mL IV syringe, Q12H           · Monitor labs    Imaging and labs were reviewed per medical records. Thank you for involving me in the care of Nidia Cuff will continue to follow. Please do not hesitate to call for any questions or concerns.     Electronically signed by Quita Gottron, MD on 3/3/2022 at 7:56 AM

## 2022-03-03 NOTE — PROGRESS NOTES
bilaterally, no adventitious sounds auscultated, no accessory muscle use  CV: Regular rate, no murmurs, no JVD, no leg edema  Abdomen: Soft, non tender, + bowel sounds, no lesions  Skin: Hydrated, adequate turgor, no rash, capillary refill <2 seconds  Extremities: Muscular strength 3/5 in 4 limbs, moves 4 limbs spontaneously, distal pulses present  Neurology: Awake and alert, follows commands, moves 4 limbs on command and spontaneously, neck is supple, no meningitic signs present. Last 3 CMP:    Recent Labs     02/28/22  1037 02/28/22  1901 03/01/22  0447 03/02/22  0510 03/03/22  0459     --  138 133 137   K 4.0  --  3.8 4.4 4.4   CL 95*  --  104 104 107   CO2 27  --  24 18* 20*   BUN 11  --  12 15 19   CREATININE 0.7  --  0.6* 0.5* 0.6*   GLUCOSE 573*   < > 91 190* 203*   CALCIUM 9.5  --  9.0 8.6 8.6   PROT 7.2  --  6.5  --   --    LABALBU 4.3  --  3.8  --   --    BILITOT 0.4  --  0.3  --   --    ALKPHOS 263*  --  113  --   --    AST 16  --  39  --   --    ALT 16  --  16  --   --     < > = values in this interval not displayed. Recent Labs     03/03/22  0459   WBC 12.0*   RBC 4.20   HGB 12.8   HCT 38.7   MCV 92.1   MCH 30.5   MCHC 33.1   RDW 13.8      MPV 8.8       No results for input(s): BC in the last 72 hours. No results for input(s): Haddad Bienenstock in the last 72 hours. 24 HR INTAKE/OUTPUT:      Intake/Output Summary (Last 24 hours) at 3/3/2022 0953  Last data filed at 3/3/2022 0847  Gross per 24 hour   Intake 5248.31 ml   Output 3160 ml   Net 2088. 31 ml     MEDICATIONS:   insulin lispro  0-12 Units SubCUTAneous 4x Daily AC & HS    levETIRAcetam  500 mg Oral BID    atorvastatin  40 mg Oral Nightly    acyclovir  10 mg/kg (Ideal) IntraVENous Q8H    dexamethasone  10 mg IntraVENous Q6H    cefTRIAXone (ROCEPHIN) IV  2,000 mg IntraVENous Q12H    insulin glargine  6 Units SubCUTAneous Daily    thiamine (VITAMIN B1) IVPB  500 mg IntraVENous Q8H    Followed by   Brown Zimmer ON 3/4/2022] thiamine (VITAMIN B1) IVPB  100 mg IntraVENous Q8H    sodium chloride flush  5-40 mL IntraVENous 2 times per day    sodium chloride flush  5-40 mL IntraVENous 2 times per day    aspirin  81 mg Oral Daily    alogliptin  12.5 mg Oral Daily    metFORMIN  1,000 mg Oral BID WC    enoxaparin  40 mg SubCUTAneous Daily      sodium chloride      sodium chloride 25 mL (03/03/22 0834)    0.9% NaCl with KCl 20 mEq 150 mL/hr at 03/03/22 0825    dextrose      dextrose 5 % and 0.45 % NaCl      dextrose       sodium chloride flush, sodium chloride, LORazepam, sodium chloride flush, sodium chloride, LORazepam **OR** LORazepam **OR** LORazepam **OR** LORazepam **OR** LORazepam **OR** LORazepam **OR** LORazepam **OR** LORazepam, acetaminophen, acetaminophen, polyethylene glycol, dextrose, dextrose 5 % and 0.45 % NaCl, glucose, glucagon (rDNA), dextrose, dextrose bolus (hypoglycemia) **OR** dextrose bolus (hypoglycemia)    OBJECTIVE:  Vitals:    03/03/22 0847   BP:    Pulse: 83   Resp:    Temp:    SpO2: 96%        O2 Flow Rate (L/min): 0 L/min  O2 Device: None (Room air)        LABS:  WBC   Date Value Ref Range Status   03/03/2022 12.0 (H) 4.5 - 11.5 E9/L Final   03/02/2022 13.5 (H) 4.5 - 11.5 E9/L Final   03/01/2022 13.8 (H) 4.5 - 11.5 E9/L Final     Hemoglobin   Date Value Ref Range Status   03/03/2022 12.8 12.5 - 16.5 g/dL Final   03/02/2022 13.0 12.5 - 16.5 g/dL Final   03/01/2022 13.3 12.5 - 16.5 g/dL Final     Hematocrit   Date Value Ref Range Status   03/03/2022 38.7 37.0 - 54.0 % Final   03/02/2022 40.8 37.0 - 54.0 % Final   03/01/2022 40.0 37.0 - 54.0 % Final     MCV   Date Value Ref Range Status   03/03/2022 92.1 80.0 - 99.9 fL Final   03/02/2022 92.5 80.0 - 99.9 fL Final   03/01/2022 90.7 80.0 - 99.9 fL Final     Platelets   Date Value Ref Range Status   03/03/2022 286 130 - 450 E9/L Final   03/02/2022 268 130 - 450 E9/L Final   03/01/2022 302 130 - 450 E9/L Final     Sodium   Date Value Ref Range Status 03/03/2022 137 132 - 146 mmol/L Final   03/02/2022 133 132 - 146 mmol/L Final   03/01/2022 138 132 - 146 mmol/L Final     Potassium   Date Value Ref Range Status   03/03/2022 4.4 3.5 - 5.0 mmol/L Final   03/02/2022 4.4 3.5 - 5.0 mmol/L Final   03/01/2022 3.8 3.5 - 5.0 mmol/L Final     Potassium reflex Magnesium   Date Value Ref Range Status   07/22/2021 3.9 3.5 - 5.0 mmol/L Final     Chloride   Date Value Ref Range Status   03/03/2022 107 98 - 107 mmol/L Final   03/02/2022 104 98 - 107 mmol/L Final   03/01/2022 104 98 - 107 mmol/L Final     CO2   Date Value Ref Range Status   03/03/2022 20 (L) 22 - 29 mmol/L Final   03/02/2022 18 (L) 22 - 29 mmol/L Final   03/01/2022 24 22 - 29 mmol/L Final     BUN   Date Value Ref Range Status   03/03/2022 19 6 - 23 mg/dL Final   03/02/2022 15 6 - 23 mg/dL Final   03/01/2022 12 6 - 23 mg/dL Final     CREATININE   Date Value Ref Range Status   03/03/2022 0.6 (L) 0.7 - 1.2 mg/dL Final   03/02/2022 0.5 (L) 0.7 - 1.2 mg/dL Final   03/01/2022 0.6 (L) 0.7 - 1.2 mg/dL Final     Glucose   Date Value Ref Range Status   03/03/2022 203 (H) 74 - 99 mg/dL Final   03/02/2022 190 (H) 74 - 99 mg/dL Final   03/01/2022 91 74 - 99 mg/dL Final   04/29/2011 142 (H) 70 - 110 mg/dL Final   04/28/2011 140 (H) 70 - 110 mg/dL Final   04/22/2011 111 (H) 70 - 110 mg/dL Final     Calcium   Date Value Ref Range Status   03/03/2022 8.6 8.6 - 10.2 mg/dL Final   03/02/2022 8.6 8.6 - 10.2 mg/dL Final   03/01/2022 9.0 8.6 - 10.2 mg/dL Final     Total Protein   Date Value Ref Range Status   03/01/2022 6.5 6.4 - 8.3 g/dL Final   02/28/2022 7.2 6.4 - 8.3 g/dL Final   07/23/2021 5.9 (L) 6.4 - 8.3 g/dL Final     Albumin   Date Value Ref Range Status   03/01/2022 3.8 3.5 - 5.2 g/dL Final   02/28/2022 4.3 3.5 - 5.2 g/dL Final   07/23/2021 3.4 (L) 3.5 - 5.2 g/dL Final     Total Bilirubin   Date Value Ref Range Status   03/01/2022 0.3 0.0 - 1.2 mg/dL Final   02/28/2022 0.4 0.0 - 1.2 mg/dL Final   07/23/2021 0.5 0.0 - 1.2 mg/dL Final     Alkaline Phosphatase   Date Value Ref Range Status   03/01/2022 113 40 - 129 U/L Final   02/28/2022 263 (H) 40 - 129 U/L Final   07/23/2021 102 40 - 129 U/L Final     AST   Date Value Ref Range Status   03/01/2022 39 0 - 39 U/L Final   02/28/2022 16 0 - 39 U/L Final   07/23/2021 17 0 - 39 U/L Final     ALT   Date Value Ref Range Status   03/01/2022 16 0 - 40 U/L Final   02/28/2022 16 0 - 40 U/L Final   07/23/2021 20 0 - 40 U/L Final     GFR Non-   Date Value Ref Range Status   03/03/2022 >60 >=60 mL/min/1.73 Final     Comment:     Chronic Kidney Disease: less than 60 ml/min/1.73 sq.m. Kidney Failure: less than 15 ml/min/1.73 sq.m. Results valid for patients 18 years and older. 03/02/2022 >60 >=60 mL/min/1.73 Final     Comment:     Chronic Kidney Disease: less than 60 ml/min/1.73 sq.m. Kidney Failure: less than 15 ml/min/1.73 sq.m. Results valid for patients 18 years and older. 03/01/2022 >60 >=60 mL/min/1.73 Final     Comment:     Chronic Kidney Disease: less than 60 ml/min/1.73 sq.m. Kidney Failure: less than 15 ml/min/1.73 sq.m. Results valid for patients 18 years and older. GFR    Date Value Ref Range Status   03/03/2022 >60  Final   03/02/2022 >60  Final   03/01/2022 >60  Final     Magnesium   Date Value Ref Range Status   03/03/2022 2.0 1.6 - 2.6 mg/dL Final   03/02/2022 1.9 1.6 - 2.6 mg/dL Final   03/01/2022 2.0 1.6 - 2.6 mg/dL Final     Phosphorus   Date Value Ref Range Status   03/03/2022 3.4 2.5 - 4.5 mg/dL Final   03/02/2022 3.5 2.5 - 4.5 mg/dL Final   03/01/2022 2.1 (L) 2.5 - 4.5 mg/dL Final     Recent Labs     02/28/22  1213   PH 7.387   PO2 116.9*   PCO2 40.5   HCO3 23.8   BE -1.1   O2SAT 97.7       RADIOLOGY:  MRI CERVICAL SPINE W WO CONTRAST   Final Result   1. No fracture or bony destructive lesion is seen.    2. Specifically, no pathologic lesions are identified in the C2 or C3   vertebral bodies to correspond with the findings of the prior CT. 3. Severe central canal stenosis at C3-4. Moderate stenosis at C5-6.   4.  Multilevel neural foraminal stenoses, worst (severe) at the right C4-5,   bilateral C5-6 and right C7-T1 levels. RECOMMENDATIONS:   Unavailable         MRI BRAIN W WO CONTRAST   Final Result   1. No acute intracranial abnormality. .   2.  No mass, mass effect, edema or hemorrhage is seen. 3. Small chronic right frontal lobe infarction. Multiple chronic lacunar   infarctions, as described. RECOMMENDATIONS:   Unavailable         IR LUMBAR PUNCTURE FOR DIAGNOSIS   Final Result   Successful fluoroscopic-guided lumbar puncture. CT HEAD WO CONTRAST   Final Result   1. No acute intracranial abnormality. 2. Chronic small vessel ischemic disease. CT CERVICAL SPINE WO CONTRAST   Final Result   1. No acute fracture or subluxation. 2. Degenerative changes in the cervical spine with central canal stenosis at   C3-4 and multilevel neural foraminal narrowing. 3. There are nonspecific lucent lesions within the C2 body and left lateral   mass as well as a sclerotic lesion within the C3 spinous process. These may   represent benign findings. Consider MRI cervical spine or bone scan if the   patient has history of malignancy. XR CHEST PORTABLE   Final Result   The cardiac silhouette and interstitial markings are somewhat prominent could   be accentuated by more shallow inspiration lighter technique compared to   prior exam.  However, consider upright PA and lateral views when the patient   is able to ensure resolution. PROBLEM LIST:  Principal Problem:    Delirium  Active Problems:    Hyperosmolar hyperglycemic state (HHS) ([address]    New onset seizure ([address]    Altered mental status  Resolved Problems:    * No resolved hospital problems.  *      ATTESTATION:  ICU Staff Physician note of personal involvement in Care  As the attending physician, I certify that I personally reviewed the patients history and personnally examined the patient to confirm the physical findings described above,  And that I reviewed the relevant imaging studies and available reports. I also discussed the differential diagnosis and all of the proposed management plans with the patient and individuals accompanying the patient to this visit. They had the opportunity to ask questions about the proposed management plans and to have those questions answered. This patient has a high probability of sudden, clinically significant deterioration, which requires the highest level of physician preparedness to intervene urgently. I managed/supervised life or organ supporting interventions that required frequent physician assessment. I devoted my full attention to the direct care of this patient for the amount of time indicated below. Time I spent with the family or surrogate(s) is included only if the patient was incapable of providing the necessary information or participating in medical decisions  Time devoted to teaching and to any procedures I billed separately is not included.      CRITICAL CARE TIME:  30 minutes    Jean-Paul Chilel MD  Pulmonary and Critical Care Medicine

## 2022-03-03 NOTE — DISCHARGE INSTR - COC
Colostomy/Ileostomy/Ileal Conduit: No       Date of Last BM: 3/9/22      Intake/Output Summary (Last 24 hours) at 3/3/2022 0936  Last data filed at 3/3/2022 0847  Gross per 24 hour   Intake 5248.31 ml   Output 3160 ml   Net 2088. 31 ml     I/O last 3 completed shifts: In: 7991.2 [P.O.:450; I.V.:5323.4; IV Piggyback:2217.8]  Out: 4260 [Urine:4260]    Safety Concerns: At Risk for Falls    Impairments/Disabilities:      Hearing    Nutrition Therapy:  Current Nutrition Therapy:   - Oral Diet:  General    Routes of Feeding: Oral  Liquids: Thin Liquids  Daily Fluid Restriction: no  Last Modified Barium Swallow with Video (Video Swallowing Test): not done    Treatments at the Time of Hospital Discharge:   Respiratory Treatments:     Oxygen Therapy:  is not on home oxygen therapy.   Ventilator:    - No ventilator support    Rehab Therapies: Physical Therapy and Occupational Therapy  Weight Bearing Status/Restrictions: No weight bearing restrictions  Other Medical Equipment (for information only, NOT a DME order):  walker  Other Treatments: ***    Patient's personal belongings (please select all that are sent with patient):  None    RN SIGNATURE:  Electronically signed by Olga Ayala RN on 3/10/22 at 3:32 PM EST    CASE MANAGEMENT/SOCIAL WORK SECTION    Inpatient Status Date: 02/28/22    Readmission Risk Assessment Score:  Readmission Risk              Risk of Unplanned Readmission:  21           Discharging to Facility/ Agency   Name: [facility] [address] [place] [address], [address] [facility]  [phone]  Fax:    / signature: Electronically signed by Anne Marie Carr RN-BC on 3/3/2022 at 9:38 AM      PHYSICIAN SECTION    Prognosis: Good    Condition at Discharge: Stable    Rehab Potential (if transferring to Rehab): Good    Recommended Labs or Other Treatments After Discharge:   PT/OT  [facility] Monday 3/14/2022 -call results to [address] [place] [address]      Physician Certification: I certify the above information and transfer of Adriel Light  is necessary for the continuing treatment of the diagnosis listed and that he requires [place] for less 30 days.      Update Admission H&P: No change in H&P    PHYSICIAN SIGNATURE:  Electronically signed by Ramana Wolff DO on 3/7/22 at 11:06 AM EST

## 2022-03-04 LAB
ANION GAP SERPL CALCULATED.3IONS-SCNC: 8 MMOL/L (ref 7–16)
BUN BLDV-MCNC: 18 MG/DL (ref 6–23)
CALCIUM SERPL-MCNC: 8.2 MG/DL (ref 8.6–10.2)
CHLORIDE BLD-SCNC: 107 MMOL/L (ref 98–107)
CO2: 21 MMOL/L (ref 22–29)
CREAT SERPL-MCNC: 0.6 MG/DL (ref 0.7–1.2)
GFR AFRICAN AMERICAN: >60
GFR NON-AFRICAN AMERICAN: >60 ML/MIN/1.73
GLUCOSE BLD-MCNC: 195 MG/DL (ref 74–99)
HCT VFR BLD CALC: 35.1 % (ref 37–54)
HEMOGLOBIN: 11.7 G/DL (ref 12.5–16.5)
MAGNESIUM: 1.9 MG/DL (ref 1.6–2.6)
MCH RBC QN AUTO: 30.1 PG (ref 26–35)
MCHC RBC AUTO-ENTMCNC: 33.3 % (ref 32–34.5)
MCV RBC AUTO: 90.2 FL (ref 80–99.9)
METER GLUCOSE: 196 MG/DL (ref 74–99)
METER GLUCOSE: 215 MG/DL (ref 74–99)
METER GLUCOSE: 306 MG/DL (ref 74–99)
METER GLUCOSE: 309 MG/DL (ref 74–99)
PDW BLD-RTO: 13.6 FL (ref 11.5–15)
PHOSPHORUS: 3.8 MG/DL (ref 2.5–4.5)
PLATELET # BLD: 247 E9/L (ref 130–450)
PMV BLD AUTO: 8.7 FL (ref 7–12)
POTASSIUM SERPL-SCNC: 4.3 MMOL/L (ref 3.5–5)
RBC # BLD: 3.89 E12/L (ref 3.8–5.8)
SODIUM BLD-SCNC: 136 MMOL/L (ref 132–146)
URINE CULTURE, ROUTINE: NORMAL
VDRL CSF SCREEN: NON REACTIVE
WBC # BLD: 7.9 E9/L (ref 4.5–11.5)

## 2022-03-04 PROCEDURE — 6360000002 HC RX W HCPCS: Performed by: NURSE PRACTITIONER

## 2022-03-04 PROCEDURE — 2580000003 HC RX 258: Performed by: INTERNAL MEDICINE

## 2022-03-04 PROCEDURE — 6360000002 HC RX W HCPCS: Performed by: INTERNAL MEDICINE

## 2022-03-04 PROCEDURE — 80048 BASIC METABOLIC PNL TOTAL CA: CPT

## 2022-03-04 PROCEDURE — 82962 GLUCOSE BLOOD TEST: CPT

## 2022-03-04 PROCEDURE — 6370000000 HC RX 637 (ALT 250 FOR IP): Performed by: INTERNAL MEDICINE

## 2022-03-04 PROCEDURE — 36415 COLL VENOUS BLD VENIPUNCTURE: CPT

## 2022-03-04 PROCEDURE — 83735 ASSAY OF MAGNESIUM: CPT

## 2022-03-04 PROCEDURE — 84100 ASSAY OF PHOSPHORUS: CPT

## 2022-03-04 PROCEDURE — 85027 COMPLETE CBC AUTOMATED: CPT

## 2022-03-04 PROCEDURE — 1200000000 HC SEMI PRIVATE

## 2022-03-04 RX ADMIN — INSULIN LISPRO 8 UNITS: 100 INJECTION, SOLUTION INTRAVENOUS; SUBCUTANEOUS at 12:08

## 2022-03-04 RX ADMIN — INSULIN LISPRO 4 UNITS: 100 INJECTION, SOLUTION INTRAVENOUS; SUBCUTANEOUS at 09:04

## 2022-03-04 RX ADMIN — ATORVASTATIN CALCIUM 40 MG: 40 TABLET, FILM COATED ORAL at 21:04

## 2022-03-04 RX ADMIN — SODIUM CHLORIDE AND POTASSIUM CHLORIDE: 9; 1.49 INJECTION, SOLUTION INTRAVENOUS at 07:21

## 2022-03-04 RX ADMIN — ACYCLOVIR SODIUM 650 MG: 50 INJECTION, SOLUTION INTRAVENOUS at 09:14

## 2022-03-04 RX ADMIN — THIAMINE HYDROCHLORIDE 100 MG: 100 INJECTION, SOLUTION INTRAMUSCULAR; INTRAVENOUS at 13:45

## 2022-03-04 RX ADMIN — INSULIN LISPRO 8 UNITS: 100 INJECTION, SOLUTION INTRAVENOUS; SUBCUTANEOUS at 16:46

## 2022-03-04 RX ADMIN — Medication 10 ML: at 09:11

## 2022-03-04 RX ADMIN — WATER 2000 MG: 1 INJECTION INTRAMUSCULAR; INTRAVENOUS; SUBCUTANEOUS at 01:42

## 2022-03-04 RX ADMIN — ASPIRIN 81 MG: 81 TABLET, COATED ORAL at 08:59

## 2022-03-04 RX ADMIN — INSULIN GLARGINE 6 UNITS: 100 INJECTION, SOLUTION SUBCUTANEOUS at 09:05

## 2022-03-04 RX ADMIN — SODIUM CHLORIDE AND POTASSIUM CHLORIDE: 9; 1.49 INJECTION, SOLUTION INTRAVENOUS at 18:09

## 2022-03-04 RX ADMIN — DEXAMETHASONE SODIUM PHOSPHATE 10 MG: 10 INJECTION INTRAMUSCULAR; INTRAVENOUS at 03:08

## 2022-03-04 RX ADMIN — ENOXAPARIN SODIUM 40 MG: 100 INJECTION SUBCUTANEOUS at 09:08

## 2022-03-04 RX ADMIN — THIAMINE HYDROCHLORIDE 500 MG: 100 INJECTION, SOLUTION INTRAMUSCULAR; INTRAVENOUS at 05:16

## 2022-03-04 RX ADMIN — SODIUM CHLORIDE, PRESERVATIVE FREE 10 ML: 5 INJECTION INTRAVENOUS at 09:10

## 2022-03-04 RX ADMIN — LEVETIRACETAM 500 MG: 500 TABLET, FILM COATED ORAL at 09:00

## 2022-03-04 RX ADMIN — SODIUM CHLORIDE 25 ML: 9 INJECTION, SOLUTION INTRAVENOUS at 05:15

## 2022-03-04 RX ADMIN — DEXAMETHASONE SODIUM PHOSPHATE 10 MG: 10 INJECTION INTRAMUSCULAR; INTRAVENOUS at 09:00

## 2022-03-04 RX ADMIN — METFORMIN HYDROCHLORIDE 1000 MG: 500 TABLET ORAL at 16:45

## 2022-03-04 RX ADMIN — SODIUM CHLORIDE AND POTASSIUM CHLORIDE: 9; 1.49 INJECTION, SOLUTION INTRAVENOUS at 13:48

## 2022-03-04 RX ADMIN — SODIUM CHLORIDE AND POTASSIUM CHLORIDE: 9; 1.49 INJECTION, SOLUTION INTRAVENOUS at 21:15

## 2022-03-04 RX ADMIN — SODIUM CHLORIDE AND POTASSIUM CHLORIDE: 9; 1.49 INJECTION, SOLUTION INTRAVENOUS at 00:35

## 2022-03-04 RX ADMIN — THIAMINE HYDROCHLORIDE 100 MG: 100 INJECTION, SOLUTION INTRAMUSCULAR; INTRAVENOUS at 21:18

## 2022-03-04 RX ADMIN — ACYCLOVIR SODIUM 650 MG: 50 INJECTION, SOLUTION INTRAVENOUS at 00:37

## 2022-03-04 RX ADMIN — ACYCLOVIR SODIUM 650 MG: 50 INJECTION, SOLUTION INTRAVENOUS at 16:50

## 2022-03-04 RX ADMIN — ALOGLIPTIN 12.5 MG: 12.5 TABLET, FILM COATED ORAL at 08:59

## 2022-03-04 RX ADMIN — LEVETIRACETAM 500 MG: 500 TABLET, FILM COATED ORAL at 21:04

## 2022-03-04 ASSESSMENT — PAIN SCALES - GENERAL
PAINLEVEL_OUTOF10: 0

## 2022-03-04 NOTE — PROGRESS NOTES
Patient alert oriented not agitated, yet very restless with hypertension. Noted watching international nuclear bombing coverage on TV, voiced local safety concerns  several times, 1:1 and diversion to try to calm, pt agreed to change TV program, stated \"I don't know if I'll be able to sleep with all of this going on\", PRN ativan given once, does seem to be helping at this time, patient quiet, calm resting in bed.

## 2022-03-04 NOTE — PROGRESS NOTES
[facility]  [facility]  Progress Note    CHIEF COMPLAINT     ID following for   Chief Complaint   Patient presents with    Fall    Seizures     HISTORY OF PRESENT ILLNESS   Pt seen and examined  03/04/22   In bed   Sitter present   Awake and alert, watching TV  Note oriented, still confused  On room air   Tolerating medication, no side effects     REVIEW OF SYSTEMS     As stated above in the chief complaint, otherwise negative.   CURRENT MEDICATIONS      levETIRAcetam  500 mg Oral BID    insulin lispro  0-12 Units SubCUTAneous 4x Daily AC & HS    atorvastatin  40 mg Oral Nightly    acyclovir  10 mg/kg (Ideal) IntraVENous Q8H    dexamethasone  10 mg IntraVENous Q6H    cefTRIAXone (ROCEPHIN) IV  2,000 mg IntraVENous Q12H    insulin glargine  6 Units SubCUTAneous Daily    thiamine (VITAMIN B1) IVPB  100 mg IntraVENous Q8H    sodium chloride flush  5-40 mL IntraVENous 2 times per day    sodium chloride flush  5-40 mL IntraVENous 2 times per day    aspirin  81 mg Oral Daily    alogliptin  12.5 mg Oral Daily    metFORMIN  1,000 mg Oral BID WC    enoxaparin  40 mg SubCUTAneous Daily     Continuous Infusions:   sodium chloride      sodium chloride Stopped (03/04/22 0600)    0.9% NaCl with KCl 20 mEq 150 mL/hr at 03/04/22 0721    dextrose      dextrose 5 % and 0.45 % NaCl      dextrose       PRN Meds:sodium chloride flush, sodium chloride, LORazepam, sodium chloride flush, sodium chloride, LORazepam **OR** LORazepam **OR** LORazepam **OR** LORazepam **OR** LORazepam **OR** LORazepam **OR** LORazepam **OR** LORazepam, acetaminophen, acetaminophen, polyethylene glycol, dextrose, dextrose 5 % and 0.45 % NaCl, glucose, glucagon (rDNA), dextrose, dextrose bolus (hypoglycemia) **OR** dextrose bolus (hypoglycemia)    PHYSICAL EXAM     BP (!) 160/65   Pulse 83   Temp 97.6 °F (36.4 °C) (Oral)   Resp 18   Ht 5' 6\" (1.676 m)   Wt 145 lb 14.4 oz (66.2 kg)   SpO2 96%   BMI 23.55 kg/m²   Temp  Av °F (36.1 °C)  Min: 96.3 °F (35.7 °C)  Max: 97.6 °F (36.4 °C)  Constitutional:  The patient is awake, alert, not oriented   Skin:      Rash noted - no change   HEENT:   Round and reactive pupils. AT/NC  Neck:    Supple to movements. Chest:   No use of accessory muscles to breathe. Symmetrical expansion. Cardiovascular:  S1 and S2 are rhythmic and regular. No murmurs appreciated. Abdomen:   Positive bowel sounds to auscultation. Benign to palpation. distended  Extremities:   No clubbing, no cyanosis, no edema. CNS    Awake   Lines: piv      DIAGNOSTIC RESULTS   Radiology:    Recent Labs     22  0637 22  0459 22  0525   WBC 13.5* 12.0* 7.9   RBC 4.41 4.20 3.89   HGB 13.0 12.8 11.7*   HCT 40.8 38.7 35.1*   MCV 92.5 92.1 90.2   MCH 29.5 30.5 30.1   MCHC 31.9* 33.1 33.3   RDW 13.6 13.8 13.6    286 247   MPV 8.5 8.8 8.7     Recent Labs     22  0510 22  0459 22  0525    137 136   K 4.4 4.4 4.3    107 107   CO2 18* 20* 21*   BUN 15 19 18   CREATININE 0.5* 0.6* 0.6*   GLUCOSE 190* 203* 195*   CALCIUM 8.6 8.6 8.2*     Lab Results   Component Value Date    CRP 1.4 (H) 2022     Lab Results   Component Value Date    SEDRATE 21 (H) 2022     No results for input(s): CRP, PROCAL, FERRITIN, LDH, TROPONINI, DDIMER, FIBRINOGEN, INR, PROTIME, AST, ALT, TRIG in the last 72 hours.   Lab Results   Component Value Date    CHOL 205 2022    TRIG 104 2022    HDL 59 2022    LDLCALC 125 2022    LABVLDL 21 2022     Microbiology:   hsv csf pending  Csf cx ngtd  meningitis encephalitis panel neg     FINAL IMPRESSION    Pt had   Chief Complaint   Patient presents with    Fall    Seizures    Admitted for Infestation by bed bug [B88.8]  Seizure ([facility] [R56.9]  Altered mental status [R41.82]  Altered mental status, unspecified altered mental status type [R41.82]  Hyperosmolar hyperglycemic state (HHS) ([facility] [E11.00, E11.65]  On treatment for   Leukocytosis tredning down   eval for CNS infection - CSF NGTD, WBC 3  Has Small chronic right frontal lobe infarction.  Multiple chronic lacunar   Infarctions  Alcohol withdrawal    acyclovir (ZOVIRAX) 650 mg in dextrose 5 % 100 mL IVPB, Q8H  DC Decadron and Ceftriaxone     Labs, cultures, imaging reviewed       Electronically signed by ZEKE Campos NP on 3/4/2022 at 10:34 AM     Patient examined along with the nurse practitioner  Agree with above  Confusion resolved patient clinically doing much better  Doubt this is a bacterial pneumonia CSF WBCs only 3  We will discontinue ceftriaxone as well as Decadron  Continue patient on acyclovir awaiting CSF PCR for HSV    Discussed at length with intensivist    I have discussed the case, including pertinent history and physical  exam findings . I have seen and examined the patient and the key elements of the encounter have been performed by me. I agree with the assessment, plan and orders as documented.       Treatment plan as per my recommendation     Michael Bermudez MD, FACP  3/4/2022  11:02 PM

## 2022-03-04 NOTE — PROGRESS NOTES
Department of Internal Medicine        CHIEF COMPLAINT: Altered mental status, seizures    Reason for Admission: Altered mental status, seizure, hypoglycemia    HISTORY OF PRESENT ILLNESS:      The patient is a 78 y.o. male who presents with being brought in by paramedics. Apparently patient's neighbors called EMS when he heard the patient screaming and when they found him he was unresponsive. Paramedics came to check blood sugar which was reading high. There is no evidence of any trauma at the scene. Patient was incontinent of urine. The patient had tonic-clonic seizure in the ED and was treated with Ativan and Keppra. CT the head did not show any acute intracranial abnormality. Patient had a random blood sugar of 573 on admission. Liver enzymes essentially normal with a negative drug screen and a WBC 7.1 hemoglobin 13.6.    3/1/2022  Patient seen examined on ICU. The patient is much more alert and oriented currently than he was yesterday. Patient is oriented to person. Patient still weak with very poor memory. He denies any chest or abdominal pain. He denies any headaches or blurred vision. BUN/creatinine 12/0.6. Blood sugars ranging 200s. Hemoglobin A1c was 12.3. WBC 13.8 with hemoglobin 13.3. Sed rate is 21. Temperature 98.1 with heart rate 83 and blood pressure 150/69. O2 sat 98% on room air at rest.  Urine output is very good. Case discussed with Dr. Anabella Champion today. With this patient's altered mental status even though it has improved we are attempting to have IR to do a lumbar puncture. I think with the patient's presentation it would be a very good idea to have this done. 3/2/2022  Patient seen examined on ICU. Patient is a little bit more alert and oriented again today. Patient still very very weak. Patient still has somewhat of a flat affect. Patient does deny any type of chest, abdominal pain. Denies any significant unilateral weakness.   MRI of the brain showed just old infarcts. The lumbar puncture though did show an increase in protein. BUN/creatinine 15/0.5. Blood sugars ranging 141190. WBC 13.5 hemoglobin 13. Temperature is 97.8 with heart rate is 68 blood pressure 160/55. O2 sat 97% on 2 L nasal cannula. Urine output is good. 3/3/2022  Patient seen examined in ICU. Patient is much more alert today. Patient states he feels great and he denies any problem chest pain, abdominal pain, nausea vomiting. Patient denies problem with headaches or blurred vision. Patient also denies any history of daily alcohol abuse or any significant binge drinking. BUN/creatinine 19/0.6, blood sugars ranging [id number]. WBC is 12 with hemoglobin 12.8. Temperature 97.7 with heart rate 89 blood pressure 142/60. O2 sat 97% room air at rest.  Urine output ranges 550-1400 cc a shift. EEG reviewed with no evidence of active seizure. 3/4/2022  Patient seen examined in ICU. Patient again is little bit more alert and oriented today. Patient is oriented to person place. Patient has one-on-one sitter and she states the patient is not confused this morning and did eat most of his breakfast.  BUN/creatinine 18/0.6 with patient's blood sugars ranging 195490. Hemoglobin 11.7 WBC 7.9. Temperature 97.6 heart rate 83 and blood pressure 160/65. O2 sat 96% room air at rest.  Urine output was good ranging 550-1900 cc a shift. Neurology, intensivist, ID notes reviewed. Past Medical History:    Past Medical History:   Diagnosis Date    Type 2 diabetes mellitus with hyperglycemia, without long-term current use of insulin ([facility] [address]      Past Surgical History:    History reviewed. No pertinent surgical history. Medications Prior to Admission:    @  Prior to Admission medications    Medication Sig Start Date End Date Taking?  Authorizing Provider   aspirin 81 MG EC tablet Take 1 tablet by mouth daily 7/27/21   Basia Piña, DO   alogliptin (NESINA) 12.5 MG TABS tablet Take 1 tablet by mouth daily 7/27/21   Ralf García, DO   insulin lispro (HUMALOG) 100 UNIT/ML injection vial Inject 0-3 Units into the skin nightly 7/26/21   Arambula Ricky, DO   insulin lispro (HUMALOG) 100 UNIT/ML injection vial Inject 0-6 Units into the skin 3 times daily (with meals) 7/26/21   Ralf García, DO   metFORMIN (GLUCOPHAGE) 1000 MG tablet Take 1 tablet by mouth 2 times daily (with meals) 7/26/21   Ralf García, DO   atorvastatin (LIPITOR) 20 MG tablet Take 1 tablet by mouth nightly 7/26/21   Ralf García, DO   lisinopril (PRINIVIL;ZESTRIL) 10 MG tablet Take 1 tablet by mouth daily 7/27/21   Ralf García, DO   thiamine mononitrate (THIAMINE) 100 MG tablet Take 1 tablet by mouth daily 7/27/21   Ralf García, DO       Allergies:  Patient has no known allergies. Social History:   Social History     Socioeconomic History    Marital status:      Spouse name: Not on file    Number of children: Not on file    Years of education: Not on file    Highest education level: Not on file   Occupational History    Not on file   Tobacco Use    Smoking status: Never Smoker    Smokeless tobacco: Never Used   Substance and Sexual Activity    Alcohol use: Not on file    Drug use: Not on file    Sexual activity: Not on file   Other Topics Concern    Not on file   Social History Narrative    Not on file     Social Determinants of Health     Financial Resource Strain:     Difficulty of Paying Living Expenses: Not on file   Food Insecurity:     Worried About Running Out of Food in the Last Year: Not on file    Cora of Food in the Last Year: Not on file   Transportation Needs:     Lack of Transportation (Medical): Not on file    Lack of Transportation (Non-Medical):  Not on file   Physical Activity:     Days of Exercise per Week: Not on file    Minutes of Exercise per Session: Not on file   Stress:     Feeling of Stress : Not on file   Social Connections:     Frequency of Communication with Friends and Family: Not on file    Frequency of Social Gatherings with Friends and Family: Not on file    Attends Religion Services: Not on file    Active Member of Clubs or Organizations: Not on file    Attends Club or Organization Meetings: Not on file    Marital Status: Not on file   Intimate Partner Violence:     Fear of Current or Ex-Partner: Not on file    Emotionally Abused: Not on file    Physically Abused: Not on file    Sexually Abused: Not on file   Housing Stability:     Unable to Pay for Housing in the Last Year: Not on file    Number of [place] in the Last Year: Not on file    Unstable Housing in the Last Year: Not on file       Family History:   No family history on file. REVIEW OF SYSTEMS: Unable to get information secondary to patient's current condition. Gen: Patient denies any lightheadedness or dizziness. No LOC or syncope. No fevers or chills. HEENT: No earache, sore throat or nasal congestion. Resp: Denies cough, hemoptysis or sputum production. Cardiac: Denies chest pain, SOB, diaphoresis or palpitations. GI: No nausea, vomiting, diarrhea or constipation. No melena or hematochezia. : No urinary complaints, dysuria, hematuria or frequency. MSK: No extremity weakness, paralysis or paresthesias. PHYSICAL EXAM:    Vitals:  BP (!) 160/65   Pulse 83   Temp 97.6 °F (36.4 °C) (Oral)   Resp 18   Ht 5' 6\" (1.676 m)   Wt 145 lb 14.4 oz (66.2 kg)   SpO2 96%   BMI 23.55 kg/m²     General:  This is a 78 y.o. yo male who is unresponsive to verbal or painful stimuli at this time. HEENT:  Head is normocephalic and atraumatic, PERRLA, EOMI, mucus membranes dry with no pharyngeal erythema or exudate. Neck:  Supple with no carotid bruits, JVD or thyromegaly.   No cervical adenopathy  CV:  Regular rate and rhythm, 2/6 systolic murmurs  Lungs:  Clear to auscultation bilaterally with no wheezes, rales or rhonchi  Abdomen:  Soft, nontender, nondistended, bowel sounds present  Extremities:  No edema, peripheral pulses intact bilaterally  Neuro: Patient unresponsive to verbal or painful stimuli  Skin:  No rashes, lesions or wounds    DATA:  CBC with Differential:    Lab Results   Component Value Date    WBC 7.9 03/04/2022    RBC 3.89 03/04/2022    HGB 11.7 03/04/2022    HCT 35.1 03/04/2022     03/04/2022    MCV 90.2 03/04/2022    MCH 30.1 03/04/2022    MCHC 33.3 03/04/2022    RDW 13.6 03/04/2022    SEGSPCT 66 04/28/2011    LYMPHOPCT 2.0 03/02/2022    MONOPCT 3.0 03/02/2022    BASOPCT 0.0 03/02/2022    MONOSABS 0.40 03/02/2022    LYMPHSABS 0.27 03/02/2022    EOSABS 0.00 03/02/2022    BASOSABS 0.00 03/02/2022     CMP:    Lab Results   Component Value Date     03/04/2022    K 4.3 03/04/2022    K 3.9 07/22/2021     03/04/2022    CO2 21 03/04/2022    BUN 18 03/04/2022    CREATININE 0.6 03/04/2022    GFRAA >60 03/04/2022    LABGLOM >60 03/04/2022    GLUCOSE 195 03/04/2022    GLUCOSE 142 04/29/2011    PROT 6.5 03/01/2022    LABALBU 3.8 03/01/2022    CALCIUM 8.2 03/04/2022    BILITOT 0.3 03/01/2022    ALKPHOS 113 03/01/2022    AST 39 03/01/2022    ALT 16 03/01/2022     Magnesium:    Lab Results   Component Value Date    MG 1.9 03/04/2022     Phosphorus:    Lab Results   Component Value Date    PHOS 3.8 03/04/2022     PT/INR:    Lab Results   Component Value Date    PROTIME 11.5 03/01/2022    PROTIME 13.2 04/29/2011    INR 1.0 03/01/2022     Troponin:  No results found for: TROPONINI  U/A:    Lab Results   Component Value Date    COLORU Straw 02/28/2022    PROTEINU 30 02/28/2022    PHUR 7.0 02/28/2022    WBCUA 1-3 02/28/2022    RBCUA 0-1 02/28/2022    BACTERIA RARE 02/28/2022    CLARITYU Clear 02/28/2022    SPECGRAV 1.015 02/28/2022    LEUKOCYTESUR Negative 02/28/2022    UROBILINOGEN 0.2 02/28/2022    BILIRUBINUR Negative 02/28/2022    BLOODU TRACE 02/28/2022    GLUCOSEU >=1000 02/28/2022     ABG:    Lab Results   Component Value Date    PH 7.387 02/28/2022 PCO2 40.5 02/28/2022    PO2 116.9 02/28/2022    HCO3 23.8 02/28/2022    BE -1.1 02/28/2022    O2SAT 97.7 02/28/2022     HgBA1c:    Lab Results   Component Value Date    LABA1C 12.3 02/28/2022     FLP:    Lab Results   Component Value Date    TRIG 104 03/01/2022    HDL 59 03/01/2022    LDLCALC 125 03/01/2022    LABVLDL 21 03/01/2022     TSH:    Lab Results   Component Value Date    TSH 2.130 02/28/2022     IRON:  No results found for: IRON  LIPASE:  No results found for: LIPASE    ASSESSMENT AND PLAN:      Patient Active Problem List    Diagnosis Date Noted    Hyperosmolar hyperglycemic state (HHS) ([facility] [address] 02/28/2022    New onset seizure ([facility] [address] 02/28/2022    Altered mental status 02/28/2022    Delirium 07/22/2021     Impression:  1. Altered mental status -probable metabolic encephalopathy  2. Noninsulin dependent diabetes mellitus type 2hyperglycemic-hemoglobin A1c 12.3 with resolving hyperosmolar state  3. New onset tonic-clonic seizure  4. 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 History of medical noncompliance  6. History of old lacunar infarcts  7. Hypertension  8. History of spinal stenosis    Plan:  Home medications reviewed  IV fluids normal saline 20 KCl 100 cc an hour  Glucoscans 4 times daily with sliding scale insulin  Ativan 1 mg as needed for seizure  Consult neurology  Keppra 500 mg IV piggyback every 12 hours  Possible MRI of the brain if no improvement in mental status  Lovenox 40 mg subcu daily  Consult PT/OT  Infectious disease consult    Transfer to monitored bed when okay with intensivist    CMP, CBC in a.m.       Antonino Rawls DO, D.O.  3/4/2022  9:20 AM

## 2022-03-04 NOTE — CARE COORDINATION
SOCIAL WORK / DISCHARGE PLANNING:  WILL NEED RAPID COVID TEST PRIOR TO DC. Dc plan to [facility] when medically stable. NO PRECERT needed. PAYAM will need signed by physician. HENS initiated, will need completed when dc order obtained.                  Electronically signed by SITA Angulo on 3/4/2022 at 10:59 AM

## 2022-03-05 LAB
ANION GAP SERPL CALCULATED.3IONS-SCNC: 13 MMOL/L (ref 7–16)
BUN BLDV-MCNC: 18 MG/DL (ref 6–23)
CALCIUM SERPL-MCNC: 8.2 MG/DL (ref 8.6–10.2)
CHLORIDE BLD-SCNC: 100 MMOL/L (ref 98–107)
CO2: 22 MMOL/L (ref 22–29)
CREAT SERPL-MCNC: 0.8 MG/DL (ref 0.7–1.2)
CSF CULTURE: NORMAL
GFR AFRICAN AMERICAN: >60
GFR NON-AFRICAN AMERICAN: >60 ML/MIN/1.73
GLUCOSE BLD-MCNC: 236 MG/DL (ref 74–99)
GRAM STAIN RESULT: NORMAL
HCT VFR BLD CALC: 38.3 % (ref 37–54)
HEMOGLOBIN: 12.6 G/DL (ref 12.5–16.5)
Lab: NORMAL
MAGNESIUM: 1.8 MG/DL (ref 1.6–2.6)
MCH RBC QN AUTO: 29.9 PG (ref 26–35)
MCHC RBC AUTO-ENTMCNC: 32.9 % (ref 32–34.5)
MCV RBC AUTO: 90.8 FL (ref 80–99.9)
METER GLUCOSE: 127 MG/DL (ref 74–99)
METER GLUCOSE: 157 MG/DL (ref 74–99)
METER GLUCOSE: 184 MG/DL (ref 74–99)
METER GLUCOSE: 188 MG/DL (ref 74–99)
PDW BLD-RTO: 13.9 FL (ref 11.5–15)
PHOSPHORUS: 4.2 MG/DL (ref 2.5–4.5)
PLATELET # BLD: 284 E9/L (ref 130–450)
PMV BLD AUTO: 8.8 FL (ref 7–12)
POTASSIUM SERPL-SCNC: 4 MMOL/L (ref 3.5–5)
RBC # BLD: 4.22 E12/L (ref 3.8–5.8)
REPORT: NORMAL
SODIUM BLD-SCNC: 135 MMOL/L (ref 132–146)
THIS TEST SENT TO: NORMAL
WBC # BLD: 7.8 E9/L (ref 4.5–11.5)

## 2022-03-05 PROCEDURE — 6370000000 HC RX 637 (ALT 250 FOR IP): Performed by: INTERNAL MEDICINE

## 2022-03-05 PROCEDURE — 6360000002 HC RX W HCPCS: Performed by: INTERNAL MEDICINE

## 2022-03-05 PROCEDURE — 36415 COLL VENOUS BLD VENIPUNCTURE: CPT

## 2022-03-05 PROCEDURE — 2580000003 HC RX 258: Performed by: INTERNAL MEDICINE

## 2022-03-05 PROCEDURE — 84100 ASSAY OF PHOSPHORUS: CPT

## 2022-03-05 PROCEDURE — 82962 GLUCOSE BLOOD TEST: CPT

## 2022-03-05 PROCEDURE — 1200000000 HC SEMI PRIVATE

## 2022-03-05 PROCEDURE — 85027 COMPLETE CBC AUTOMATED: CPT

## 2022-03-05 PROCEDURE — 83735 ASSAY OF MAGNESIUM: CPT

## 2022-03-05 PROCEDURE — 80048 BASIC METABOLIC PNL TOTAL CA: CPT

## 2022-03-05 RX ORDER — LISINOPRIL 10 MG/1
10 TABLET ORAL DAILY
Status: DISCONTINUED | OUTPATIENT
Start: 2022-03-05 | End: 2022-03-08

## 2022-03-05 RX ADMIN — ATORVASTATIN CALCIUM 40 MG: 40 TABLET, FILM COATED ORAL at 20:34

## 2022-03-05 RX ADMIN — Medication 10 ML: at 10:04

## 2022-03-05 RX ADMIN — ACYCLOVIR SODIUM 650 MG: 50 INJECTION, SOLUTION INTRAVENOUS at 00:39

## 2022-03-05 RX ADMIN — ASPIRIN 81 MG: 81 TABLET, COATED ORAL at 10:02

## 2022-03-05 RX ADMIN — ALOGLIPTIN 12.5 MG: 12.5 TABLET, FILM COATED ORAL at 12:20

## 2022-03-05 RX ADMIN — SODIUM CHLORIDE, PRESERVATIVE FREE 10 ML: 5 INJECTION INTRAVENOUS at 10:02

## 2022-03-05 RX ADMIN — SODIUM CHLORIDE AND POTASSIUM CHLORIDE: 9; 1.49 INJECTION, SOLUTION INTRAVENOUS at 17:10

## 2022-03-05 RX ADMIN — INSULIN LISPRO 2 UNITS: 100 INJECTION, SOLUTION INTRAVENOUS; SUBCUTANEOUS at 17:10

## 2022-03-05 RX ADMIN — THIAMINE HYDROCHLORIDE 100 MG: 100 INJECTION, SOLUTION INTRAMUSCULAR; INTRAVENOUS at 04:54

## 2022-03-05 RX ADMIN — SODIUM CHLORIDE AND POTASSIUM CHLORIDE: 9; 1.49 INJECTION, SOLUTION INTRAVENOUS at 04:50

## 2022-03-05 RX ADMIN — INSULIN LISPRO 2 UNITS: 100 INJECTION, SOLUTION INTRAVENOUS; SUBCUTANEOUS at 20:37

## 2022-03-05 RX ADMIN — INSULIN GLARGINE 6 UNITS: 100 INJECTION, SOLUTION SUBCUTANEOUS at 10:04

## 2022-03-05 RX ADMIN — ACYCLOVIR SODIUM 650 MG: 50 INJECTION, SOLUTION INTRAVENOUS at 10:48

## 2022-03-05 RX ADMIN — ACYCLOVIR SODIUM 650 MG: 50 INJECTION, SOLUTION INTRAVENOUS at 17:09

## 2022-03-05 RX ADMIN — THIAMINE HYDROCHLORIDE 100 MG: 100 INJECTION, SOLUTION INTRAMUSCULAR; INTRAVENOUS at 12:22

## 2022-03-05 RX ADMIN — LEVETIRACETAM 500 MG: 500 TABLET, FILM COATED ORAL at 10:02

## 2022-03-05 RX ADMIN — METFORMIN HYDROCHLORIDE 1000 MG: 500 TABLET ORAL at 10:02

## 2022-03-05 RX ADMIN — ENOXAPARIN SODIUM 40 MG: 100 INJECTION SUBCUTANEOUS at 10:02

## 2022-03-05 RX ADMIN — INSULIN LISPRO 2 UNITS: 100 INJECTION, SOLUTION INTRAVENOUS; SUBCUTANEOUS at 11:56

## 2022-03-05 RX ADMIN — LEVETIRACETAM 500 MG: 500 TABLET, FILM COATED ORAL at 20:34

## 2022-03-05 RX ADMIN — LISINOPRIL 10 MG: 10 TABLET ORAL at 19:00

## 2022-03-05 RX ADMIN — THIAMINE HYDROCHLORIDE 100 MG: 100 INJECTION, SOLUTION INTRAMUSCULAR; INTRAVENOUS at 20:35

## 2022-03-05 RX ADMIN — METFORMIN HYDROCHLORIDE 1000 MG: 500 TABLET ORAL at 17:10

## 2022-03-05 ASSESSMENT — PAIN SCALES - GENERAL
PAINLEVEL_OUTOF10: 0
PAINLEVEL_OUTOF10: 0

## 2022-03-05 NOTE — PROGRESS NOTES
[facility]  [facility]  Progress Note    CHIEF COMPLAINT     ID following for   Chief Complaint   Patient presents with    Fall    Seizures     HISTORY OF PRESENT ILLNESS   Pt seen and examined  22   Awake and alert,  mildly confused  On room air   Tolerating medication, no side effects     REVIEW OF SYSTEMS     As stated above in the chief complaint, otherwise negative. CURRENT MEDICATIONS      levETIRAcetam  500 mg Oral BID    insulin lispro  0-12 Units SubCUTAneous 4x Daily AC & HS    atorvastatin  40 mg Oral Nightly    acyclovir  10 mg/kg (Ideal) IntraVENous Q8H    insulin glargine  6 Units SubCUTAneous Daily    thiamine (VITAMIN B1) IVPB  100 mg IntraVENous Q8H    sodium chloride flush  5-40 mL IntraVENous 2 times per day    sodium chloride flush  5-40 mL IntraVENous 2 times per day    aspirin  81 mg Oral Daily    alogliptin  12.5 mg Oral Daily    metFORMIN  1,000 mg Oral BID WC    enoxaparin  40 mg SubCUTAneous Daily     Continuous Infusions:   sodium chloride      sodium chloride Stopped (22 0600)    0.9% NaCl with KCl 20 mEq 50 mL/hr at 22 1001    dextrose      dextrose 5 % and 0.45 % NaCl      dextrose       PRN Meds:sodium chloride flush, sodium chloride, LORazepam, sodium chloride flush, sodium chloride, acetaminophen, acetaminophen, polyethylene glycol, dextrose, dextrose 5 % and 0.45 % NaCl, glucose, glucagon (rDNA), dextrose, dextrose bolus (hypoglycemia) **OR** dextrose bolus (hypoglycemia)    PHYSICAL EXAM     BP (!) 189/82   Pulse 71   Temp 97.7 °F (36.5 °C) (Oral)   Resp 16   Ht 5' 6\" (1.676 m)   Wt 148 lb (67.1 kg)   SpO2 98%   BMI 23.89 kg/m²   Temp  Av.2 °F (36.8 °C)  Min: 97.7 °F (36.5 °C)  Max: 98.5 °F (36.9 °C)  Constitutional:  The patient is awake, alert, not oriented   Skin:      Rash noted - no change   HEENT:   Round and reactive pupils. AT/NC  Neck:    Supple to movements.    Chest:   No use of accessory muscles to breathe. Symmetrical expansion. Cardiovascular:  S1 and S2 are rhythmic and regular. No murmurs appreciated. Abdomen:   Positive bowel sounds to auscultation. Benign to palpation. distended  Extremities:   No clubbing, no cyanosis, no edema. CNS    Awake   Lines: piv      DIAGNOSTIC RESULTS   Radiology:    Recent Labs     03/03/22  0459 03/04/22  0525   WBC 12.0* 7.9   RBC 4.20 3.89   HGB 12.8 11.7*   HCT 38.7 35.1*   MCV 92.1 90.2   MCH 30.5 30.1   MCHC 33.1 33.3   RDW 13.8 13.6    247   MPV 8.8 8.7     Recent Labs     03/03/22  0459 03/04/22  0525    136   K 4.4 4.3    107   CO2 20* 21*   BUN 19 18   CREATININE 0.6* 0.6*   GLUCOSE 203* 195*   CALCIUM 8.6 8.2*     Lab Results   Component Value Date    CRP 1.4 (H) 03/01/2022     Lab Results   Component Value Date    SEDRATE 21 (H) 03/01/2022     No results for input(s): CRP, PROCAL, FERRITIN, LDH, TROPONINI, DDIMER, FIBRINOGEN, INR, PROTIME, AST, ALT, TRIG in the last 72 hours.   Lab Results   Component Value Date    CHOL 205 03/01/2022    TRIG 104 03/01/2022    HDL 59 03/01/2022    LDLCALC 125 03/01/2022    LABVLDL 21 03/01/2022     Microbiology:   hsv csf pending  Csf cx ngtd  meningitis encephalitis panel neg     FINAL IMPRESSION    Pt had   Chief Complaint   Patient presents with    Fall    Seizures    Admitted for Infestation by bed bug [B88.8]  Seizure ([facility] [address] [R56.9]  Altered mental status [R41.82]  Altered mental status, unspecified altered mental status type [R41.82]  Hyperosmolar hyperglycemic state (HHS) ([facility] [address] [E11.00, E11.65]  On treatment for   Leukocytosis tredning down   eval for CNS infection - CSF NGTD, WBC 3  Has Small chronic right frontal lobe infarction.  Multiple chronic lacunar   Infarctions  Alcohol withdrawal    acyclovir (ZOVIRAX) 650 mg in dextrose 5 % 100 mL IVPB, Q8H    Labs, cultures, imaging reviewed         Bruno Tristin, MD, FACP  3/5/2022  1:32 PM

## 2022-03-05 NOTE — PROGRESS NOTES
Internal Medicine Progress Note    [facility]    Guzmánlaurence Camargo. Tiffany Hayes., SIRISHA Fonseca D.O., JANIE Olvera, MSN, APRN, NP-C  Stephenie De Leon. Montserrat Foots, MSN, APRN-CNP     Primary Care Physician: Scot Eubanks MD   Admitting Physician:  Trever Aviita DO  Admission date and time: 2/28/2022  9:48 AM    Room:  [address] [place] [address]  Admitting diagnosis: Infestation by bed bug [B88.8]  Seizure ([facility] [address] [R56.9]  Altered mental status [R41.82]  Altered mental status, unspecified altered mental status type [R41.82]  Hyperosmolar hyperglycemic state (HHS) ([facility] [address] [E11.00, E11.65]    Patient Name: Teofilo Hammond  MRN: [id number]    Date of Service: 3/5/2022     Covering for Dr. Catherine Govea:  Vinayak Valadez is a 78 y.o. male who was seen and examined today,3/5/2022, at the bedside. Patient is alert and responsive but very confused. States he did not sleep well last night. Appetite is good. Denies chest pain or shortness of breath    No family present during my examination. Review of System:   Constitutional:   Denies fever or chills, weight loss or gain, fatigue or malaise. HEENT:   Denies ear pain, sore throat, sinus or eye problems. Cardiovascular:   Denies any chest pain, irregular heartbeats, or palpitations. Respiratory:   Denies shortness of breath, coughing, sputum production, hemoptysis, or wheezing. Gastrointestinal:   Denies nausea, vomiting, diarrhea, or constipation. Denies any abdominal pain. Genitourinary:    Denies any urgency, frequency, hematuria. Voiding  without difficulty. Extremities:   Denies lower extremity swelling, edema or cyanosis. Neurology:    Patient confused. Moving all extremities. No further seizures  Psch:   Denies being anxious or depressed. Musculoskeletal:    Denies  myalgias, joint complaints or back pain. Integumentary:   Denies any rashes, ulcers, or excoriations.   Denies bruising. Hematologic/Lymphatic:  Denies bruising or bleeding. Physical Exam:  I/O this shift:  In: 7262 [P.O.:240; I.V.:2331.3; IV Piggyback:474.7]  Out: 3275 [Urine:3275]    Intake/Output Summary (Last 24 hours) at 3/5/2022 1645  Last data filed at 3/5/2022 1444  Gross per 24 hour   Intake 3557.98 ml   Output 9175 ml   Net -5617.02 ml   I/O last 3 completed shifts: In: 4957.4 [P.O.:960; I.V.:3345.9; IV Piggyback:651.5]  Out: 97330 [Urine:14896]  Patient Vitals for the past 96 hrs (Last 3 readings):   Weight   03/05/22 0551 148 lb (67.1 kg)   03/04/22 0600 145 lb 14.4 oz (66.2 kg)     Vital Signs:   Blood pressure 134/68, pulse 89, temperature 97.7 °F (36.5 °C), temperature source Oral, resp. rate 16, height 5' 6\" (1.676 m), weight 148 lb (67.1 kg), SpO2 98 %. General appearance:  Patient alert to person only  Head:  Normocephalic. No masses, lesions or tenderness. Eyes:  PERRLA. EOMI. Sclera clear. Buccal mucosa moist.  ENT:  Ears normal. Mucosa normal.  Neck:    Supple. Trachea midline. No thyromegaly. No JVD. No bruits. Heart:    Rhythm regular. Rate controlled. No murmurs. Lungs:    Symmetrical. Clear to auscultation bilaterally. No wheezes. No rhonchi. No rales. Abdomen:   Soft. Non-tender. Non-distended. Bowel sounds positive. No organomegaly or masses. No pain on palpation. Extremities:    Peripheral pulses present. No peripheral edema. No ulcers. No cyanosis. No clubbing. Neurologic:    Alert to person only. No focal deficit. Cranial nerves grossly intact. No focal weakness. Psych:   Behavior is normal. Mood appears normal. Speech is not rapid and/or pressured. Musculoskeletal:   Spine ROM normal. Muscular strength intact. Gait not assessed. Integumentary:  No rashes  Skin normal color and texture.   Genitalia/Breast:  Deferred    Medication:  Scheduled Meds:   levETIRAcetam  500 mg Oral BID    insulin lispro  0-12 Units SubCUTAneous 4x Daily AC & HS    atorvastatin  40 mg Oral Nightly    acyclovir  10 mg/kg (Ideal) IntraVENous Q8H    insulin glargine  6 Units SubCUTAneous Daily    thiamine (VITAMIN B1) IVPB  100 mg IntraVENous Q8H    sodium chloride flush  5-40 mL IntraVENous 2 times per day    sodium chloride flush  5-40 mL IntraVENous 2 times per day    aspirin  81 mg Oral Daily    alogliptin  12.5 mg Oral Daily    metFORMIN  1,000 mg Oral BID WC    enoxaparin  40 mg SubCUTAneous Daily     Continuous Infusions:   sodium chloride      sodium chloride Stopped (03/04/22 0600)    0.9% NaCl with KCl 20 mEq 50 mL/hr at 03/05/22 1423    dextrose      dextrose 5 % and 0.45 % NaCl      dextrose         Objective Data:  CBC with Differential:    Lab Results   Component Value Date    WBC 7.9 03/04/2022    RBC 3.89 03/04/2022    HGB 11.7 03/04/2022    HCT 35.1 03/04/2022     03/04/2022    MCV 90.2 03/04/2022    MCH 30.1 03/04/2022    MCHC 33.3 03/04/2022    RDW 13.6 03/04/2022    SEGSPCT 66 04/28/2011    LYMPHOPCT 2.0 03/02/2022    MONOPCT 3.0 03/02/2022    BASOPCT 0.0 03/02/2022    MONOSABS 0.40 03/02/2022    LYMPHSABS 0.27 03/02/2022    EOSABS 0.00 03/02/2022    BASOSABS 0.00 03/02/2022     CMP:    Lab Results   Component Value Date     03/04/2022    K 4.3 03/04/2022    K 3.9 07/22/2021     03/04/2022    CO2 21 03/04/2022    BUN 18 03/04/2022    CREATININE 0.6 03/04/2022    GFRAA >60 03/04/2022    LABGLOM >60 03/04/2022    GLUCOSE 195 03/04/2022    GLUCOSE 142 04/29/2011    PROT 6.5 03/01/2022    LABALBU 3.8 03/01/2022    CALCIUM 8.2 03/04/2022    BILITOT 0.3 03/01/2022    ALKPHOS 113 03/01/2022    AST 39 03/01/2022    ALT 16 03/01/2022              Assessment:    · Altered mental status -probable metabolic encephalopathy  · Noninsulin dependent diabetes mellitus type 2hyperglycemic-hemoglobin A1c 12.3 with resolving hyperosmolar state  · New onset tonic-clonic seizure  · 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

## 2022-03-06 LAB
HERPES TYPE 1/2 IGM COMBINED: 0.63 IV
HERPES TYPE I/II IGG COMBINED: 8.96 IV
HSV 1 GLYCOPROTEIN G AB IGG: 4.34 IV
HSV 2 GLYCOPROTEIN G AB IGG: 0.11 IV
METER GLUCOSE: 108 MG/DL (ref 74–99)
METER GLUCOSE: 121 MG/DL (ref 74–99)
METER GLUCOSE: 182 MG/DL (ref 74–99)
METER GLUCOSE: 187 MG/DL (ref 74–99)
METER GLUCOSE: 216 MG/DL (ref 74–99)

## 2022-03-06 PROCEDURE — 6360000002 HC RX W HCPCS: Performed by: INTERNAL MEDICINE

## 2022-03-06 PROCEDURE — 6370000000 HC RX 637 (ALT 250 FOR IP): Performed by: INTERNAL MEDICINE

## 2022-03-06 PROCEDURE — 82962 GLUCOSE BLOOD TEST: CPT

## 2022-03-06 PROCEDURE — 1200000000 HC SEMI PRIVATE

## 2022-03-06 PROCEDURE — 2580000003 HC RX 258: Performed by: INTERNAL MEDICINE

## 2022-03-06 RX ADMIN — LEVETIRACETAM 500 MG: 500 TABLET, FILM COATED ORAL at 08:40

## 2022-03-06 RX ADMIN — ENOXAPARIN SODIUM 40 MG: 100 INJECTION SUBCUTANEOUS at 08:40

## 2022-03-06 RX ADMIN — INSULIN LISPRO 4 UNITS: 100 INJECTION, SOLUTION INTRAVENOUS; SUBCUTANEOUS at 12:18

## 2022-03-06 RX ADMIN — ACYCLOVIR SODIUM 650 MG: 50 INJECTION, SOLUTION INTRAVENOUS at 08:44

## 2022-03-06 RX ADMIN — METFORMIN HYDROCHLORIDE 1000 MG: 500 TABLET ORAL at 18:01

## 2022-03-06 RX ADMIN — METFORMIN HYDROCHLORIDE 1000 MG: 500 TABLET ORAL at 08:40

## 2022-03-06 RX ADMIN — ATORVASTATIN CALCIUM 40 MG: 40 TABLET, FILM COATED ORAL at 21:05

## 2022-03-06 RX ADMIN — THIAMINE HYDROCHLORIDE 100 MG: 100 INJECTION, SOLUTION INTRAMUSCULAR; INTRAVENOUS at 05:52

## 2022-03-06 RX ADMIN — ALOGLIPTIN 12.5 MG: 12.5 TABLET, FILM COATED ORAL at 08:42

## 2022-03-06 RX ADMIN — SODIUM CHLORIDE, PRESERVATIVE FREE 10 ML: 5 INJECTION INTRAVENOUS at 08:46

## 2022-03-06 RX ADMIN — ASPIRIN 81 MG: 81 TABLET, COATED ORAL at 08:40

## 2022-03-06 RX ADMIN — ACYCLOVIR SODIUM 650 MG: 50 INJECTION, SOLUTION INTRAVENOUS at 00:30

## 2022-03-06 RX ADMIN — THIAMINE HYDROCHLORIDE 100 MG: 100 INJECTION, SOLUTION INTRAMUSCULAR; INTRAVENOUS at 12:17

## 2022-03-06 RX ADMIN — INSULIN LISPRO 2 UNITS: 100 INJECTION, SOLUTION INTRAVENOUS; SUBCUTANEOUS at 21:05

## 2022-03-06 RX ADMIN — LISINOPRIL 10 MG: 10 TABLET ORAL at 08:40

## 2022-03-06 RX ADMIN — INSULIN GLARGINE 6 UNITS: 100 INJECTION, SOLUTION SUBCUTANEOUS at 08:47

## 2022-03-06 RX ADMIN — Medication 10 ML: at 09:52

## 2022-03-06 RX ADMIN — SODIUM CHLORIDE AND POTASSIUM CHLORIDE: 9; 1.49 INJECTION, SOLUTION INTRAVENOUS at 17:59

## 2022-03-06 RX ADMIN — ACYCLOVIR SODIUM 650 MG: 50 INJECTION, SOLUTION INTRAVENOUS at 18:00

## 2022-03-06 RX ADMIN — LEVETIRACETAM 500 MG: 500 TABLET, FILM COATED ORAL at 21:05

## 2022-03-06 RX ADMIN — THIAMINE HYDROCHLORIDE 100 MG: 100 INJECTION, SOLUTION INTRAMUSCULAR; INTRAVENOUS at 21:33

## 2022-03-06 ASSESSMENT — PAIN SCALES - GENERAL: PAINLEVEL_OUTOF10: 0

## 2022-03-06 NOTE — PROGRESS NOTES
[address] [place] [address] [facility]  Progress Note    Chief complain: Change in the mental status    SUBJECTIVE:    Patient is awake, alert , tolerating antibiotics well     ROS:  Constitutional:  denies fever , chills   Cardiovascular: denies chest pain or palpitation   Gastrointestinal: . Denies abdominal pain, diarrhea, no nausea or vomiting  Genitourinary:      Denies  dysuria or burning micturition  Musculoskeletal: no aches or pain   Allergic/Immunologic:  No rash or itching     OBJECTIVE:    Vitals:    03/05/22 1445 03/05/22 1845 03/05/22 2100 03/06/22 0830   BP: 134/68 107/68 (!) 158/71 130/60   Pulse: 89 87 80 72   Resp:  20 18 18   Temp:  98 °F (36.7 °C) 98.5 °F (36.9 °C) 97.7 °F (36.5 °C)   TempSrc:  Oral Oral Oral   SpO2:  99% 95% 97%   Weight:       Height:           HEENT :         unremarkable   Heart:             RRR,  No murmurs, no gallops  Lungs:            clear to auscultation, no wheezing , no rales  Abdomen:       soft, non tender, bowel sounds present  Extremites:     No edema, no ulcers,  Skin:                Normal turgor,normal texture  CNS examination normal cranial nerves intact normal he is more awake alert his confusion has resolved                    Current Facility-Administered Medications   Medication Dose Route Frequency Provider Last Rate Last Admin    lisinopril (PRINIVIL;ZESTRIL) tablet 10 mg  10 mg Oral Daily Ilan Reese DO   10 mg at 03/06/22 0840    levETIRAcetam (KEPPRA) tablet 500 mg  500 mg Oral BID Caden Magallanes MD   500 mg at 03/06/22 0840    insulin lispro (HUMALOG) injection vial 0-12 Units  0-12 Units SubCUTAneous 4x Daily AC & HS Caden Magallanes MD   4 Units at 03/06/22 1218    atorvastatin (LIPITOR) tablet 40 mg  40 mg Oral Nightly Caden Magallanes MD   40 mg at 03/05/22 2034    acyclovir (ZOVIRAX) 650 mg in dextrose 5 % 100 mL IVPB  10 mg/kg (Ideal) IntraVENous Q8H Bevely Buttery, DO   Stopped at 03/06/22 0952    insulin glargine (LANTUS) injection vial 6 Units  6 Units SubCUTAneous Daily Max Lind MD   6 Units at 03/06/22 0847    thiamine (B-1) 100 mg in sodium chloride 0.9 % 100 mL IVPB  100 mg IntraVENous Q8H Max Lind MD   Stopped at 03/06/22 1256    sodium chloride flush 0.9 % injection 5-40 mL  5-40 mL IntraVENous 2 times per day Max Lind MD   10 mL at 03/06/22 0952    sodium chloride flush 0.9 % injection 5-40 mL  5-40 mL IntraVENous PRN Max Lind MD        0.9 % sodium chloride infusion  25 mL IntraVENous PRN Max Lind MD        LORazepam (ATIVAN) injection 0.5 mg  0.5 mg IntraVENous Q6H PRN Max Lind MD   0.5 mg at 03/03/22 2126    sodium chloride flush 0.9 % injection 5-40 mL  5-40 mL IntraVENous 2 times per day Max Lind MD   10 mL at 03/06/22 0846    sodium chloride flush 0.9 % injection 5-40 mL  5-40 mL IntraVENous PRN Mxa Lind MD   10 mL at 03/02/22 0308    0.9 % sodium chloride infusion  25 mL IntraVENous PRN Max Lind MD   Stopped at 03/04/22 0600    aspirin EC tablet 81 mg  81 mg Oral Daily Edel Expose, DO   81 mg at 03/06/22 0840    alogliptin (NESINA) tablet 12.5 mg  12.5 mg Oral Daily Edel Expose, DO   12.5 mg at 03/06/22 6539    metFORMIN (GLUCOPHAGE) tablet 1,000 mg  1,000 mg Oral BID WC Edel Expose, DO   1,000 mg at 03/06/22 0840    acetaminophen (TYLENOL) suppository 650 mg  650 mg Rectal Q4H PRN Edel Expose, DO        acetaminophen (TYLENOL) tablet 500 mg  500 mg Oral Q6H PRN Edel Expose, DO        enoxaparin (LOVENOX) injection 40 mg  40 mg SubCUTAneous Daily Edel Expose, DO   40 mg at 03/06/22 0840    polyethylene glycol (GLYCOLAX) packet 17 g  17 g Oral Daily PRN Edel Expose, DO        0.9% NaCl with KCl 20 mEq infusion   IntraVENous Continuous Pola Reese, DO 50 mL/hr at 03/05/22 1710 New Bag at 03/05/22 1710    dextrose 5 % solution  100 mL/hr IntraVENous PRN Nina Pope DO        dextrose 5 % and 0.45 % sodium chloride infusion   IntraVENous Continuous PRN Rubi Lyn DO        glucose (GLUTOSE) 40 % oral gel 15 g  15 g Oral PRN Hair Chancy, APRN - CNP        glucagon (rDNA) injection 1 mg  1 mg IntraMUSCular PRN Bethel Chancy, APRN - CNP        dextrose 5 % solution  100 mL/hr IntraVENous PRN Bethel Chancy, APRN - CNP        dextrose bolus (hypoglycemia) 10% 125 mL  125 mL IntraVENous PRN Bethel Chancy, APRN - CNP        Or    dextrose bolus (hypoglycemia) 10% 250 mL  250 mL IntraVENous PRN Bethel Chancy, APRN - CNP            LABS    CBC:     WBC   Date Value Ref Range Status   03/05/2022 7.8 4.5 - 11.5 E9/L Final   03/04/2022 7.9 4.5 - 11.5 E9/L Final   03/03/2022 12.0 (H) 4.5 - 11.5 E9/L Final     Hematocrit   Date Value Ref Range Status   03/05/2022 38.3 37.0 - 54.0 % Final   03/04/2022 35.1 (L) 37.0 - 54.0 % Final   03/03/2022 38.7 37.0 - 54.0 % Final     Platelets   Date Value Ref Range Status   03/05/2022 284 130 - 450 E9/L Final   03/04/2022 247 130 - 450 E9/L Final   03/03/2022 286 130 - 450 E9/L Final         BMP:      Sodium   Date Value Ref Range Status   03/05/2022 135 132 - 146 mmol/L Final   03/04/2022 136 132 - 146 mmol/L Final   03/03/2022 137 132 - 146 mmol/L Final     Potassium   Date Value Ref Range Status   03/05/2022 4.0 3.5 - 5.0 mmol/L Final   03/04/2022 4.3 3.5 - 5.0 mmol/L Final   03/03/2022 4.4 3.5 - 5.0 mmol/L Final     Chloride   Date Value Ref Range Status   03/05/2022 100 98 - 107 mmol/L Final   03/04/2022 107 98 - 107 mmol/L Final   03/03/2022 107 98 - 107 mmol/L Final     CO2   Date Value Ref Range Status   03/05/2022 22 22 - 29 mmol/L Final   03/04/2022 21 (L) 22 - 29 mmol/L Final   03/03/2022 20 (L) 22 - 29 mmol/L Final     BUN   Date Value Ref Range Status   03/05/2022 18 6 - 23 mg/dL Final   03/04/2022 18 6 - 23 mg/dL Final   03/03/2022 19 6 - 23 mg/dL Final     CREATININE   Date Value Ref Range Status   03/05/2022 0.8 0.7 - 1.2 mg/dL Final   03/04/2022 0.6 (L) 0.7 - 1.2 mg/dL Final   03/03/2022 0.6 (L) 0.7 - 1.2 mg/dL Final     Glucose   Date Value Ref Range Status   03/05/2022 236 (H) 74 - 99 mg/dL Final   03/04/2022 195 (H) 74 - 99 mg/dL Final   03/03/2022 203 (H) 74 - 99 mg/dL Final         Hepatic Function Panel:    Lab Results   Component Value Date    ALKPHOS 113 03/01/2022    ALT 16 03/01/2022    AST 39 03/01/2022    PROT 6.5 03/01/2022    BILITOT 0.3 03/01/2022    BILIDIR <0.2 02/28/2022    IBILI see below 02/28/2022    LABALBU 3.8 03/01/2022        Lab Results   Component Value Date/Time    SEDRATE 21 (H) 03/01/2022 04:47 AM       Lab Results   Component Value Date/Time    CRP 1.4 (H) 03/01/2022 04:47 AM       Microbiology :  No results found for: BC  No results found for: BLOODCULT2  Urine Culture, Routine   Date Value Ref Range Status   03/02/2022 Growth not present  Final     No results found for: CULTRESP  No results found for: WNDABS          ASSESSMENT:   Admitted for Infestation by bed bug [B88.8]  [facility] [R56.9]  Altered mental status [R41.82]  Altered mental status, unspecified altered mental status type [R41.82]  Hyperosmolar hyperglycemic state (HHS) ([facility] [E11.00, E11.65]  On treatment for   Leukocytosis tredning down   eval for CNS infection - CSF NGTD, WBC 3  Has Small chronic right frontal lobe infarction.  Multiple chronic lacunar   Infarctions  Alcohol withdrawal             PLAN:  acyclovir (ZOVIRAX) 650 mg in dextrose 5 % 100 mL IVPB, Q8H  So for CSF for HSV PCR is pending  Clinically patient doing better we will switch him to oral acyclovir in a.m.   Labs, cultures, imaging reviewed       Noreen Patino MD, FACP  3/6/2022  1:51 PM

## 2022-03-06 NOTE — PROGRESS NOTES
bruising. Hematologic/Lymphatic:  Denies bruising or bleeding. Physical Exam:  I/O this shift:  In: 240 [P.O.:240]  Out: 650 [Urine:650]    Intake/Output Summary (Last 24 hours) at 3/6/2022 1626  Last data filed at 3/6/2022 1359  Gross per 24 hour   Intake 1855.49 ml   Output 4250 ml   Net -2394.51 ml   I/O last 3 completed shifts: In: 4661.5 [P.O.:760; I.V.:3331.3; IV Piggyback:570.2]  Out: 75645 [CINAD:85641]  Patient Vitals for the past 96 hrs (Last 3 readings):   Weight   03/05/22 0551 148 lb (67.1 kg)   03/04/22 0600 145 lb 14.4 oz (66.2 kg)     Vital Signs:   Blood pressure 130/60, pulse 72, temperature 97.7 °F (36.5 °C), temperature source Oral, resp. rate 18, height 5' 6\" (1.676 m), weight 148 lb (67.1 kg), SpO2 97 %. General appearance:  Patient alert to person only  Head:  Normocephalic. No masses, lesions or tenderness. Eyes:  PERRLA. EOMI. Sclera clear. Buccal mucosa moist.  ENT:  Ears normal. Mucosa normal.  Neck:    Supple. Trachea midline. No thyromegaly. No JVD. No bruits. Heart:    Rhythm regular. Rate controlled. No murmurs. Lungs:    Symmetrical. Clear to auscultation bilaterally. No wheezes. No rhonchi. No rales. Abdomen:   Soft. Non-tender. Non-distended. Bowel sounds positive. No organomegaly or masses. No pain on palpation. Extremities:    Peripheral pulses present. No peripheral edema. No ulcers. No cyanosis. No clubbing. Neurologic:    Alert x3. No focal deficit. Cranial nerves grossly intact. No focal weakness. Psych:   Behavior is normal. Mood appears normal. Speech is not rapid and/or pressured. Musculoskeletal:   Spine ROM normal. Muscular strength intact. Gait not assessed. Integumentary:  No rashes  Skin normal color and texture.   Genitalia/Breast:  Deferred    Medication:  Scheduled Meds:   lisinopril  10 mg Oral Daily    levETIRAcetam  500 mg Oral BID    insulin lispro  0-12 Units SubCUTAneous 4x Daily AC & HS    atorvastatin  40 mg Oral Nightly    acyclovir  10 mg/kg (Ideal) IntraVENous Q8H    insulin glargine  6 Units SubCUTAneous Daily    thiamine (VITAMIN B1) IVPB  100 mg IntraVENous Q8H    sodium chloride flush  5-40 mL IntraVENous 2 times per day    sodium chloride flush  5-40 mL IntraVENous 2 times per day    aspirin  81 mg Oral Daily    alogliptin  12.5 mg Oral Daily    metFORMIN  1,000 mg Oral BID WC    enoxaparin  40 mg SubCUTAneous Daily     Continuous Infusions:   sodium chloride      sodium chloride Stopped (03/04/22 0600)    0.9% NaCl with KCl 20 mEq 50 mL/hr at 03/05/22 1710    dextrose      dextrose 5 % and 0.45 % NaCl      dextrose         Objective Data:  CBC with Differential:    Lab Results   Component Value Date    WBC 7.8 03/05/2022    RBC 4.22 03/05/2022    HGB 12.6 03/05/2022    HCT 38.3 03/05/2022     03/05/2022    MCV 90.8 03/05/2022    MCH 29.9 03/05/2022    MCHC 32.9 03/05/2022    RDW 13.9 03/05/2022    SEGSPCT 66 04/28/2011    LYMPHOPCT 2.0 03/02/2022    MONOPCT 3.0 03/02/2022    BASOPCT 0.0 03/02/2022    MONOSABS 0.40 03/02/2022    LYMPHSABS 0.27 03/02/2022    EOSABS 0.00 03/02/2022    BASOSABS 0.00 03/02/2022     CMP:    Lab Results   Component Value Date     03/05/2022    K 4.0 03/05/2022    K 3.9 07/22/2021     03/05/2022    CO2 22 03/05/2022    BUN 18 03/05/2022    CREATININE 0.8 03/05/2022    GFRAA >60 03/05/2022    LABGLOM >60 03/05/2022    GLUCOSE 236 03/05/2022    GLUCOSE 142 04/29/2011    PROT 6.5 03/01/2022    LABALBU 3.8 03/01/2022    CALCIUM 8.2 03/05/2022    BILITOT 0.3 03/01/2022    ALKPHOS 113 03/01/2022    AST 39 03/01/2022    ALT 16 03/01/2022              Assessment:    · Altered mental status -probable metabolic encephalopathy  · Noninsulin dependent diabetes mellitus type 2hyperglycemic-hemoglobin A1c 12.3 with resolving hyperosmolar state  · New onset tonic-clonic seizure  · 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 Resolving encephalopathy  · Blood pressures seem under better control  · Repeat lab work seem to be improving  · Social service for discharge planning  · Dr. Laine Burkitt to resume care tomorrow      More than 50% of my  time was spent at the bedside counseling/coordinating care with the patient and/or family with face to face contact. This time was spent reviewing notes and laboratory data as well as instructing and counseling the patient. Time I spent with the family or surrogate(s) is included only if the patient was incapable of providing the necessary information or participating in medical decisions. I also discussed the differential diagnosis and all of the proposed management plans with the patient and individuals accompanying the patient. Reina Tamayo requires this high level of physician care and nursing on the Telemetry unit due the complexity of decision management and chance of rapid decline or death. Continued cardiac monitoring and higher level of nursing are required. I am readily available for any further decision-making and intervention.      Vickey Serrano DO, F.A.C.O.I.  3/6/2022  4:26 PM

## 2022-03-07 LAB
ANION GAP SERPL CALCULATED.3IONS-SCNC: 8 MMOL/L (ref 7–16)
BUN BLDV-MCNC: 13 MG/DL (ref 6–23)
CALCIUM SERPL-MCNC: 8.4 MG/DL (ref 8.6–10.2)
CHLORIDE BLD-SCNC: 103 MMOL/L (ref 98–107)
CO2: 24 MMOL/L (ref 22–29)
CREAT SERPL-MCNC: 0.7 MG/DL (ref 0.7–1.2)
GFR AFRICAN AMERICAN: >60
GFR NON-AFRICAN AMERICAN: >60 ML/MIN/1.73
GLUCOSE BLD-MCNC: 143 MG/DL (ref 74–99)
HCT VFR BLD CALC: 38.8 % (ref 37–54)
HEMOGLOBIN: 12.7 G/DL (ref 12.5–16.5)
HERPES SIMPLEX VIRUS BY PCR: NOT DETECTED
HSV SOURCE: NORMAL
MAGNESIUM: 1.9 MG/DL (ref 1.6–2.6)
MCH RBC QN AUTO: 29.7 PG (ref 26–35)
MCHC RBC AUTO-ENTMCNC: 32.7 % (ref 32–34.5)
MCV RBC AUTO: 90.7 FL (ref 80–99.9)
METER GLUCOSE: 134 MG/DL (ref 74–99)
METER GLUCOSE: 220 MG/DL (ref 74–99)
METER GLUCOSE: 223 MG/DL (ref 74–99)
PDW BLD-RTO: 13.6 FL (ref 11.5–15)
PHOSPHORUS: 3.8 MG/DL (ref 2.5–4.5)
PLATELET # BLD: 252 E9/L (ref 130–450)
PMV BLD AUTO: 8.7 FL (ref 7–12)
POTASSIUM SERPL-SCNC: 3.9 MMOL/L (ref 3.5–5)
RBC # BLD: 4.28 E12/L (ref 3.8–5.8)
SARS-COV-2, NAAT: NOT DETECTED
SODIUM BLD-SCNC: 135 MMOL/L (ref 132–146)
WBC # BLD: 7.5 E9/L (ref 4.5–11.5)

## 2022-03-07 PROCEDURE — 6360000002 HC RX W HCPCS: Performed by: INTERNAL MEDICINE

## 2022-03-07 PROCEDURE — 6370000000 HC RX 637 (ALT 250 FOR IP): Performed by: INTERNAL MEDICINE

## 2022-03-07 PROCEDURE — 2580000003 HC RX 258: Performed by: INTERNAL MEDICINE

## 2022-03-07 PROCEDURE — 80048 BASIC METABOLIC PNL TOTAL CA: CPT

## 2022-03-07 PROCEDURE — 82962 GLUCOSE BLOOD TEST: CPT

## 2022-03-07 PROCEDURE — 97110 THERAPEUTIC EXERCISES: CPT

## 2022-03-07 PROCEDURE — 51798 US URINE CAPACITY MEASURE: CPT

## 2022-03-07 PROCEDURE — 84100 ASSAY OF PHOSPHORUS: CPT

## 2022-03-07 PROCEDURE — 85027 COMPLETE CBC AUTOMATED: CPT

## 2022-03-07 PROCEDURE — 97530 THERAPEUTIC ACTIVITIES: CPT

## 2022-03-07 PROCEDURE — 83735 ASSAY OF MAGNESIUM: CPT

## 2022-03-07 PROCEDURE — 36415 COLL VENOUS BLD VENIPUNCTURE: CPT

## 2022-03-07 PROCEDURE — 87635 SARS-COV-2 COVID-19 AMP PRB: CPT

## 2022-03-07 PROCEDURE — 1200000000 HC SEMI PRIVATE

## 2022-03-07 RX ORDER — LEVETIRACETAM 500 MG/1
500 TABLET ORAL 2 TIMES DAILY
Qty: 60 TABLET | Refills: 3 | DISCHARGE
Start: 2022-03-07

## 2022-03-07 RX ORDER — INSULIN GLARGINE 100 [IU]/ML
6 INJECTION, SOLUTION SUBCUTANEOUS DAILY
Qty: 10 ML | Refills: 3 | DISCHARGE
Start: 2022-03-08

## 2022-03-07 RX ORDER — CALCIUM CARBONATE 200(500)MG
500 TABLET,CHEWABLE ORAL 3 TIMES DAILY PRN
Status: DISCONTINUED | OUTPATIENT
Start: 2022-03-07 | End: 2022-03-10 | Stop reason: HOSPADM

## 2022-03-07 RX ORDER — ATORVASTATIN CALCIUM 40 MG/1
40 TABLET, FILM COATED ORAL NIGHTLY
Qty: 30 TABLET | Refills: 3 | DISCHARGE
Start: 2022-03-07

## 2022-03-07 RX ADMIN — THIAMINE HYDROCHLORIDE 100 MG: 100 INJECTION, SOLUTION INTRAMUSCULAR; INTRAVENOUS at 08:00

## 2022-03-07 RX ADMIN — INSULIN GLARGINE 6 UNITS: 100 INJECTION, SOLUTION SUBCUTANEOUS at 09:20

## 2022-03-07 RX ADMIN — ENOXAPARIN SODIUM 40 MG: 100 INJECTION SUBCUTANEOUS at 09:09

## 2022-03-07 RX ADMIN — INSULIN LISPRO 4 UNITS: 100 INJECTION, SOLUTION INTRAVENOUS; SUBCUTANEOUS at 21:43

## 2022-03-07 RX ADMIN — METFORMIN HYDROCHLORIDE 1000 MG: 500 TABLET ORAL at 09:09

## 2022-03-07 RX ADMIN — LEVETIRACETAM 500 MG: 500 TABLET, FILM COATED ORAL at 09:09

## 2022-03-07 RX ADMIN — ACYCLOVIR SODIUM 650 MG: 50 INJECTION, SOLUTION INTRAVENOUS at 14:26

## 2022-03-07 RX ADMIN — ALOGLIPTIN 12.5 MG: 12.5 TABLET, FILM COATED ORAL at 09:09

## 2022-03-07 RX ADMIN — ACYCLOVIR SODIUM 650 MG: 50 INJECTION, SOLUTION INTRAVENOUS at 06:30

## 2022-03-07 RX ADMIN — SODIUM CHLORIDE, PRESERVATIVE FREE 10 ML: 5 INJECTION INTRAVENOUS at 21:42

## 2022-03-07 RX ADMIN — INSULIN LISPRO 2 UNITS: 100 INJECTION, SOLUTION INTRAVENOUS; SUBCUTANEOUS at 09:09

## 2022-03-07 RX ADMIN — Medication 10 ML: at 21:43

## 2022-03-07 RX ADMIN — INSULIN LISPRO 4 UNITS: 100 INJECTION, SOLUTION INTRAVENOUS; SUBCUTANEOUS at 12:34

## 2022-03-07 RX ADMIN — LEVETIRACETAM 500 MG: 500 TABLET, FILM COATED ORAL at 21:42

## 2022-03-07 RX ADMIN — LISINOPRIL 10 MG: 10 TABLET ORAL at 09:09

## 2022-03-07 RX ADMIN — CALCIUM CARBONATE (ANTACID) CHEW TAB 500 MG 500 MG: 500 CHEW TAB at 20:03

## 2022-03-07 RX ADMIN — ATORVASTATIN CALCIUM 40 MG: 40 TABLET, FILM COATED ORAL at 21:42

## 2022-03-07 RX ADMIN — METFORMIN HYDROCHLORIDE 1000 MG: 500 TABLET ORAL at 17:07

## 2022-03-07 RX ADMIN — ASPIRIN 81 MG: 81 TABLET, COATED ORAL at 09:11

## 2022-03-07 ASSESSMENT — PAIN SCALES - GENERAL
PAINLEVEL_OUTOF10: 0

## 2022-03-07 ASSESSMENT — PAIN SCALES - WONG BAKER: WONGBAKER_NUMERICALRESPONSE: 0

## 2022-03-07 NOTE — PROGRESS NOTES
Physical Therapy  Physical Therapy Treatment Note/Plan of Care    Room #:  9807/5047-28  Patient Name: Tosha Tse  YOB: 1945  MRN: [id number]    Date of Service: 3/7/2022     Tentative placement recommendation: Subacute rehab  Equipment recommendation: To be determined      Evaluating Physical Therapist: Gurpreet Bae Physical Therapist      Specific Provider Orders/Date/Referring Provider : 02/28/22 1645   PT eval and treat Start: 02/28/22 1645, End: 02/28/22 1645, ONE TIME, Standing Count: 1 Occurrences, R    Jacques Montano DO      Admitting Diagnosis:   Infestation by bed bug [B88.8]  Seizure ([facility] [R56.9]  Altered mental status [R41.82]  Altered mental status, unspecified altered mental status type [R41.82]  Hyperosmolar hyperglycemic state (HHS) ([address] [E11.00, E11.65]      Surgery: Lumbar puncture 3/1  Visit Diagnoses       Codes    Seizure ([address]     R56.9    Infestation by bed bug     B88.8          Patient Active Problem List   Diagnosis    Delirium    Hyperosmolar hyperglycemic state (HHS) ([address]    New onset seizure ([address]    Altered mental status        ASSESSMENT of Current Deficits Patient exhibits decreased strength, balance and endurance impairing functional mobility, transfers, gait  and gait distance. Patient was pleasant during therapy and hard of hearing. Patient requires minimal assist to perform all functional mobility. Patient needing consistent cueing for upright posture, walker approximation, increased step length, base of support due to kyphotic posture, narrow base of support, and small, shuffling steps during ambulation. The patient will benefit from continued skilled therapy to increase strength and improve balance for safe functional mobility, to decrease risk of falls, and to meet goals at discharge.       PHYSICAL THERAPY  PLAN OF CARE       Physical therapy plan of care is established based on physician order,  patient diagnosis and clinical assessment    Current Treatment Recommendations:    -Bed Mobility: Lower extremity exercises  and Upper extremity exercises   -Sitting Balance: Incorporate reaching activities to activate trunk muscles  and Hands on support to maintain midline   -Standing Balance: Perform strengthening exercises in standing to promote motor control with or without upper extremity support  and Perform sit to stand activities maintaining FWB (full weight bearing) weightbearing Bilateral   -Transfers: Provide instruction on proper hand and foot position for adequate transfer of weight onto lower extremities and use of gait device if needed and Cues for hand placement, technique and safety. Provide stabilization to prevent fall   -Gait: Gait training, Standing activities to improve: base of support, weight shift, weight bearing  and Use of Assistive device for FWB (full weight bearing) weight bearing Bilateral     -Endurance: Utilize Supervised activities to increase level of endurance to allow for safe functional mobility including transfers and gait  and Use graduated activities to promote good breathing techniques and provide support and education to maximize respiratory function    PT long term treatment goals are located in below grid    Patient and or family understand(s) diagnosis, prognosis, and plan of care. Frequency of treatments: Patient will be seen  daily. Prior Level of Function: Patient poor historian unable to provide information about ambulation. Rehab Potential: good  for baseline    Past medical history:   Past Medical History:   Diagnosis Date    Type 2 diabetes mellitus with hyperglycemia, without long-term current use of insulin ([facility]      History reviewed. No pertinent surgical history. SUBJECTIVE:    Precautions: Continuous Pulse Oximetry , falls and seizures , brothers,  Social history: Patient lived at home alone in [facility] according to chart. Patient poor historian.      Equipment owned: Patient poor historian    AM-PAC Basic Mobility       AM-PAC Mobility Inpatient   How much difficulty turning over in bed?: A Little  How much difficulty sitting down on / standing up from a chair with arms?: A Little  How much difficulty moving from lying on back to sitting on side of bed?: A Little  How much help from another person moving to and from a bed to a chair?: A Little  How much help from another person needed to walk in hospital room?: A Little  How much help from another person for climbing 3-5 steps with a railing?: A Lot  AM-PAC Inpatient Mobility Raw Score : 17  AM-PAC Inpatient T-Scale Score : 42.13  Mobility Inpatient CMS 0-100% Score: 50.57  Mobility Inpatient CMS G-Code Modifier : CK    Nursing cleared patient for PT treatment. .   OBJECTIVE;   Initial Evaluation  Date: 3/2/2022 Treatment Date:  3/7/2022     Short Term/ Long Term   Goals   Was pt agreeable to Eval/treatment? Yes Yes To be met in 5 days   Pain level   0/10   0/10    Bed Mobility    Rolling: Maximal assist of 1    Supine to sit: Maximal assist of 1    Sit to supine: Maximal assist of 1    Scooting: Maximal assist of 1   Rolling: Minimal assist of 1   Supine to sit: Minimal assist of 1   Sit to supine: Not assessed    Scooting: Moderate assist of 1    Rolling: Minimal assist of 1    Supine to sit: Minimal assist of 1    Sit to supine: Minimal assist of 1    Scooting: Minimal assist of 1     Transfers Sit to stand: Moderate assist of  2  Sit to stand: Minimal assist of 1      Sit to stand: Moderate assist of 1    Ambulation    not assessed  2 x 60 feet using wheeled walker with minimal assist. V/C for upright posture, walker approximation, increased step length/AYLEEN, pacing, and safety.   25 feet using  wheeled walker with Moderate assist of 1    Stair negotiation: ascended and descended   Not assessed     Not assessed    ROM Within functional limits    Increase range of motion 10% of affected joints    Strength BUE:  refer to OT eval  RLE:  3+/5  LLE:  3+/5  Increase strength in affected mm groups by 1/3 grade   Balance Sitting EOB:  poor + posterior lean  Dynamic Standing:  poor  Sitting EOB: good   Dynamic Standing: fair wheeled walker   Sitting EOB:  fair   Dynamic Standing: fair      Patient is Alert & Oriented x person, place, time and situation and follows directions    Sensation:  Patient  denies numbness/tingling   Edema:  no   Endurance: poor +    Vitals: room air   Blood Pressure at rest  Blood Pressure during session    Heart Rate at rest  Heart Rate during session    SPO2 at rest %  SPO2 during session %     Patient education  Patient educated on role of Physical Therapy, risks of immobility, safety and plan of care, energy conservation,  importance of mobility while in hospital , purse lip breathing, ankle pumps, quad set and glut set for edema control, blood clot prevention and safety      Patient response to education:   Pt verbalized understanding Pt demonstrated skill Pt requires further education in this area   Yes Partial Yes      Treatment:  Patient practiced and was instructed/facilitated in the following treatment: Patient performed supine exercises. Pt assisted to edge of bed,  Sat edge of bed 10 minutes with Supervision  to increase dynamic sitting balance and activity tolerance. Patient performed Sit to Stand transfer with cues for hand placement, ambulated in the hallway, and back to the chair in his room. Therapeutic Exercises:  ankle pumps, quad sets, heel slide, hip abduction/adduction and straight leg raise,  x 20 reps. AROM     At end of session, patient in chair with . call light and phone within reach,  all lines and tubes intact, nursing notified. Patient would benefit from continued skilled Physical Therapy to improve functional independence and quality of life.          Patient's/ family goals   none stated    Time in  14:10  Time out  14:36    Total Treatment Time  26 minutes    CPT codes:  Therapeutic activities (76308)   15 minutes  1 unit(s)  Therapeutic exercises (77642)   11 minutes  1 unit(s)     Josue De La Cruz  hospitals  LIC # [id number]

## 2022-03-07 NOTE — PROGRESS NOTES
Department of Internal Medicine        CHIEF COMPLAINT: Altered mental status, seizures    Reason for Admission: Altered mental status, seizure, hypoglycemia    HISTORY OF PRESENT ILLNESS:      The patient is a 78 y.o. male who presents with being brought in by paramedics. Apparently patient's neighbors called EMS when he heard the patient screaming and when they found him he was unresponsive. Paramedics came to check blood sugar which was reading high. There is no evidence of any trauma at the scene. Patient was incontinent of urine. The patient had tonic-clonic seizure in the ED and was treated with Ativan and Keppra. CT the head did not show any acute intracranial abnormality. Patient had a random blood sugar of 573 on admission. Liver enzymes essentially normal with a negative drug screen and a WBC 7.1 hemoglobin 13.6.    3/1/2022  Patient seen examined on ICU. The patient is much more alert and oriented currently than he was yesterday. Patient is oriented to person. Patient still weak with very poor memory. He denies any chest or abdominal pain. He denies any headaches or blurred vision. BUN/creatinine 12/0.6. Blood sugars ranging 200s. Hemoglobin A1c was 12.3. WBC 13.8 with hemoglobin 13.3. Sed rate is 21. Temperature 98.1 with heart rate 83 and blood pressure 150/69. O2 sat 98% on room air at rest.  Urine output is very good. Case discussed with Dr. Ese Soto today. With this patient's altered mental status even though it has improved we are attempting to have IR to do a lumbar puncture. I think with the patient's presentation it would be a very good idea to have this done. 3/2/2022  Patient seen examined on ICU. Patient is a little bit more alert and oriented again today. Patient still very very weak. Patient still has somewhat of a flat affect. Patient does deny any type of chest, abdominal pain. Denies any significant unilateral weakness.   MRI of the brain showed just old infarcts. The lumbar puncture though did show an increase in protein. BUN/creatinine 15/0.5. Blood sugars ranging 141190. WBC 13.5 hemoglobin 13. Temperature is 97.8 with heart rate is 68 blood pressure 160/55. O2 sat 97% on 2 L nasal cannula. Urine output is good. 3/3/2022  Patient seen examined in ICU. Patient is much more alert today. Patient states he feels great and he denies any problem chest pain, abdominal pain, nausea vomiting. Patient denies problem with headaches or blurred vision. Patient also denies any history of daily alcohol abuse or any significant binge drinking. BUN/creatinine 19/0.6, blood sugars ranging [id number]. WBC is 12 with hemoglobin 12.8. Temperature 97.7 with heart rate 89 blood pressure 142/60. O2 sat 97% room air at rest.  Urine output ranges 550-1400 cc a shift. EEG reviewed with no evidence of active seizure. 3/4/2022  Patient seen examined in ICU. Patient again is little bit more alert and oriented today. Patient is oriented to person place. Patient has one-on-one sitter and she states the patient is not confused this morning and did eat most of his breakfast.  BUN/creatinine 18/0.6 with patient's blood sugars ranging 195490. Hemoglobin 11.7 WBC 7.9. Temperature 97.6 heart rate 83 and blood pressure 160/65. O2 sat 96% room air at rest.  Urine output was good ranging 550-1900 cc a shift. Neurology, intensivist, ID notes reviewed. 3/5-3/6 -coverage by Dr Cholo Chau    3/7/2022  Patient seen examined on telemetry floor. Patient seems to be doing little bit better. He denies any problem with any chest pain, abdominal pain, nausea vomiting or unusual shortness of breath. Still fairly weak but this is improving. BUN/creatinine was 13/0.7 with blood sugars ranging 143216. WBC 7.5 hemoglobin 12.7. Temperature is 98.4 with heart rate of 80 blood pressure 116/40. O2 sat 98% room air at rest.  Urine output is excellent.     Past Medical History:    Past Medical History:   Diagnosis Date    Type 2 diabetes mellitus with hyperglycemia, without long-term current use of insulin ([facility] [address]      Past Surgical History:    History reviewed. No pertinent surgical history. Medications Prior to Admission:    @  Prior to Admission medications    Medication Sig Start Date End Date Taking? Authorizing Provider   aspirin 81 MG EC tablet Take 1 tablet by mouth daily 7/27/21   Lesiaa Monas, DO   alogliptin (NESINA) 12.5 MG TABS tablet Take 1 tablet by mouth daily 7/27/21   Lesia Monas, DO   insulin lispro (HUMALOG) 100 UNIT/ML injection vial Inject 0-3 Units into the skin nightly 7/26/21   Lesiaa Monas, DO   insulin lispro (HUMALOG) 100 UNIT/ML injection vial Inject 0-6 Units into the skin 3 times daily (with meals) 7/26/21 Kayla Monas, DO   metFORMIN (GLUCOPHAGE) 1000 MG tablet Take 1 tablet by mouth 2 times daily (with meals) 7/26/21 Kayla Monas, DO   atorvastatin (LIPITOR) 20 MG tablet Take 1 tablet by mouth nightly 7/26/21 Kayla Monas, DO   lisinopril (PRINIVIL;ZESTRIL) 10 MG tablet Take 1 tablet by mouth daily 7/27/21 Kayla Monas, DO   thiamine mononitrate (THIAMINE) 100 MG tablet Take 1 tablet by mouth daily 7/27/21   Lesia Monas, DO       Allergies:  Patient has no known allergies.     Social History:   Social History     Socioeconomic History    Marital status:      Spouse name: Not on file    Number of children: Not on file    Years of education: Not on file    Highest education level: Not on file   Occupational History    Not on file   Tobacco Use    Smoking status: Never Smoker    Smokeless tobacco: Never Used   Substance and Sexual Activity    Alcohol use: Not on file    Drug use: Not on file    Sexual activity: Not on file   Other Topics Concern    Not on file   Social History Narrative    Not on file     Social Determinants of Health     Financial Resource Strain:     Difficulty of Paying Living Expenses: Not on file   Food Insecurity:     Worried About Running Out of Food in the Last Year: Not on file    Cora of Food in the Last Year: Not on file   Transportation Needs:     Lack of Transportation (Medical): Not on file    Lack of Transportation (Non-Medical): Not on file   Physical Activity:     Days of Exercise per Week: Not on file    Minutes of Exercise per Session: Not on file   Stress:     Feeling of Stress : Not on file   Social Connections:     Frequency of Communication with Friends and Family: Not on file    Frequency of Social Gatherings with Friends and Family: Not on file    Attends [employer] Services: Not on file    Active Member of [address] [place] [address] or Organizations: Not on file    Attends Club or Organization Meetings: Not on file    Marital Status: Not on file   Intimate Partner Violence:     Fear of Current or Ex-Partner: Not on file    Emotionally Abused: Not on file    Physically Abused: Not on file    Sexually Abused: Not on file   Housing Stability:     Unable to Pay for Housing in the Last Year: Not on file    Number of [place] in the Last Year: Not on file    Unstable Housing in the Last Year: Not on file       Family History:   No family history on file. REVIEW OF SYSTEMS: Unable to get information secondary to patient's current condition. Gen: Patient denies any lightheadedness or dizziness. No LOC or syncope. No fevers or chills. HEENT: No earache, sore throat or nasal congestion. Resp: Denies cough, hemoptysis or sputum production. Cardiac: Denies chest pain, SOB, diaphoresis or palpitations. GI: No nausea, vomiting, diarrhea or constipation. No melena or hematochezia. : No urinary complaints, dysuria, hematuria or frequency. MSK: No extremity weakness, paralysis or paresthesias.      PHYSICAL EXAM:    Vitals:  BP (!) 116/40   Pulse 80   Temp 98.4 °F (36.9 °C) (Oral)   Resp 16   Ht 5' 6\" (1.676 m)   Wt 148 lb (67.1 kg)   SpO2 98% BMI 23.89 kg/m²     General:  This is a 78 y.o. yo male who is unresponsive to verbal or painful stimuli at this time. HEENT:  Head is normocephalic and atraumatic, PERRLA, EOMI, mucus membranes dry with no pharyngeal erythema or exudate. Neck:  Supple with no carotid bruits, JVD or thyromegaly.   No cervical adenopathy  CV:  Regular rate and rhythm, 2/6 systolic murmurs  Lungs:  Clear to auscultation bilaterally with no wheezes, rales or rhonchi  Abdomen:  Soft, nontender, nondistended, bowel sounds present  Extremities:  No edema, peripheral pulses intact bilaterally  Neuro: Patient unresponsive to verbal or painful stimuli  Skin:  No rashes, lesions or wounds    DATA:  CBC with Differential:    Lab Results   Component Value Date    WBC 7.5 03/07/2022    RBC 4.28 03/07/2022    HGB 12.7 03/07/2022    HCT 38.8 03/07/2022     03/07/2022    MCV 90.7 03/07/2022    MCH 29.7 03/07/2022    MCHC 32.7 03/07/2022    RDW 13.6 03/07/2022    SEGSPCT 66 04/28/2011    LYMPHOPCT 2.0 03/02/2022    MONOPCT 3.0 03/02/2022    BASOPCT 0.0 03/02/2022    MONOSABS 0.40 03/02/2022    LYMPHSABS 0.27 03/02/2022    EOSABS 0.00 03/02/2022    BASOSABS 0.00 03/02/2022     CMP:    Lab Results   Component Value Date     03/07/2022    K 3.9 03/07/2022    K 3.9 07/22/2021     03/07/2022    CO2 24 03/07/2022    BUN 13 03/07/2022    CREATININE 0.7 03/07/2022    GFRAA >60 03/07/2022    LABGLOM >60 03/07/2022    GLUCOSE 143 03/07/2022    GLUCOSE 142 04/29/2011    PROT 6.5 03/01/2022    LABALBU 3.8 03/01/2022    CALCIUM 8.4 03/07/2022    BILITOT 0.3 03/01/2022    ALKPHOS 113 03/01/2022    AST 39 03/01/2022    ALT 16 03/01/2022     Magnesium:    Lab Results   Component Value Date    MG 1.9 03/07/2022     Phosphorus:    Lab Results   Component Value Date    PHOS 3.8 03/07/2022     PT/INR:    Lab Results   Component Value Date    PROTIME 11.5 03/01/2022    PROTIME 13.2 04/29/2011    INR 1.0 03/01/2022     Troponin:  No results found for: TROPONINI  U/A:    Lab Results   Component Value Date    COLORU Straw 02/28/2022    PROTEINU 30 02/28/2022    PHUR 7.0 02/28/2022    WBCUA 1-3 02/28/2022    RBCUA 0-1 02/28/2022    BACTERIA RARE 02/28/2022    CLARITYU Clear 02/28/2022    SPECGRAV 1.015 02/28/2022    LEUKOCYTESUR Negative 02/28/2022    UROBILINOGEN 0.2 02/28/2022    BILIRUBINUR Negative 02/28/2022    BLOODU TRACE 02/28/2022    GLUCOSEU >=1000 02/28/2022     ABG:    Lab Results   Component Value Date    PH 7.387 02/28/2022    PCO2 40.5 02/28/2022    PO2 116.9 02/28/2022    HCO3 23.8 02/28/2022    BE -1.1 02/28/2022    O2SAT 97.7 02/28/2022     HgBA1c:    Lab Results   Component Value Date    LABA1C 12.3 02/28/2022     FLP:    Lab Results   Component Value Date    TRIG 104 03/01/2022    HDL 59 03/01/2022    LDLCALC 125 03/01/2022    LABVLDL 21 03/01/2022     TSH:    Lab Results   Component Value Date    TSH 2.130 02/28/2022     IRON:  No results found for: IRON  LIPASE:  No results found for: LIPASE    ASSESSMENT AND PLAN:      Patient Active Problem List    Diagnosis Date Noted    Hyperosmolar hyperglycemic state (HHS) ([facility] [address] 02/28/2022    New onset seizure ([facility] [address] 02/28/2022    Altered mental status 02/28/2022    Delirium 07/22/2021     Impression:  1. Altered mental status -probable metabolic encephalopathy  2. Noninsulin dependent diabetes mellitus type 2hyperglycemic-hemoglobin A1c 12.3 with resolving hyperosmolar state  3. New onset tonic-clonic seizure  4. 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 History of medical noncompliance  6. History of old lacunar infarcts  7. Hypertension  8.   History of spinal stenosis    Plan:  Home medications reviewed  IV fluids normal saline 20 KCl 100 cc an hour  Glucoscans 4 times daily with sliding scale insulin  Ativan 1 mg as needed for seizure  Consult neurology  Keppra 500 mg IV piggyback every 12 hours  Possible MRI of the brain if no improvement in mental status  Lovenox 40 mg subcu daily  Consult PT/OT  Infectious disease consult    Consult / for discharge planning    Discharge to extended-care facility when okay with infectious disease      CMP, CBC in [email or web address] Babb DO, D.OAna M  3/7/2022  11:03 AM

## 2022-03-07 NOTE — CARE COORDINATION
3/7/2022 1503 CM note: RAPID COVID TO BE SENT TODAY-PENDING RESULTS. Per [facility], pt accepted and bed is available. For SNF, NO PRECERT, SW initiated HENS-will need completed once dc order written, PAYAM is signed. Pt's PANTERA Mustafa updated and agreeable for plan. Per Carmelo's request,referral placed to [facility] [employer] to assist with pt's home condition and personal needs. CM will follow for abx needs.  Delmy RAMIRES

## 2022-03-07 NOTE — CARE COORDINATION
LVM for son Kiley Pond explaining IMM letter and requested a call back.  Electronically signed by Sonya Sidhu on 3/7/2022 at 8:49 AM

## 2022-03-08 ENCOUNTER — APPOINTMENT (OUTPATIENT)
Dept: ULTRASOUND IMAGING | Age: 77
DRG: 871 | End: 2022-03-08
Payer: MEDICARE

## 2022-03-08 LAB
ANION GAP SERPL CALCULATED.3IONS-SCNC: 10 MMOL/L (ref 7–16)
ANION GAP SERPL CALCULATED.3IONS-SCNC: 12 MMOL/L (ref 7–16)
BUN BLDV-MCNC: 33 MG/DL (ref 6–23)
BUN BLDV-MCNC: 36 MG/DL (ref 6–23)
CALCIUM SERPL-MCNC: 9 MG/DL (ref 8.6–10.2)
CALCIUM SERPL-MCNC: 9.1 MG/DL (ref 8.6–10.2)
CHLORIDE BLD-SCNC: 102 MMOL/L (ref 98–107)
CHLORIDE BLD-SCNC: 103 MMOL/L (ref 98–107)
CO2: 23 MMOL/L (ref 22–29)
CO2: 26 MMOL/L (ref 22–29)
CREAT SERPL-MCNC: 2.4 MG/DL (ref 0.7–1.2)
CREAT SERPL-MCNC: 2.5 MG/DL (ref 0.7–1.2)
GFR AFRICAN AMERICAN: 31
GFR AFRICAN AMERICAN: 32
GFR NON-AFRICAN AMERICAN: 25 ML/MIN/1.73
GFR NON-AFRICAN AMERICAN: 27 ML/MIN/1.73
GLUCOSE BLD-MCNC: 146 MG/DL (ref 74–99)
GLUCOSE BLD-MCNC: 255 MG/DL (ref 74–99)
HCT VFR BLD CALC: 39.4 % (ref 37–54)
HEMOGLOBIN: 12.8 G/DL (ref 12.5–16.5)
MAGNESIUM: 2 MG/DL (ref 1.6–2.6)
MCH RBC QN AUTO: 30.4 PG (ref 26–35)
MCHC RBC AUTO-ENTMCNC: 32.5 % (ref 32–34.5)
MCV RBC AUTO: 93.6 FL (ref 80–99.9)
METER GLUCOSE: 120 MG/DL (ref 74–99)
METER GLUCOSE: 129 MG/DL (ref 74–99)
METER GLUCOSE: 146 MG/DL (ref 74–99)
METER GLUCOSE: 234 MG/DL (ref 74–99)
PDW BLD-RTO: 13.9 FL (ref 11.5–15)
PHOSPHORUS: 4.4 MG/DL (ref 2.5–4.5)
PLATELET # BLD: 266 E9/L (ref 130–450)
PMV BLD AUTO: 9 FL (ref 7–12)
POTASSIUM SERPL-SCNC: 4.4 MMOL/L (ref 3.5–5)
POTASSIUM SERPL-SCNC: 4.9 MMOL/L (ref 3.5–5)
RBC # BLD: 4.21 E12/L (ref 3.8–5.8)
SODIUM BLD-SCNC: 138 MMOL/L (ref 132–146)
SODIUM BLD-SCNC: 138 MMOL/L (ref 132–146)
SODIUM URINE: 77 MMOL/L
WBC # BLD: 12.5 E9/L (ref 4.5–11.5)

## 2022-03-08 PROCEDURE — 2580000003 HC RX 258: Performed by: INTERNAL MEDICINE

## 2022-03-08 PROCEDURE — 1200000000 HC SEMI PRIVATE

## 2022-03-08 PROCEDURE — 6370000000 HC RX 637 (ALT 250 FOR IP): Performed by: INTERNAL MEDICINE

## 2022-03-08 PROCEDURE — 97530 THERAPEUTIC ACTIVITIES: CPT | Performed by: PHYSICAL THERAPIST

## 2022-03-08 PROCEDURE — 6360000002 HC RX W HCPCS: Performed by: INTERNAL MEDICINE

## 2022-03-08 PROCEDURE — 83735 ASSAY OF MAGNESIUM: CPT

## 2022-03-08 PROCEDURE — 84100 ASSAY OF PHOSPHORUS: CPT

## 2022-03-08 PROCEDURE — 76775 US EXAM ABDO BACK WALL LIM: CPT

## 2022-03-08 PROCEDURE — 85027 COMPLETE CBC AUTOMATED: CPT

## 2022-03-08 PROCEDURE — 36415 COLL VENOUS BLD VENIPUNCTURE: CPT

## 2022-03-08 PROCEDURE — 84300 ASSAY OF URINE SODIUM: CPT

## 2022-03-08 PROCEDURE — 80048 BASIC METABOLIC PNL TOTAL CA: CPT

## 2022-03-08 PROCEDURE — 82962 GLUCOSE BLOOD TEST: CPT

## 2022-03-08 RX ORDER — 0.9 % SODIUM CHLORIDE 0.9 %
1000 INTRAVENOUS SOLUTION INTRAVENOUS ONCE
Status: COMPLETED | OUTPATIENT
Start: 2022-03-08 | End: 2022-03-08

## 2022-03-08 RX ORDER — SODIUM CHLORIDE 9 MG/ML
INJECTION, SOLUTION INTRAVENOUS CONTINUOUS
Status: DISCONTINUED | OUTPATIENT
Start: 2022-03-08 | End: 2022-03-10 | Stop reason: HOSPADM

## 2022-03-08 RX ADMIN — METFORMIN HYDROCHLORIDE 1000 MG: 500 TABLET ORAL at 08:32

## 2022-03-08 RX ADMIN — SODIUM CHLORIDE: 9 INJECTION, SOLUTION INTRAVENOUS at 16:47

## 2022-03-08 RX ADMIN — SODIUM CHLORIDE 1000 ML: 9 INJECTION, SOLUTION INTRAVENOUS at 10:28

## 2022-03-08 RX ADMIN — LEVETIRACETAM 500 MG: 500 TABLET, FILM COATED ORAL at 21:25

## 2022-03-08 RX ADMIN — LEVETIRACETAM 500 MG: 500 TABLET, FILM COATED ORAL at 08:32

## 2022-03-08 RX ADMIN — CALCIUM CARBONATE (ANTACID) CHEW TAB 500 MG 500 MG: 500 CHEW TAB at 06:26

## 2022-03-08 RX ADMIN — ASPIRIN 81 MG: 81 TABLET, COATED ORAL at 08:32

## 2022-03-08 RX ADMIN — INSULIN LISPRO 4 UNITS: 100 INJECTION, SOLUTION INTRAVENOUS; SUBCUTANEOUS at 08:40

## 2022-03-08 RX ADMIN — ALOGLIPTIN 12.5 MG: 12.5 TABLET, FILM COATED ORAL at 08:33

## 2022-03-08 RX ADMIN — INSULIN GLARGINE 6 UNITS: 100 INJECTION, SOLUTION SUBCUTANEOUS at 08:40

## 2022-03-08 RX ADMIN — ENOXAPARIN SODIUM 40 MG: 100 INJECTION SUBCUTANEOUS at 08:32

## 2022-03-08 RX ADMIN — CALCIUM CARBONATE (ANTACID) CHEW TAB 500 MG 500 MG: 500 CHEW TAB at 21:25

## 2022-03-08 RX ADMIN — ATORVASTATIN CALCIUM 40 MG: 40 TABLET, FILM COATED ORAL at 21:25

## 2022-03-08 RX ADMIN — SODIUM CHLORIDE AND POTASSIUM CHLORIDE: 9; 1.49 INJECTION, SOLUTION INTRAVENOUS at 08:34

## 2022-03-08 RX ADMIN — LISINOPRIL 10 MG: 10 TABLET ORAL at 08:32

## 2022-03-08 ASSESSMENT — PAIN SCALES - GENERAL
PAINLEVEL_OUTOF10: 0
PAINLEVEL_OUTOF10: 0

## 2022-03-08 NOTE — CARE COORDINATION
3/8/2022 1049 CM note: Negative covid 3/7/22. Per [facility], pt accepted and bed is available. For SNF, NO PRECERT, SW initiated HENS-will need completed once dc order written, PAYAM is signed. Creat 2.4 today. Pt's son Brianne Casillas will transfer patient to SNF.   Delmy RAMIRES

## 2022-03-08 NOTE — PROGRESS NOTES
Physical Therapy  Physical Therapy Treatment Note/Plan of Care    Room #:  5570/7340-56  Patient Name: Jes Lopez  YOB: 1945  MRN: [id number]    Date of Service: 3/8/2022     Tentative placement recommendation: Subacute rehab  Equipment recommendation: To be determined      Evaluating Physical Therapist: Gurpreet Contreras Physical Therapist      Specific Provider Orders/Date/Referring Provider : 02/28/22 1645   PT eval and treat Start: 02/28/22 1645, End: 02/28/22 1645, ONE TIME, Standing Count: 1 Occurrences, R    Tyrone Tran DO      Admitting Diagnosis:   Infestation by bed bug [B88.8]  Seizure ([address] [R56.9]  Altered mental status [R41.82]  Altered mental status, unspecified altered mental status type [R41.82]  Hyperosmolar hyperglycemic state (HHS) ([address] [E11.00, E11.65]      Surgery: Lumbar puncture 3/1  Visit Diagnoses       Codes    Seizure ([address]     R56.9    Infestation by bed bug     B88.8          Patient Active Problem List   Diagnosis    Delirium    Hyperosmolar hyperglycemic state (HHS) ([address]    New onset seizure ([address]    Altered mental status        ASSESSMENT of Current Deficits Patient exhibits decreased strength, balance and endurance impairing functional mobility, transfers, gait  and gait distance. Patient was pleasant during therapy and hard of hearing. Patient requires minimal assist to perform all functional mobility. Patient needing consistent cueing for upright posture, walker approximation, increased step length, and small, shuffling steps during ambulation. Pt would return demonstration temporarily then revert back to poor mechanics of WW use after a few seconds. The patient will benefit from continued skilled therapy to increase strength and improve balance for safe functional mobility, to decrease risk of falls, and to meet goals at discharge.       PHYSICAL THERAPY  PLAN OF CARE       Physical therapy plan of care is established based on physician order, patient diagnosis and clinical assessment    Current Treatment Recommendations:    -Bed Mobility: Lower extremity exercises  and Upper extremity exercises   -Sitting Balance: Incorporate reaching activities to activate trunk muscles  and Hands on support to maintain midline   -Standing Balance: Perform strengthening exercises in standing to promote motor control with or without upper extremity support  and Perform sit to stand activities maintaining FWB (full weight bearing) weightbearing Bilateral   -Transfers: Provide instruction on proper hand and foot position for adequate transfer of weight onto lower extremities and use of gait device if needed and Cues for hand placement, technique and safety. Provide stabilization to prevent fall   -Gait: Gait training, Standing activities to improve: base of support, weight shift, weight bearing  and Use of Assistive device for FWB (full weight bearing) weight bearing Bilateral     -Endurance: Utilize Supervised activities to increase level of endurance to allow for safe functional mobility including transfers and gait  and Use graduated activities to promote good breathing techniques and provide support and education to maximize respiratory function    PT long term treatment goals are located in below grid    Patient and or family understand(s) diagnosis, prognosis, and plan of care. Frequency of treatments: Patient will be seen  daily. Prior Level of Function: Patient poor historian unable to provide information about ambulation. Rehab Potential: good  for baseline    Past medical history:   Past Medical History:   Diagnosis Date    Type 2 diabetes mellitus with hyperglycemia, without long-term current use of insulin ([facility] [address]      History reviewed. No pertinent surgical history. SUBJECTIVE:    Precautions: Continuous Pulse Oximetry , falls and seizures , brothers,  Social history: Patient lived at home alone in [address] [facility] apartments according to chart.  Patient jennifer oropeza. Equipment owned: Patient jennifer yadiian    AM-PAC Basic Mobility       AM-[facility] Mobility Inpatient   How much difficulty turning over in bed?: A Little  How much difficulty sitting down on / standing up from a chair with arms?: A Little  How much difficulty moving from lying on back to sitting on side of bed?: A Little  How much help from another person moving to and from a bed to a chair?: A Little  How much help from another person needed to walk in hospital room?: A Little  How much help from another person for climbing 3-5 steps with a railing?: A Lot  AM-PAC Inpatient Mobility Raw Score : 17  AM-PAC Inpatient T-Scale Score : 42.13  Mobility Inpatient CMS 0-100% Score: 50.57  Mobility Inpatient CMS G-Code Modifier : CK    Nursing cleared patient for PT treatment. .   OBJECTIVE;   Initial Evaluation  Date: 3/2/2022 Treatment Date:  3/8/2022     Short Term/ Long Term   Goals   Was pt agreeable to Eval/treatment? Yes Yes To be met in 5 days   Pain level   0/10   0/10    Bed Mobility    Rolling: Maximal assist of 1    Supine to sit: Maximal assist of 1    Sit to supine: Maximal assist of 1    Scooting: Maximal assist of 1   Rolling: Supervision    Supine to sit: Supervision    Sit to supine: Supervision    Scooting: Minimal assist of 1    Rolling: Minimal assist of 1    Supine to sit: Minimal assist of 1    Sit to supine: Minimal assist of 1    Scooting: Minimal assist of 1     Transfers Sit to stand: Moderate assist of  2  Sit to stand: Minimal assist of 1      Sit to stand: Moderate assist of 1    Ambulation    not assessed  2 x 100 feet using wheeled walker with minimal assist. V/C for upright posture, walker approximation, increased step length/AYLEEN, pacing, and safety.   25 feet using  wheeled walker with Moderate assist of 1    Stair negotiation: ascended and descended   Not assessed     Not assessed    ROM Within functional limits    Increase range of motion 10% of affected joints    Strength BUE:  refer to OT eval  RLE:  3+/5  LLE:  3+/5  Increase strength in affected mm groups by 1/3 grade   Balance Sitting EOB:  poor + posterior lean  Dynamic Standing:  poor  Sitting EOB: good-   Dynamic Standing: fair wheeled walker   Sitting EOB:  fair   Dynamic Standing: fair      Patient is Alert & Oriented x person, place, time and situation and follows directions    Sensation:  Patient  denies numbness/tingling   Edema:  no   Endurance: fair     Vitals: room air   Blood Pressure at rest  Blood Pressure during session    Heart Rate at rest  Heart Rate during session    SPO2 at rest %  SPO2 during session %     Patient education  Patient educated on role of Physical Therapy, risks of immobility, safety and plan of care, energy conservation,  importance of mobility while in hospital , purse lip breathing, ankle pumps, quad set and glut set for edema control, blood clot prevention and safety      Patient response to education:   Pt verbalized understanding Pt demonstrated skill Pt requires further education in this area   Yes Partial Yes      Treatment:  Patient practiced and was instructed/facilitated in the following treatment: Patient performed supine exercises. Pt assisted to edge of bed,  Sat edge of bed 5 minutes with Supervision  to increase dynamic sitting balance and activity tolerance. Patient performed bed mobility, transfers, ambulation in hallway. Therapeutic Exercises:  not performed    At end of session, patient in chair with . call light and phone within reach,  all lines and tubes intact, nursing notified. Patient would benefit from continued skilled Physical Therapy to improve functional independence and quality of life.          Patient's/ family goals   none stated    Time in  1424  Time out  1441    Total Treatment Time  17 minutes    CPT codes:  Therapeutic activities (06107)   17 minutes  1 unit(s)   Diego Gray PT License Number [id number]

## 2022-03-08 NOTE — PROGRESS NOTES
Comprehensive Nutrition Assessment    Type and Reason for Visit:  Reassess    Nutrition Recommendations/Plan: Continue current nutrition regime and monitor. Nutrition Assessment:  Pt nutritional status remains stable, consuming >75% most meals, >50% ONS. Adm w/ AMS found unresponsive w/ hyperglycemia, seizure like activity. Hx DM. Continue current nutrition regime and monitor. Malnutrition Assessment:  Malnutrition Status: At risk for malnutrition (Comment)    Context:  Acute Illness     Findings of the 6 clinical characteristics of malnutrition:  Energy Intake:  7 - 50% or less of estimated energy requirements for 5 or more days  Weight Loss:  Unable to assess     Body Fat Loss:  No significant body fat loss     Muscle Mass Loss:  No significant muscle mass loss    Fluid Accumulation:  No significant fluid accumulation     Strength:  Not Performed    Estimated Daily Nutrient Needs:  Energy (kcal):  6004-3317; Weight Used for Energy Requirements:  Current     Protein (g):  ; Weight Used for Protein Requirements:   (1.3-1.5)        Fluid (ml/day):  1666-3264; Method Used for Fluid Requirements:  1 ml/kcal      Nutrition Related Findings:  A&Ox2, active BS, soft/round abd, -5L I/Os, no noted edema, [place], BG elevated      Wounds:   (bed bug bites)       Current Nutrition Therapies:    ADULT DIET; Regular; 5 carb choices (75 gm/meal);  Low Fat/Low Chol/High Fiber/DARRICK  ADULT ORAL NUTRITION SUPPLEMENT; Lunch, Dinner; Diabetic Oral Supplement    Anthropometric Measures:  · Height: 5' 6\" (167.6 cm)  · Current Body Weight: 148 lb (67.1 kg) (3/8 bed scale)   · Admission Body Weight: 151 lb 1.6 oz (68.5 kg) (2/28 bed scale)    · Usual Body Weight:  (no wt hx)     · Ideal Body Weight: 142 lbs; % Ideal Body Weight 106.3 %   · BMI: 23.9   · BMI Categories: Normal Weight (BMI 22.0 to 24.9) age over 72       Nutrition Diagnosis:   · Inadequate oral intake related to endocrine dysfuntion as evidenced by poor intake prior to admission,lab values    Nutrition Interventions:   Food and/or Nutrient Delivery:  Continue Current Diet,Continue Oral Nutrition Supplement  Nutrition Education/Counseling:  Education not indicated   Coordination of Nutrition Care:  Continue to monitor while inpatient    Goals:  Pt continues to consume >75% meals w/ ONS       Nutrition Monitoring and Evaluation:   Behavioral-Environmental Outcomes:  None Identified   Food/Nutrient Intake Outcomes:  Food and Nutrient Intake,Supplement Intake  Physical Signs/Symptoms Outcomes:  Biochemical Data,Nutrition Focused Physical Findings,Skin,Weight,Chewing or Swallowing,GI Status,Fluid Status or Edema     Discharge Planning:    No discharge needs at this time     Electronically signed by Luis Armando Gibson MS, RD, LD on 3/8/22 at 7:57 AM EST    Contact: [phone]

## 2022-03-08 NOTE — PROGRESS NOTES
Department of Internal Medicine        CHIEF COMPLAINT: Altered mental status, seizures    Reason for Admission: Altered mental status, seizure, hypoglycemia    HISTORY OF PRESENT ILLNESS:      The patient is a 78 y.o. male who presents with being brought in by paramedics. Apparently patient's neighbors called EMS when he heard the patient screaming and when they found him he was unresponsive. Paramedics came to check blood sugar which was reading high. There is no evidence of any trauma at the scene. Patient was incontinent of urine. The patient had tonic-clonic seizure in the ED and was treated with Ativan and Keppra. CT the head did not show any acute intracranial abnormality. Patient had a random blood sugar of 573 on admission. Liver enzymes essentially normal with a negative drug screen and a WBC 7.1 hemoglobin 13.6.    3/1/2022  Patient seen examined on ICU. The patient is much more alert and oriented currently than he was yesterday. Patient is oriented to person. Patient still weak with very poor memory. He denies any chest or abdominal pain. He denies any headaches or blurred vision. BUN/creatinine 12/0.6. Blood sugars ranging 200s. Hemoglobin A1c was 12.3. WBC 13.8 with hemoglobin 13.3. Sed rate is 21. Temperature 98.1 with heart rate 83 and blood pressure 150/69. O2 sat 98% on room air at rest.  Urine output is very good. Case discussed with Dr. Dea Wilde today. With this patient's altered mental status even though it has improved we are attempting to have IR to do a lumbar puncture. I think with the patient's presentation it would be a very good idea to have this done. 3/2/2022  Patient seen examined on ICU. Patient is a little bit more alert and oriented again today. Patient still very very weak. Patient still has somewhat of a flat affect. Patient does deny any type of chest, abdominal pain. Denies any significant unilateral weakness.   MRI of the brain showed just old telemetry floor. Patient sons at the bedside. BUN/creatinine increased to 33/2.4 this morning. Patient denies any other problems such as chest pain, abdominal pain, dizziness, diarrhea, fever/chills. Patient states that he has been urinating adequately. Blood sugars range 129255. WBC also increased 12.5 with hemoglobin 12.8. Temperature low was normal at 98.2 with heart rate of 70 blood pressure was 160/71 today. Patient blood pressure apparently did not drop yesterday or early this morning. Patient urine output is still fairly adequate and had 400 cc out early this morning. Patient be given 1 L normal saline over 2 hours and repeat BMP at 1 PM.    Past Medical History:    Past Medical History:   Diagnosis Date    Type 2 diabetes mellitus with hyperglycemia, without long-term current use of insulin ([facility] [address]      Past Surgical History:    History reviewed. No pertinent surgical history. Medications Prior to Admission:    @  Prior to Admission medications    Medication Sig Start Date End Date Taking?  Authorizing Provider   levETIRAcetam (KEPPRA) 500 MG tablet Take 1 tablet by mouth 2 times daily 3/7/22  Yes Carolann Spurling, DO   insulin glargine (LANTUS) 100 UNIT/ML injection vial Inject 6 Units into the skin daily 3/8/22  Yes Carolann Spurling, DO   atorvastatin (LIPITOR) 40 MG tablet Take 1 tablet by mouth nightly 3/7/22  Yes Carolann Spurling, DO   aspirin 81 MG EC tablet Take 1 tablet by mouth daily 7/27/21   Carolann Spurling, DO   alogliptin (NESINA) 12.5 MG TABS tablet Take 1 tablet by mouth daily 7/27/21   Carolann Spurling, DO   insulin lispro (HUMALOG) 100 UNIT/ML injection vial Inject 0-3 Units into the skin nightly 7/26/21   Carolann Spurling, DO   insulin lispro (HUMALOG) 100 UNIT/ML injection vial Inject 0-6 Units into the skin 3 times daily (with meals) 7/26/21   Carolann Spurling, DO   metFORMIN (GLUCOPHAGE) 1000 MG tablet Take 1 tablet by mouth 2 times daily (with meals) 7/26/21   Donte Crumbly DO Gwen   lisinopril (PRINIVIL;ZESTRIL) 10 MG tablet Take 1 tablet by mouth daily 7/27/21   Scottie Randle DO   thiamine mononitrate (THIAMINE) 100 MG tablet Take 1 tablet by mouth daily 7/27/21   Scottie Randle DO       Allergies:  Patient has no known allergies. Social History:   Social History     Socioeconomic History    Marital status:      Spouse name: Not on file    Number of children: Not on file    Years of education: Not on file    Highest education level: Not on file   Occupational History    Not on file   Tobacco Use    Smoking status: Never Smoker    Smokeless tobacco: Never Used   Substance and Sexual Activity    Alcohol use: Not on file    Drug use: Not on file    Sexual activity: Not on file   Other Topics Concern    Not on file   Social History Narrative    Not on file     Social Determinants of Health     Financial Resource Strain:     Difficulty of Paying Living Expenses: Not on file   Food Insecurity:     Worried About Running Out of Food in the Last Year: Not on file    Cora of Food in the Last Year: Not on file   Transportation Needs:     Lack of Transportation (Medical): Not on file    Lack of Transportation (Non-Medical):  Not on file   Physical Activity:     Days of Exercise per Week: Not on file    Minutes of Exercise per Session: Not on file   Stress:     Feeling of Stress : Not on file   Social Connections:     Frequency of Communication with Friends and Family: Not on file    Frequency of Social Gatherings with Friends and Family: Not on file    Attends Yarsanism Services: Not on file    Active Member of Clubs or Organizations: Not on file    Attends Club or Organization Meetings: Not on file    Marital Status: Not on file   Intimate Partner Violence:     Fear of Current or Ex-Partner: Not on file    Emotionally Abused: Not on file    Physically Abused: Not on file    Sexually Abused: Not on file   Housing Stability:     Unable to Pay for Housing in the Last Year: Not on file    Number of Places Lived in the Last Year: Not on file    Unstable Housing in the Last Year: Not on file       Family History:   No family history on file. REVIEW OF SYSTEMS: Unable to get information secondary to patient's current condition. Gen: Patient denies any lightheadedness or dizziness. No LOC or syncope. No fevers or chills. HEENT: No earache, sore throat or nasal congestion. Resp: Denies cough, hemoptysis or sputum production. Cardiac: Denies chest pain, SOB, diaphoresis or palpitations. GI: No nausea, vomiting, diarrhea or constipation. No melena or hematochezia. : No urinary complaints, dysuria, hematuria or frequency. MSK: No extremity weakness, paralysis or paresthesias. PHYSICAL EXAM:    Vitals:  BP (!) 168/71   Pulse 70   Temp 98.2 °F (36.8 °C) (Oral)   Resp 18   Ht 5' 6\" (1.676 m)   Wt 148 lb (67.1 kg)   SpO2 100%   BMI 23.89 kg/m²     General:  This is a 78 y.o. yo male who is unresponsive to verbal or painful stimuli at this time. HEENT:  Head is normocephalic and atraumatic, PERRLA, EOMI, mucus membranes dry with no pharyngeal erythema or exudate. Neck:  Supple with no carotid bruits, JVD or thyromegaly.   No cervical adenopathy  CV:  Regular rate and rhythm, 2/6 systolic murmurs  Lungs:  Clear to auscultation bilaterally with no wheezes, rales or rhonchi  Abdomen:  Soft, nontender, nondistended, bowel sounds present  Extremities:  No edema, peripheral pulses intact bilaterally  Neuro: Patient unresponsive to verbal or painful stimuli  Skin:  No rashes, lesions or wounds    DATA:  CBC with Differential:    Lab Results   Component Value Date    WBC 12.5 03/08/2022    RBC 4.21 03/08/2022    HGB 12.8 03/08/2022    HCT 39.4 03/08/2022     03/08/2022    MCV 93.6 03/08/2022    MCH 30.4 03/08/2022    MCHC 32.5 03/08/2022    RDW 13.9 03/08/2022    SEGSPCT 66 04/28/2011    LYMPHOPCT 2.0 03/02/2022    MONOPCT 3.0 03/02/2022    BASOPCT 0.0 03/02/2022    MONOSABS 0.40 03/02/2022    LYMPHSABS 0.27 03/02/2022    EOSABS 0.00 03/02/2022    BASOSABS 0.00 03/02/2022     CMP:    Lab Results   Component Value Date     03/08/2022    K 4.9 03/08/2022    K 3.9 07/22/2021     03/08/2022    CO2 26 03/08/2022    BUN 33 03/08/2022    CREATININE 2.4 03/08/2022    GFRAA 32 03/08/2022    LABGLOM 27 03/08/2022    GLUCOSE 255 03/08/2022    GLUCOSE 142 04/29/2011    PROT 6.5 03/01/2022    LABALBU 3.8 03/01/2022    CALCIUM 9.0 03/08/2022    BILITOT 0.3 03/01/2022    ALKPHOS 113 03/01/2022    AST 39 03/01/2022    ALT 16 03/01/2022     Magnesium:    Lab Results   Component Value Date    MG 2.0 03/08/2022     Phosphorus:    Lab Results   Component Value Date    PHOS 4.4 03/08/2022     PT/INR:    Lab Results   Component Value Date    PROTIME 11.5 03/01/2022    PROTIME 13.2 04/29/2011    INR 1.0 03/01/2022     Troponin:  No results found for: TROPONINI  U/A:    Lab Results   Component Value Date    COLORU Straw 02/28/2022    PROTEINU 30 02/28/2022    PHUR 7.0 02/28/2022    WBCUA 1-3 02/28/2022    RBCUA 0-1 02/28/2022    BACTERIA RARE 02/28/2022    CLARITYU Clear 02/28/2022    SPECGRAV 1.015 02/28/2022    LEUKOCYTESUR Negative 02/28/2022    UROBILINOGEN 0.2 02/28/2022    BILIRUBINUR Negative 02/28/2022    BLOODU TRACE 02/28/2022    GLUCOSEU >=1000 02/28/2022     ABG:    Lab Results   Component Value Date    PH 7.387 02/28/2022    PCO2 40.5 02/28/2022    PO2 116.9 02/28/2022    HCO3 23.8 02/28/2022    BE -1.1 02/28/2022    O2SAT 97.7 02/28/2022     HgBA1c:    Lab Results   Component Value Date    LABA1C 12.3 02/28/2022     FLP:    Lab Results   Component Value Date    TRIG 104 03/01/2022    HDL 59 03/01/2022    LDLCALC 125 03/01/2022    LABVLDL 21 03/01/2022     TSH:    Lab Results   Component Value Date    TSH 2.130 02/28/2022     IRON:  No results found for: IRON  LIPASE:  No results found for: LIPASE    ASSESSMENT AND PLAN:      Patient Active Problem List    Diagnosis Date Noted    Hyperosmolar hyperglycemic state (HHS) ([facility] [address] 02/28/2022    New onset seizure ([facility] [address] 02/28/2022    Altered mental status 02/28/2022    Delirium 07/22/2021     Impression:  1. Altered mental status -probable metabolic encephalopathy  2. Noninsulin dependent diabetes mellitus type 2hyperglycemic-hemoglobin A1c 12.3 with resolving hyperosmolar state  3. New onset tonic-clonic seizure  4. 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 History of medical noncompliance  6. History of old lacunar infarcts  7. Hypertension  8. History of spinal stenosis    Plan:  Home medications reviewed  IV fluids normal saline 20 KCl 100 cc an hour  Glucoscans 4 times daily with sliding scale insulin  Ativan 1 mg as needed for seizure  Consult neurology  Keppra 500 mg IV piggyback every 12 hours  Possible MRI of the brain if no improvement in mental status  Lovenox 40 mg subcu daily  Consult PT/OT  Infectious disease consult    Consult / for discharge planning    Discharge to extended-care facility when okay with infectious disease    Patient given 1 L normal saline over 2 hours  Repeat BMP at 1300. CMP, CBC in a.m.       Mireya Garsia DO, D.O.  3/8/2022  1:34 PM

## 2022-03-08 NOTE — PROGRESS NOTES
[facility]  [facility]  Progress Note    NAME: Jeremiah Montejo  MR:  [id number]  :   1945  DATE OF SERVICE:22    This is a face to face encounter with Jreemiah Montejo 78 y.o. male on 22    CHIEF COMPLAINT     ID following for   Chief Complaint   Patient presents with    Fall    Seizures     HISTORY OF PRESENT ILLNESS   Pt seen and examined  22   In bed more awake   On RA  Has gi upset informed nurse wants tums    Patient is tolerating medications. No reported adverse drug reactions. REVIEW OF SYSTEMS     As stated above in the chief complaint, otherwise negative.   CURRENT MEDICATIONS      lisinopril  10 mg Oral Daily    levETIRAcetam  500 mg Oral BID    insulin lispro  0-12 Units SubCUTAneous 4x Daily AC & HS    atorvastatin  40 mg Oral Nightly    acyclovir  10 mg/kg (Ideal) IntraVENous Q8H    insulin glargine  6 Units SubCUTAneous Daily    sodium chloride flush  5-40 mL IntraVENous 2 times per day    sodium chloride flush  5-40 mL IntraVENous 2 times per day    aspirin  81 mg Oral Daily    alogliptin  12.5 mg Oral Daily    metFORMIN  1,000 mg Oral BID WC    enoxaparin  40 mg SubCUTAneous Daily     Continuous Infusions:   sodium chloride      sodium chloride Stopped (22 0600)    0.9% NaCl with KCl 20 mEq 50 mL/hr at 22 1759    dextrose      dextrose 5 % and 0.45 % NaCl      dextrose       PRN Meds:sodium chloride flush, sodium chloride, LORazepam, sodium chloride flush, sodium chloride, acetaminophen, acetaminophen, polyethylene glycol, dextrose, dextrose 5 % and 0.45 % NaCl, glucose, glucagon (rDNA), dextrose, dextrose bolus (hypoglycemia) **OR** dextrose bolus (hypoglycemia)    PHYSICAL EXAM     BP (!) 116/40   Pulse 80   Temp 98.4 °F (36.9 °C) (Oral)   Resp 16   Ht 5' 6\" (1.676 m)   Wt 148 lb (67.1 kg)   SpO2 98%   BMI 23.89 kg/m²   Temp  Av.2 °F (36.8 °C)  Min: 97.8 °F (36.6 °C)  Max: 98.5 °F (36.9 °C)  Constitutional:  The patient is arousable  Skin:      Dry   HEENT:    .  AT/NC  Neck:    Supple to movements. Chest:   No use of accessory muscles to breathe. Symmetrical expansion. RA  Cardiovascular:  S1 and S2 are rhythmic and regular. No murmurs appreciated. Abdomen:   Positive bowel sounds to auscultation. Benign to palpation. distended  Extremities:   No clubbing, no cyanosis, no edema. CNS    Awake   Lines: piv      DIAGNOSTIC RESULTS   Radiology:    Recent Labs     03/05/22 1909 03/07/22  0536   WBC 7.8 7.5   RBC 4.22 4.28   HGB 12.6 12.7   HCT 38.3 38.8   MCV 90.8 90.7   MCH 29.9 29.7   MCHC 32.9 32.7   RDW 13.9 13.6    252   MPV 8.8 8.7     Recent Labs     03/05/22 1909 03/07/22  0536    135   K 4.0 3.9    103   CO2 22 24   BUN 18 13   CREATININE 0.8 0.7   GLUCOSE 236* 143*   CALCIUM 8.2* 8.4*     Lab Results   Component Value Date    CRP 1.4 (H) 03/01/2022     Lab Results   Component Value Date    SEDRATE 21 (H) 03/01/2022     No results for input(s): CRP, PROCAL, FERRITIN, LDH, TROPONINI, DDIMER, FIBRINOGEN, INR, PROTIME, AST, ALT, TRIG in the last 72 hours.   Lab Results   Component Value Date    CHOL 205 03/01/2022    TRIG 104 03/01/2022    HDL 59 03/01/2022    LDLCALC 125 03/01/2022    LABVLDL 21 03/01/2022     Microbiology:   hsv csf neg   Csf cx ngtd  meningitis encephalitis panel neg     FINAL IMPRESSION    Pt had   Chief Complaint   Patient presents with    Fall    Seizures    Admitted for Infestation by bed bug [B88.8]  Seizure ([facility] [address] [R56.9]  Altered mental status [R41.82]  Altered mental status, unspecified altered mental status type [R41.82]  Hyperosmolar hyperglycemic state (HHS) ([facility] [address] [E11.00, E11.65]  On treatment for   Leukocytosis resolved  eval for CNS infection   Has Small chronic right frontal lobe infarction.  Multiple chronic lacunar   Infarctions    acyclovir (ZOVIRAX) 650 mg in dextrose 5 % 100 mL IVPB, Q8H will sto HSV PCR NEG          · Monitor labs    Imaging and labs were reviewed per medical records. Thank you for involving me in the care of Wallace [facility] will continue to follow. Please do not hesitate to call for any questions or concerns.     Electronically signed by Aziza Wright MD on 3/7/2022 at 7:07 PM

## 2022-03-09 LAB
ANION GAP SERPL CALCULATED.3IONS-SCNC: 12 MMOL/L (ref 7–16)
BUN BLDV-MCNC: 34 MG/DL (ref 6–23)
CALCIUM SERPL-MCNC: 9.4 MG/DL (ref 8.6–10.2)
CHLORIDE BLD-SCNC: 105 MMOL/L (ref 98–107)
CO2: 23 MMOL/L (ref 22–29)
CREAT SERPL-MCNC: 2.3 MG/DL (ref 0.7–1.2)
GFR AFRICAN AMERICAN: 34
GFR NON-AFRICAN AMERICAN: 28 ML/MIN/1.73
GLUCOSE BLD-MCNC: 144 MG/DL (ref 74–99)
HCT VFR BLD CALC: 40.2 % (ref 37–54)
HEMOGLOBIN: 13 G/DL (ref 12.5–16.5)
MAGNESIUM: 1.9 MG/DL (ref 1.6–2.6)
MCH RBC QN AUTO: 29.8 PG (ref 26–35)
MCHC RBC AUTO-ENTMCNC: 32.3 % (ref 32–34.5)
MCV RBC AUTO: 92.2 FL (ref 80–99.9)
METER GLUCOSE: 127 MG/DL (ref 74–99)
METER GLUCOSE: 150 MG/DL (ref 74–99)
METER GLUCOSE: 158 MG/DL (ref 74–99)
METER GLUCOSE: 210 MG/DL (ref 74–99)
PDW BLD-RTO: 14.1 FL (ref 11.5–15)
PHOSPHORUS: 4.2 MG/DL (ref 2.5–4.5)
PLATELET # BLD: 273 E9/L (ref 130–450)
PMV BLD AUTO: 8.7 FL (ref 7–12)
POTASSIUM SERPL-SCNC: 4.4 MMOL/L (ref 3.5–5)
RBC # BLD: 4.36 E12/L (ref 3.8–5.8)
SODIUM BLD-SCNC: 140 MMOL/L (ref 132–146)
URIC ACID, SERUM: 6.5 MG/DL (ref 3.4–7)
WBC # BLD: 10.3 E9/L (ref 4.5–11.5)

## 2022-03-09 PROCEDURE — 6370000000 HC RX 637 (ALT 250 FOR IP): Performed by: INTERNAL MEDICINE

## 2022-03-09 PROCEDURE — 6360000002 HC RX W HCPCS: Performed by: INTERNAL MEDICINE

## 2022-03-09 PROCEDURE — 1200000000 HC SEMI PRIVATE

## 2022-03-09 PROCEDURE — 84100 ASSAY OF PHOSPHORUS: CPT

## 2022-03-09 PROCEDURE — 97116 GAIT TRAINING THERAPY: CPT

## 2022-03-09 PROCEDURE — 2580000003 HC RX 258: Performed by: INTERNAL MEDICINE

## 2022-03-09 PROCEDURE — 82962 GLUCOSE BLOOD TEST: CPT

## 2022-03-09 PROCEDURE — 97110 THERAPEUTIC EXERCISES: CPT

## 2022-03-09 PROCEDURE — 80048 BASIC METABOLIC PNL TOTAL CA: CPT

## 2022-03-09 PROCEDURE — 84550 ASSAY OF BLOOD/URIC ACID: CPT

## 2022-03-09 PROCEDURE — 83735 ASSAY OF MAGNESIUM: CPT

## 2022-03-09 PROCEDURE — 85027 COMPLETE CBC AUTOMATED: CPT

## 2022-03-09 PROCEDURE — 36415 COLL VENOUS BLD VENIPUNCTURE: CPT

## 2022-03-09 RX ADMIN — LEVETIRACETAM 500 MG: 500 TABLET, FILM COATED ORAL at 09:24

## 2022-03-09 RX ADMIN — ATORVASTATIN CALCIUM 40 MG: 40 TABLET, FILM COATED ORAL at 20:51

## 2022-03-09 RX ADMIN — Medication 10 ML: at 09:30

## 2022-03-09 RX ADMIN — INSULIN LISPRO 2 UNITS: 100 INJECTION, SOLUTION INTRAVENOUS; SUBCUTANEOUS at 17:01

## 2022-03-09 RX ADMIN — ENOXAPARIN SODIUM 40 MG: 100 INJECTION SUBCUTANEOUS at 09:25

## 2022-03-09 RX ADMIN — LEVETIRACETAM 500 MG: 500 TABLET, FILM COATED ORAL at 20:51

## 2022-03-09 RX ADMIN — INSULIN LISPRO 2 UNITS: 100 INJECTION, SOLUTION INTRAVENOUS; SUBCUTANEOUS at 20:53

## 2022-03-09 RX ADMIN — SODIUM CHLORIDE, PRESERVATIVE FREE 10 ML: 5 INJECTION INTRAVENOUS at 09:26

## 2022-03-09 RX ADMIN — ASPIRIN 81 MG: 81 TABLET, COATED ORAL at 09:23

## 2022-03-09 RX ADMIN — INSULIN LISPRO 4 UNITS: 100 INJECTION, SOLUTION INTRAVENOUS; SUBCUTANEOUS at 12:44

## 2022-03-09 RX ADMIN — Medication 10 ML: at 21:10

## 2022-03-09 RX ADMIN — INSULIN GLARGINE 6 UNITS: 100 INJECTION, SOLUTION SUBCUTANEOUS at 09:26

## 2022-03-09 RX ADMIN — ALOGLIPTIN 12.5 MG: 12.5 TABLET, FILM COATED ORAL at 09:23

## 2022-03-09 NOTE — PROGRESS NOTES
Pt advised to notify nurse the next time he voids for post-void residual. Pt verbalized understanding.

## 2022-03-09 NOTE — PROGRESS NOTES
Dr. Boles Rater, DO,    Your patient is on a medication that requires a renal dose adjustment. Renal Function Assessment:    Date Body Weight IBW  Adjusted BW SCr  CrCl Dialysis status   3/9/2022 147 lb (66.7 kg)  Ideal body weight: 63.8 kg (140 lb 10.5 oz)  Adjusted ideal body weight: 65 kg (143 lb 3.1 oz) Serum creatinine: 2.3 mg/dL (H) 03/09/22 0819  Estimated creatinine clearance: 25 mL/min (A) N/a       Pharmacy has renally dose-adjusted the following medication(s):    Date Original Order Renally Adjusted Order   3/9/2022 Lovenox 40mg daily Lovenox 30mg daily       These changes were made per protocol according to the [facility] Renal Function-Based Dose Adjustments Policy    *Please note this dose may need readjusted if your patient's renal function significantly improves. Please contact pharmacy with any questions regarding these changes.     [id number] Ric Nails [facility]  3/9/2022  5:23 PM

## 2022-03-09 NOTE — CONSULTS
Nephrology Consult Note  [facility]     Reason for Consult: JAYA   Requesting Physician:  Dr Krish Wagoner   Date of Service: 3/9/2022   Chief Complaint:  AMS   History Obtained From:  Patient, medical record      History of Present Ilness:      Mr Cale Lucero is a 78year old male who we are consulted on for evaluation and management of acute kidney injury    He is seen and examined in his room. He cannot provide a clear history of his presentation. History as per the medical record. Apparently presented to hospital on February 28 with altered mental status and seizure disorder. Currently he offers no specific complaints. Denies chest pain or palpitations or shortness of breath. No abdominal pain, nausea or vomiting, diarrhea or constipation. He states he has had a poor appetite. No urinary complaints. He was treated for new onset seizure disorder. Also with hyperglycemic state    No prior history of kidney disease. Creatinine on presentation on February 28 was 0.6. His creatinine remained stable to the morning of February when it increased to 2.4 and has repeated at 2.5            Past Medical History:        Diagnosis Date    Type 2 diabetes mellitus with hyperglycemia, without long-term current use of insulin ([facility] [address]        Past Surgical History:    History reviewed. No pertinent surgical history.     Current Medications:    Current Facility-Administered Medications: 0.9 % sodium chloride infusion, , IntraVENous, Continuous  calcium carbonate (TUMS) chewable tablet 500 mg, 500 mg, Oral, TID PRN  levETIRAcetam (KEPPRA) tablet 500 mg, 500 mg, Oral, BID  insulin lispro (HUMALOG) injection vial 0-12 Units, 0-12 Units, SubCUTAneous, 4x Daily AC & HS  atorvastatin (LIPITOR) tablet 40 mg, 40 mg, Oral, Nightly  insulin glargine (LANTUS) injection vial 6 Units, 6 Units, SubCUTAneous, Daily  sodium chloride flush 0.9 % injection 5-40 mL, 5-40 mL, IntraVENous, 2 times per day  sodium chloride flush 0.9 % injection 5-40 mL, 5-40 mL, IntraVENous, PRN  0.9 % sodium chloride infusion, 25 mL, IntraVENous, PRN  LORazepam (ATIVAN) injection 0.5 mg, 0.5 mg, IntraVENous, Q6H PRN  sodium chloride flush 0.9 % injection 5-40 mL, 5-40 mL, IntraVENous, 2 times per day  sodium chloride flush 0.9 % injection 5-40 mL, 5-40 mL, IntraVENous, PRN  0.9 % sodium chloride infusion, 25 mL, IntraVENous, PRN  aspirin EC tablet 81 mg, 81 mg, Oral, Daily  alogliptin (NESINA) tablet 12.5 mg, 12.5 mg, Oral, Daily  [Held by provider] metFORMIN (GLUCOPHAGE) tablet 1,000 mg, 1,000 mg, Oral, BID WC  acetaminophen (TYLENOL) suppository 650 mg, 650 mg, Rectal, Q4H PRN  acetaminophen (TYLENOL) tablet 500 mg, 500 mg, Oral, Q6H PRN  enoxaparin (LOVENOX) injection 40 mg, 40 mg, SubCUTAneous, Daily  polyethylene glycol (GLYCOLAX) packet 17 g, 17 g, Oral, Daily PRN  dextrose 5 % solution, 100 mL/hr, IntraVENous, PRN  dextrose 5 % and 0.45 % sodium chloride infusion, , IntraVENous, Continuous PRN  glucose (GLUTOSE) 40 % oral gel 15 g, 15 g, Oral, PRN  glucagon (rDNA) injection 1 mg, 1 mg, IntraMUSCular, PRN  dextrose 5 % solution, 100 mL/hr, IntraVENous, PRN  dextrose bolus (hypoglycemia) 10% 125 mL, 125 mL, IntraVENous, PRN **OR** dextrose bolus (hypoglycemia) 10% 250 mL, 250 mL, IntraVENous, PRN    Allergies:  Patient has no known allergies. Social History:    No tobacco, no EtOH     Family History:   No kidney disease     Review of Systems:   Pertinent positives stated above in HPI. All other systems were reviewed and were negative.     Physical exam:   Constitutional:  VITALS:  BP (!) 169/67   Pulse 73   Temp 97 °F (36.1 °C) (Temporal)   Resp 16   Ht 5' 6\" (1.676 m)   Wt 147 lb (66.7 kg)   SpO2 97%   BMI 23.73 kg/m²     Gen: alert, awake, no acute distress  Eyes: anicteric sclerae, eomi  HEENT: atraumatic/normocephalic  Neck: no jvd   Lungs: clear to ascultation bilaterally, equal lung expansion  CV: RRR, no murmurs   Abdomen: soft, nontender, nondistended, normoactive bowel sounds  Extremitiy: no clubbing, cyanosis  No edema  : no CVA tenderness  Skin: no rash, tugor wnl  Neuro: no focal deficits, AOX3  Psych: cooperative and appropriate      Data:       Last 3 CMP:    Recent Labs     03/08/22  0546 03/08/22  1407 03/09/22  0819    138 140   K 4.9 4.4 4.4    103 105   CO2 26 23 23   BUN 33* 36* 34*   CREATININE 2.4* 2.5* 2.3*   GLUCOSE 255* 146* 144*   CALCIUM 9.0 9.1 9.4         Last 3 Glucose:     Recent Labs     03/08/22  0546 03/08/22  1407 03/09/22  0819   GLUCOSE 255* 146* 144*         Last 3 POC Glucose:     No results for input(s): POCGLU in the last 72 hours. Last 3 CK, CKMB, Troponin  No results for input(s): CKTOTAL, CKMB, TROPONINI in the last 72 hours. Last 3 CBC:  Recent Labs     03/07/22  0536 03/08/22  0546 03/09/22  0819   WBC 7.5 12.5* 10.3   RBC 4.28 4.21 4.36   HGB 12.7 12.8 13.0   HCT 38.8 39.4 40.2   MCV 90.7 93.6 92.2   MCH 29.7 30.4 29.8   MCHC 32.7 32.5 32.3   RDW 13.6 13.9 14.1    266 273   MPV 8.7 9.0 8.7       Last 3 Hepatic Function Panel:  No results for input(s): ALKPHOS, ALT, AST, PROT, BILITOT, BILIDIR, LABALBU in the last 72 hours. Albumin:  No results for input(s): LABALBU in the last 72 hours. Calcium:  Recent Labs     03/09/22 0819   CALCIUM 9.4       Ionized Calcium:  No results for input(s): IONCA in the last 72 hours. Magnesium:    Recent Labs     03/09/22 0819   MG 1.9         ABGs:  No results for input(s): PHART, PO2ART, HTN4XSS, WSJ4UKB, BEART, M2DLPLRJ in the last 72 hours. Lactic Acid:  No results for input(s): LACTA in the last 72 hours. Last 3 Amylase:  No results for input(s): AMYLASE in the last 72 hours. Last 3 Lipase:  No results for input(s): LIPASE in the last 72 hours. Last 3 BNP:  No results for input(s): BNP in the last 72 hours.         RETROPERITONEAL LIMITED [[id number]] Collected: 03/08/22 2048     Order Status: Completed Updated: 03/08/22 2053     Narrative:       EXAMINATION:   ULTRASOUND OF THE KIDNEYS     3/8/2022 2:56 pm     COMPARISON:   None. HISTORY:   ORDERING SYSTEM PROVIDED HISTORY: ivette   TECHNOLOGIST PROVIDED HISTORY:   Reason for exam:->ivette   What reading provider will be dictating this exam?->CRC     FINDINGS:   The right kidney measures 13 in length and the left kidney measures 12 in   length.  Right-sided extrarenal pelvis noted. A 3.1 cm maximal diameter cyst is noted in the left kidney. Kidneys demonstrate normal cortical echogenicity.  No hydronephrosis or   intrarenal stones. Mildly distended bladder.  Only the right-sided ureteral jet was noted, per   technologist notes.      Impression:       No evidence of hydronephrosis. Mildly distended bladder. Assessment/Plan      1 Acute kidney injury  Creatinine 0.6 on 2/28 admission  Creatinine 0.6 to 0.8 but creatinine 2.4 on 3/8 and then 2/5 on 3/8 PM and 2/3 on 3/9    Unclear etiology of worsening renal function  Possible element of urinary retention at it appears his brothers catheter was removed on March 7  Renal ultrasound with no hydronephrosis but distended bladder  Urinalysis on admission with 30 protein and no RBCs    At this time, will follow renal function and urine output  Continue IV fluids  Please check post void residual    2.  Hypertension  Blood pressure above long-term goal today but had a better controlled  Blood pressure and adjust regimen as needed          Thank you for the opportunity to participate in the care of Mr Myriam Kelly     ______________________________      Rianna Mckoy MD  3/9/2022  2:08 PM

## 2022-03-09 NOTE — PROGRESS NOTES
Department of Internal Medicine        CHIEF COMPLAINT: Altered mental status, seizures    Reason for Admission: Altered mental status, seizure, hypoglycemia    HISTORY OF PRESENT ILLNESS:      The patient is a 78 y.o. male who presents with being brought in by paramedics. Apparently patient's neighbors called EMS when he heard the patient screaming and when they found him he was unresponsive. Paramedics came to check blood sugar which was reading high. There is no evidence of any trauma at the scene. Patient was incontinent of urine. The patient had tonic-clonic seizure in the ED and was treated with Ativan and Keppra. CT the head did not show any acute intracranial abnormality. Patient had a random blood sugar of 573 on admission. Liver enzymes essentially normal with a negative drug screen and a WBC 7.1 hemoglobin 13.6.    3/1/2022  Patient seen examined on ICU. The patient is much more alert and oriented currently than he was yesterday. Patient is oriented to person. Patient still weak with very poor memory. He denies any chest or abdominal pain. He denies any headaches or blurred vision. BUN/creatinine 12/0.6. Blood sugars ranging 200s. Hemoglobin A1c was 12.3. WBC 13.8 with hemoglobin 13.3. Sed rate is 21. Temperature 98.1 with heart rate 83 and blood pressure 150/69. O2 sat 98% on room air at rest.  Urine output is very good. Case discussed with Dr. Ana Dent today. With this patient's altered mental status even though it has improved we are attempting to have IR to do a lumbar puncture. I think with the patient's presentation it would be a very good idea to have this done. 3/2/2022  Patient seen examined on ICU. Patient is a little bit more alert and oriented again today. Patient still very very weak. Patient still has somewhat of a flat affect. Patient does deny any type of chest, abdominal pain. Denies any significant unilateral weakness.   MRI of the brain showed just old infarcts. The lumbar puncture though did show an increase in protein. BUN/creatinine 15/0.5. Blood sugars ranging 141190. WBC 13.5 hemoglobin 13. Temperature is 97.8 with heart rate is 68 blood pressure 160/55. O2 sat 97% on 2 L nasal cannula. Urine output is good. 3/3/2022  Patient seen examined in ICU. Patient is much more alert today. Patient states he feels great and he denies any problem chest pain, abdominal pain, nausea vomiting. Patient denies problem with headaches or blurred vision. Patient also denies any history of daily alcohol abuse or any significant binge drinking. BUN/creatinine 19/0.6, blood sugars ranging [id number]. WBC is 12 with hemoglobin 12.8. Temperature 97.7 with heart rate 89 blood pressure 142/60. O2 sat 97% room air at rest.  Urine output ranges 550-1400 cc a shift. EEG reviewed with no evidence of active seizure. 3/4/2022  Patient seen examined in ICU. Patient again is little bit more alert and oriented today. Patient is oriented to person place. Patient has one-on-one sitter and she states the patient is not confused this morning and did eat most of his breakfast.  BUN/creatinine 18/0.6 with patient's blood sugars ranging 195490. Hemoglobin 11.7 WBC 7.9. Temperature 97.6 heart rate 83 and blood pressure 160/65. O2 sat 96% room air at rest.  Urine output was good ranging 550-1900 cc a shift. Neurology, intensivist, ID notes reviewed. 3/5-3/6 -coverage by Dr Chey Roberto    3/7/2022  Patient seen examined on telemetry floor. Patient seems to be doing little bit better. He denies any problem with any chest pain, abdominal pain, nausea vomiting or unusual shortness of breath. Still fairly weak but this is improving. BUN/creatinine was 13/0.7 with blood sugars ranging 143216. WBC 7.5 hemoglobin 12.7. Temperature is 98.4 with heart rate of 80 blood pressure 116/40. O2 sat 98% room air at rest.  Urine output is excellent.     3/8/2022  Patient seen examined on telemetry floor. Patient sons at the bedside. BUN/creatinine increased to 33/2.4 this morning. Patient denies any other problems such as chest pain, abdominal pain, dizziness, diarrhea, fever/chills. Patient states that he has been urinating adequately. Blood sugars range 129255. WBC also increased 12.5 with hemoglobin 12.8. Temperature low was normal at 98.2 with heart rate of 70 blood pressure was 160/71 today. Patient blood pressure apparently did not drop yesterday or early this morning. Patient urine output is still fairly adequate and had 400 cc out early this morning. Patient be given 1 L normal saline over 2 hours and repeat BMP at 1 PM.    3/9/2022  Patient seen examined on telemetry floor. Patient states she is feeling better. He denies any chest or abdominal pain. He denies any unusual shortness of breath. Patient denies any problem with urination. Ultrasound kidneys last night did not show any evidence of hydronephrosis and it showed mildly distended bladder. BUN/creatinine 34/2.3 today. Blood sugars ranging 120210. WBC is 10.3 with hemoglobin 13. Temperature 97.3 with heart rate of 73 and blood pressure 169/67. Past Medical History:    Past Medical History:   Diagnosis Date    Type 2 diabetes mellitus with hyperglycemia, without long-term current use of insulin ([facility] [address]      Past Surgical History:    History reviewed. No pertinent surgical history. Medications Prior to Admission:    @  Prior to Admission medications    Medication Sig Start Date End Date Taking?  Authorizing Provider   levETIRAcetam (KEPPRA) 500 MG tablet Take 1 tablet by mouth 2 times daily 3/7/22  Yes Scottie Randle DO   insulin glargine (LANTUS) 100 UNIT/ML injection vial Inject 6 Units into the skin daily 3/8/22  Yes Scottie Randle DO   atorvastatin (LIPITOR) 40 MG tablet Take 1 tablet by mouth nightly 3/7/22  Yes Scottie Randle, DO   aspirin 81 MG EC tablet Take 1 tablet by mouth daily 7/27/21 Orvel Big, DO   alogliptin (NESINA) 12.5 MG TABS tablet Take 1 tablet by mouth daily 7/27/21   Orvel Big, DO   insulin lispro (HUMALOG) 100 UNIT/ML injection vial Inject 0-3 Units into the skin nightly 7/26/21   Orvel Big, DO   insulin lispro (HUMALOG) 100 UNIT/ML injection vial Inject 0-6 Units into the skin 3 times daily (with meals) 7/26/21   Orvel Big, DO   metFORMIN (GLUCOPHAGE) 1000 MG tablet Take 1 tablet by mouth 2 times daily (with meals) 7/26/21   Orvel Big, DO   lisinopril (PRINIVIL;ZESTRIL) 10 MG tablet Take 1 tablet by mouth daily 7/27/21   Orvel Big, DO   thiamine mononitrate (THIAMINE) 100 MG tablet Take 1 tablet by mouth daily 7/27/21   Orvel Big, DO       Allergies:  Patient has no known allergies. Social History:   Social History     Socioeconomic History    Marital status:      Spouse name: Not on file    Number of children: Not on file    Years of education: Not on file    Highest education level: Not on file   Occupational History    Not on file   Tobacco Use    Smoking status: Never Smoker    Smokeless tobacco: Never Used   Substance and Sexual Activity    Alcohol use: Not on file    Drug use: Not on file    Sexual activity: Not on file   Other Topics Concern    Not on file   Social History Narrative    Not on file     Social Determinants of Health     Financial Resource Strain:     Difficulty of Paying Living Expenses: Not on file   Food Insecurity:     Worried About Running Out of Food in the Last Year: Not on file    Cora of Food in the Last Year: Not on file   Transportation Needs:     Lack of Transportation (Medical): Not on file    Lack of Transportation (Non-Medical):  Not on file   Physical Activity:     Days of Exercise per Week: Not on file    Minutes of Exercise per Session: Not on file   Stress:     Feeling of Stress : Not on file   Social Connections:     Frequency of Communication with Friends and Family: Not on file    Frequency of Social Gatherings with Friends and Family: Not on file    Attends Faith Services: Not on file    Active Member of Clubs or Organizations: Not on file    Attends Club or Organization Meetings: Not on file    Marital Status: Not on file   Intimate Partner Violence:     Fear of Current or Ex-Partner: Not on file    Emotionally Abused: Not on file    Physically Abused: Not on file    Sexually Abused: Not on file   Housing Stability:     Unable to Pay for Housing in the Last Year: Not on file    Number of [place] in the Last Year: Not on file    Unstable Housing in the Last Year: Not on file       Family History:   No family history on file. REVIEW OF SYSTEMS: Unable to get information secondary to patient's current condition. Gen: Patient denies any lightheadedness or dizziness. No LOC or syncope. No fevers or chills. HEENT: No earache, sore throat or nasal congestion. Resp: Denies cough, hemoptysis or sputum production. Cardiac: Denies chest pain, SOB, diaphoresis or palpitations. GI: No nausea, vomiting, diarrhea or constipation. No melena or hematochezia. : No urinary complaints, dysuria, hematuria or frequency. MSK: No extremity weakness, paralysis or paresthesias. PHYSICAL EXAM:    Vitals:  BP (!) 169/67   Pulse 73   Temp 97 °F (36.1 °C) (Temporal)   Resp 16   Ht 5' 6\" (1.676 m)   Wt 147 lb (66.7 kg)   SpO2 97%   BMI 23.73 kg/m²     General:  This is a 78 y.o. yo male who is unresponsive to verbal or painful stimuli at this time. HEENT:  Head is normocephalic and atraumatic, PERRLA, EOMI, mucus membranes dry with no pharyngeal erythema or exudate. Neck:  Supple with no carotid bruits, JVD or thyromegaly.   No cervical adenopathy  CV:  Regular rate and rhythm, 2/6 systolic murmurs  Lungs:  Clear to auscultation bilaterally with no wheezes, rales or rhonchi  Abdomen:  Soft, nontender, nondistended, bowel sounds present  Extremities:  No edema, peripheral pulses intact bilaterally  Neuro: Patient unresponsive to verbal or painful stimuli  Skin:  No rashes, lesions or wounds    DATA:  CBC with Differential:    Lab Results   Component Value Date    WBC 10.3 03/09/2022    RBC 4.36 03/09/2022    HGB 13.0 03/09/2022    HCT 40.2 03/09/2022     03/09/2022    MCV 92.2 03/09/2022    MCH 29.8 03/09/2022    MCHC 32.3 03/09/2022    RDW 14.1 03/09/2022    SEGSPCT 66 04/28/2011    LYMPHOPCT 2.0 03/02/2022    MONOPCT 3.0 03/02/2022    BASOPCT 0.0 03/02/2022    MONOSABS 0.40 03/02/2022    LYMPHSABS 0.27 03/02/2022    EOSABS 0.00 03/02/2022    BASOSABS 0.00 03/02/2022     CMP:    Lab Results   Component Value Date     03/09/2022    K 4.4 03/09/2022    K 3.9 07/22/2021     03/09/2022    CO2 23 03/09/2022    BUN 34 03/09/2022    CREATININE 2.3 03/09/2022    GFRAA 34 03/09/2022    LABGLOM 28 03/09/2022    GLUCOSE 144 03/09/2022    GLUCOSE 142 04/29/2011    PROT 6.5 03/01/2022    LABALBU 3.8 03/01/2022    CALCIUM 9.4 03/09/2022    BILITOT 0.3 03/01/2022    ALKPHOS 113 03/01/2022    AST 39 03/01/2022    ALT 16 03/01/2022     Magnesium:    Lab Results   Component Value Date    MG 1.9 03/09/2022     Phosphorus:    Lab Results   Component Value Date    PHOS 4.2 03/09/2022     PT/INR:    Lab Results   Component Value Date    PROTIME 11.5 03/01/2022    PROTIME 13.2 04/29/2011    INR 1.0 03/01/2022     Troponin:  No results found for: TROPONINI  U/A:    Lab Results   Component Value Date    COLORU Straw 02/28/2022    PROTEINU 30 02/28/2022    PHUR 7.0 02/28/2022    WBCUA 1-3 02/28/2022    RBCUA 0-1 02/28/2022    BACTERIA RARE 02/28/2022    CLARITYU Clear 02/28/2022    SPECGRAV 1.015 02/28/2022    LEUKOCYTESUR Negative 02/28/2022    UROBILINOGEN 0.2 02/28/2022    BILIRUBINUR Negative 02/28/2022    BLOODU TRACE 02/28/2022    GLUCOSEU >=1000 02/28/2022     ABG:    Lab Results   Component Value Date    PH 7.387 02/28/2022    PCO2 40.5 02/28/2022    PO2 116.9 02/28/2022    HCO3 23.8 02/28/2022    BE -1.1 02/28/2022    O2SAT 97.7 02/28/2022     HgBA1c:    Lab Results   Component Value Date    LABA1C 12.3 02/28/2022     FLP:    Lab Results   Component Value Date    TRIG 104 03/01/2022    HDL 59 03/01/2022    LDLCALC 125 03/01/2022    LABVLDL 21 03/01/2022     TSH:    Lab Results   Component Value Date    TSH 2.130 02/28/2022     IRON:  No results found for: IRON  LIPASE:  No results found for: LIPASE    ASSESSMENT AND PLAN:      Patient Active Problem List    Diagnosis Date Noted    Hyperosmolar hyperglycemic state (HHS) ([facility] [address] 02/28/2022    New onset seizure ([facility] [address] 02/28/2022    Altered mental status 02/28/2022    Delirium 07/22/2021     Impression:  1. Altered mental status -probable metabolic encephalopathy  2. Noninsulin dependent diabetes mellitus type 2hyperglycemic-hemoglobin A1c 12.3 with resolving hyperosmolar state  3. New onset tonic-clonic seizure  4. 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 History of medical noncompliance  6. History of old lacunar infarcts  7. Hypertension  8. History of spinal stenosis    Plan:  Home medications reviewed  IV fluids normal saline 20 KCl 100 cc an hour  Glucoscans 4 times daily with sliding scale insulin  Ativan 1 mg as needed for seizure  Consult neurology  Keppra 500 mg IV piggyback every 12 hours  Possible MRI of the brain if no improvement in mental status  Lovenox 40 mg subcu daily  Consult PT/OT  Infectious disease consult    Consult / for discharge planning    Discharge to extended-care facility when okay with infectious disease    IV fluids normal saline 100 cc an hour      CMP in a.m.       Antonino Rawls DO, D.O.  3/9/2022  11:35 AM

## 2022-03-09 NOTE — CARE COORDINATION
3/9/2022 1216 CM note: Negative covid 3/7/22. Per [facility], pt accepted and bed is available. For SNF, NO PRECERT, SW initiated HENS-will need completed once dc order written, PAYAM is signed. Pt's son Mariano Hawk  will transfer patient to SNF. Pt's PANTERA Harris and SNF liason updated. Await nephrology plan of care.  Delmy RAMIRES

## 2022-03-09 NOTE — PROGRESS NOTES
Patients son Fatemeh Jauregui called this evening asking in he is able to get a work excuse for tomorrow 3/9 d/t father not being discharged today as planned when he was off.   Son instructed to stop at charge desk tomorrow with his request.

## 2022-03-09 NOTE — PROGRESS NOTES
Physical Therapy  Physical Therapy Treatment Note/Plan of Care    Room #:  2023/4129-95  Patient Name: Carmen Walsh  YOB: 1945  MRN: [id number]    Date of Service: 3/9/2022     Tentative placement recommendation: Subacute rehab  Equipment recommendation: To be determined      Evaluating Physical Therapist: Gurpreet Dos Santos Physical Therapist      Specific Provider Orders/Date/Referring Provider : 02/28/22 1645   PT eval and treat Start: 02/28/22 1645, End: 02/28/22 1645, ONE TIME, Standing Count: 1 Occurrences, R    Scottie Randle, DO      Admitting Diagnosis:   Infestation by bed bug [B88.8]  Seizure ([address] [R56.9]  Altered mental status [R41.82]  Altered mental status, unspecified altered mental status type [R41.82]  Hyperosmolar hyperglycemic state (HHS) ([address] [E11.00, E11.65]      Surgery: Lumbar puncture 3/1  Visit Diagnoses       Codes    Seizure ([address]     R56.9    Infestation by bed bug     B88.8          Patient Active Problem List   Diagnosis    Delirium    Hyperosmolar hyperglycemic state (HHS) ([address]    New onset seizure ([address]    Altered mental status        ASSESSMENT of Current Deficits Patient exhibits decreased strength, balance and endurance impairing functional mobility, transfers, gait  and gait distance. Patient is hard of hearing. Patient requires minimal assist to perform all functional mobility. Patient needing consistent cueing walker approximation, increased step length, and to increase step length during ambulation as patient tends to demonstrate short shuffled steps with narrow base of support. The patient will benefit from continued skilled therapy to increase strength and improve balance for safe functional mobility, to decrease risk of falls, and to meet goals at discharge.       PHYSICAL THERAPY  PLAN OF CARE       Physical therapy plan of care is established based on physician order,  patient diagnosis and clinical assessment    Current Treatment Recommendations:    -Bed Mobility: Lower extremity exercises  and Upper extremity exercises   -Sitting Balance: Incorporate reaching activities to activate trunk muscles  and Hands on support to maintain midline   -Standing Balance: Perform strengthening exercises in standing to promote motor control with or without upper extremity support  and Perform sit to stand activities maintaining FWB (full weight bearing) weightbearing Bilateral   -Transfers: Provide instruction on proper hand and foot position for adequate transfer of weight onto lower extremities and use of gait device if needed and Cues for hand placement, technique and safety. Provide stabilization to prevent fall   -Gait: Gait training, Standing activities to improve: base of support, weight shift, weight bearing  and Use of Assistive device for FWB (full weight bearing) weight bearing Bilateral     -Endurance: Utilize Supervised activities to increase level of endurance to allow for safe functional mobility including transfers and gait  and Use graduated activities to promote good breathing techniques and provide support and education to maximize respiratory function    PT long term treatment goals are located in below grid    Patient and or family understand(s) diagnosis, prognosis, and plan of care. Frequency of treatments: Patient will be seen  daily. Prior Level of Function: Patient jennifer historian unable to provide information about ambulation. Rehab Potential: good  for baseline    Past medical history:   Past Medical History:   Diagnosis Date    Type 2 diabetes mellitus with hyperglycemia, without long-term current use of insulin ([facility] [address]      History reviewed. No pertinent surgical history. SUBJECTIVE:    Precautions: Continuous Pulse Oximetry , falls and seizures , brothers,  Social history: Patient lived at home alone in [address] [facility] apartments according to chart. Patient jennifer historian.      Equipment owned: Patient poor historian    [id number] [facility]  Mobility Inpatient   How much difficulty turning over in bed?: A Little  How much difficulty sitting down on / standing up from a chair with arms?: A Little  How much difficulty moving from lying on back to sitting on side of bed?: A Little  How much help from another person moving to and from a bed to a chair?: A Little  How much help from another person needed to walk in hospital room?: A Little  How much help from another person for climbing 3-5 steps with a railing?: A Lot  AM-PAC Inpatient Mobility Raw Score : 17  AM-PAC Inpatient T-Scale Score : 42.13  Mobility Inpatient CMS 0-100% Score: 50.57  Mobility Inpatient CMS G-Code Modifier : CK    Nursing cleared patient for PT treatment. .   OBJECTIVE;   Initial Evaluation  Date: 3/2/2022 Treatment Date:  3/9/2022     Short Term/ Long Term   Goals   Was pt agreeable to Eval/treatment? Yes Yes To be met in 5 days   Pain level   0/10   0/10    Bed Mobility    Rolling: Maximal assist of 1    Supine to sit: Maximal assist of 1    Sit to supine: Maximal assist of 1    Scooting: Maximal assist of 1   Rolling: Supervision    Supine to sit: Supervision    Sit to supine: Supervision    Scooting: Supervision     Rolling: Minimal assist of 1    Supine to sit: Minimal assist of 1    Sit to supine: Minimal assist of 1    Scooting: Minimal assist of 1     Transfers Sit to stand: Moderate assist of  2  Sit to stand: Minimal assist of 1      Sit to stand: Moderate assist of 1    Ambulation    not assessed  100 feet using wheeled walker with minimal assist. V/C for upright posture, walker approximation, increased step length/AYLEEN, pacing, and safety.   25 feet using  wheeled walker with Moderate assist of 1    Stair negotiation: ascended and descended   Not assessed     Not assessed    ROM Within functional limits    Increase range of motion 10% of affected joints    Strength BUE:  refer to OT eval  RLE:  3+/5  LLE:  3+/5  Increase strength in affected mm groups by 1/3 grade   Balance Sitting EOB:  poor + posterior lean  Dynamic Standing:  poor  Sitting EOB: good-   Dynamic Standing: fair wheeled walker   Sitting EOB:  fair   Dynamic Standing: fair      Patient is Alert & Oriented x person, place, time and situation and follows directions    Sensation:  Patient  denies numbness/tingling   Edema:  no   Endurance: fair     Vitals: room air   Blood Pressure at rest  Blood Pressure during session    Heart Rate at rest  Heart Rate during session    SPO2 at rest %  SPO2 during session 98%     Patient education  Patient educated on role of Physical Therapy, risks of immobility, safety and plan of care, energy conservation,  importance of mobility while in hospital , purse lip breathing, ankle pumps, quad set and glut set for edema control, blood clot prevention and safety      Patient response to education:   Pt verbalized understanding Pt demonstrated skill Pt requires further education in this area   Yes Partial Yes      Treatment:  Patient practiced and was instructed/facilitated in the following treatment: Patient assisted to EOB. Pt assisted to edge of bed,  Sat edge of bed 5 minutes with Supervision  to increase dynamic sitting balance and activity tolerance. Patient performed bed mobility, transfers, ambulation in hallway. Patient performed x5 reps STS. Therapeutic Exercises:  ankle pumps, glut sets, long arc quad and seated marching 2 x 15 reps    At end of session, patient in chair with . call light and phone within reach,  all lines and tubes intact, nursing notified. Patient would benefit from continued skilled Physical Therapy to improve functional independence and quality of life.          Patient's/ family goals   none stated    Time in  0950  Time out  1013    Total Treatment Time  23 minutes    CPT codes:  Therapeutic exercises (42775)   13 minutes  1 unit(s)  Gait Training (62648) 10 minutes 1 unit(s)     Navarro Mallory PT #[id number]

## 2022-03-10 VITALS
SYSTOLIC BLOOD PRESSURE: 160 MMHG | WEIGHT: 146 LBS | HEART RATE: 70 BPM | HEIGHT: 66 IN | OXYGEN SATURATION: 100 % | RESPIRATION RATE: 16 BRPM | DIASTOLIC BLOOD PRESSURE: 79 MMHG | BODY MASS INDEX: 23.46 KG/M2 | TEMPERATURE: 97.2 F

## 2022-03-10 LAB
ALBUMIN SERPL-MCNC: 3.1 G/DL (ref 3.5–5.2)
ALP BLD-CCNC: 72 U/L (ref 40–129)
ALT SERPL-CCNC: 14 U/L (ref 0–40)
ANION GAP SERPL CALCULATED.3IONS-SCNC: 12 MMOL/L (ref 7–16)
AST SERPL-CCNC: 26 U/L (ref 0–39)
BILIRUB SERPL-MCNC: 0.7 MG/DL (ref 0–1.2)
BUN BLDV-MCNC: 29 MG/DL (ref 6–23)
CALCIUM SERPL-MCNC: 8.8 MG/DL (ref 8.6–10.2)
CHLORIDE BLD-SCNC: 103 MMOL/L (ref 98–107)
CO2: 23 MMOL/L (ref 22–29)
CREAT SERPL-MCNC: 1.7 MG/DL (ref 0.7–1.2)
GFR AFRICAN AMERICAN: 48
GFR NON-AFRICAN AMERICAN: 40 ML/MIN/1.73
GLUCOSE BLD-MCNC: 117 MG/DL (ref 74–99)
HCT VFR BLD CALC: 40.7 % (ref 37–54)
HEMOGLOBIN: 13.1 G/DL (ref 12.5–16.5)
MAGNESIUM: 2.1 MG/DL (ref 1.6–2.6)
MCH RBC QN AUTO: 29.8 PG (ref 26–35)
MCHC RBC AUTO-ENTMCNC: 32.2 % (ref 32–34.5)
MCV RBC AUTO: 92.5 FL (ref 80–99.9)
METER GLUCOSE: 111 MG/DL (ref 74–99)
METER GLUCOSE: 220 MG/DL (ref 74–99)
PDW BLD-RTO: 14 FL (ref 11.5–15)
PHOSPHORUS: 3.6 MG/DL (ref 2.5–4.5)
PLATELET # BLD: 267 E9/L (ref 130–450)
PMV BLD AUTO: 8.9 FL (ref 7–12)
POTASSIUM SERPL-SCNC: 4.5 MMOL/L (ref 3.5–5)
RBC # BLD: 4.4 E12/L (ref 3.8–5.8)
SODIUM BLD-SCNC: 138 MMOL/L (ref 132–146)
TOTAL PROTEIN: 6.1 G/DL (ref 6.4–8.3)
WBC # BLD: 7.4 E9/L (ref 4.5–11.5)

## 2022-03-10 PROCEDURE — 6360000002 HC RX W HCPCS: Performed by: INTERNAL MEDICINE

## 2022-03-10 PROCEDURE — 36415 COLL VENOUS BLD VENIPUNCTURE: CPT

## 2022-03-10 PROCEDURE — 83735 ASSAY OF MAGNESIUM: CPT

## 2022-03-10 PROCEDURE — 6370000000 HC RX 637 (ALT 250 FOR IP): Performed by: INTERNAL MEDICINE

## 2022-03-10 PROCEDURE — 84100 ASSAY OF PHOSPHORUS: CPT

## 2022-03-10 PROCEDURE — 80053 COMPREHEN METABOLIC PANEL: CPT

## 2022-03-10 PROCEDURE — 2580000003 HC RX 258: Performed by: INTERNAL MEDICINE

## 2022-03-10 PROCEDURE — 82962 GLUCOSE BLOOD TEST: CPT

## 2022-03-10 PROCEDURE — 85027 COMPLETE CBC AUTOMATED: CPT

## 2022-03-10 RX ORDER — CALCIUM CARBONATE 200(500)MG
500 TABLET,CHEWABLE ORAL 3 TIMES DAILY PRN
COMMUNITY
Start: 2022-03-10 | End: 2022-04-09

## 2022-03-10 RX ADMIN — LEVETIRACETAM 500 MG: 500 TABLET, FILM COATED ORAL at 10:02

## 2022-03-10 RX ADMIN — Medication 10 ML: at 10:05

## 2022-03-10 RX ADMIN — ASPIRIN 81 MG: 81 TABLET, COATED ORAL at 10:02

## 2022-03-10 RX ADMIN — SODIUM CHLORIDE, PRESERVATIVE FREE 10 ML: 5 INJECTION INTRAVENOUS at 10:04

## 2022-03-10 RX ADMIN — INSULIN GLARGINE 6 UNITS: 100 INJECTION, SOLUTION SUBCUTANEOUS at 10:37

## 2022-03-10 RX ADMIN — INSULIN LISPRO 4 UNITS: 100 INJECTION, SOLUTION INTRAVENOUS; SUBCUTANEOUS at 11:39

## 2022-03-10 RX ADMIN — ENOXAPARIN SODIUM 30 MG: 100 INJECTION SUBCUTANEOUS at 10:02

## 2022-03-10 NOTE — DISCHARGE SUMMARY
Department of Internal Medicine        CHIEF COMPLAINT: Altered mental status, seizures    Reason for Admission: Altered mental status, seizure, hypoglycemia    HISTORY OF PRESENT ILLNESS:      The patient is a 78 y.o. male who presents with being brought in by paramedics. Apparently patient's neighbors called EMS when he heard the patient screaming and when they found him he was unresponsive. Paramedics came to check blood sugar which was reading high. There is no evidence of any trauma at the scene. Patient was incontinent of urine. The patient had tonic-clonic seizure in the ED and was treated with Ativan and Keppra. CT the head did not show any acute intracranial abnormality. Patient had a random blood sugar of 573 on admission. Liver enzymes essentially normal with a negative drug screen and a WBC 7.1 hemoglobin 13.6.    3/1/2022  Patient seen examined on ICU. The patient is much more alert and oriented currently than he was yesterday. Patient is oriented to person. Patient still weak with very poor memory. He denies any chest or abdominal pain. He denies any headaches or blurred vision. BUN/creatinine 12/0.6. Blood sugars ranging 200s. Hemoglobin A1c was 12.3. WBC 13.8 with hemoglobin 13.3. Sed rate is 21. Temperature 98.1 with heart rate 83 and blood pressure 150/69. O2 sat 98% on room air at rest.  Urine output is very good. Case discussed with Dr. Nidhi [place] today. With this patient's altered mental status even though it has improved we are attempting to have IR to do a lumbar puncture. I think with the patient's presentation it would be a very good idea to have this done. 3/2/2022  Patient seen examined on ICU. Patient is a little bit more alert and oriented again today. Patient still very very weak. Patient still has somewhat of a flat affect. Patient does deny any type of chest, abdominal pain. Denies any significant unilateral weakness.   MRI of the brain showed just old telemetry floor. Patient sons at the bedside. BUN/creatinine increased to 33/2.4 this morning. Patient denies any other problems such as chest pain, abdominal pain, dizziness, diarrhea, fever/chills. Patient states that he has been urinating adequately. Blood sugars range 129255. WBC also increased 12.5 with hemoglobin 12.8. Temperature low was normal at 98.2 with heart rate of 70 blood pressure was 160/71 today. Patient blood pressure apparently did not drop yesterday or early this morning. Patient urine output is still fairly adequate and had 400 cc out early this morning. Patient be given 1 L normal saline over 2 hours and repeat BMP at 1 PM.    3/9/2022  Patient seen examined on telemetry floor. Patient states she is feeling better. He denies any chest or abdominal pain. He denies any unusual shortness of breath. Patient denies any problem with urination. Ultrasound kidneys last night did not show any evidence of hydronephrosis and it showed mildly distended bladder. BUN/creatinine 34/2.3 today. Blood sugars ranging 120210. WBC is 10.3 with hemoglobin 13. Temperature 97.3 with heart rate of 73 and blood pressure 169/67.    3/10/2022  Patient seen examined on telemetry floor. Patient states he feels good. Patient denies any chest or abdominal pain. Patient states he is urinating very well. BUN/creatinine 29/1.7 with potassium 4.5. Blood sugars ranging 1111 58. WBC is 7.4 and hemoglobin 13.1. Temperature 97.2 with heart rate is 70 blood pressure 160/79. O2 sat 100% on room air at rest.  Urine output range 400-500 cc a shift. Case was discussed with nephrology today. He said that from his standpoint the patient could be discharged to the extended care facility. Patient though will need recheck BMP early next week.     Patient stable for discharge    Past Medical History:    Past Medical History:   Diagnosis Date    Type 2 diabetes mellitus with hyperglycemia, without long-term current use of insulin ([facility] [address]      Past Surgical History:    History reviewed. No pertinent surgical history. Medications Prior to Admission:    @  Prior to Admission medications    Medication Sig Start Date End Date Taking? Authorizing Provider   levETIRAcetam (KEPPRA) 500 MG tablet Take 1 tablet by mouth 2 times daily 3/7/22  Yes Tyrone Tran,    insulin glargine (LANTUS) 100 UNIT/ML injection vial Inject 6 Units into the skin daily 3/8/22  Yes Tyrone Tran,    atorvastatin (LIPITOR) 40 MG tablet Take 1 tablet by mouth nightly 3/7/22  Yes Tyrone Tran,    aspirin 81 MG EC tablet Take 1 tablet by mouth daily 7/27/21   Tyrone Tran, DO   alogliptin (NESINA) 12.5 MG TABS tablet Take 1 tablet by mouth daily 7/27/21   Tyrone Tran,    insulin lispro (HUMALOG) 100 UNIT/ML injection vial Inject 0-3 Units into the skin nightly 7/26/21   Tyrone Tran, DO   insulin lispro (HUMALOG) 100 UNIT/ML injection vial Inject 0-6 Units into the skin 3 times daily (with meals) 7/26/21   Tyrone Tran, DO   metFORMIN (GLUCOPHAGE) 1000 MG tablet Take 1 tablet by mouth 2 times daily (with meals) 7/26/21   Tyrone Tran, DO   lisinopril (PRINIVIL;ZESTRIL) 10 MG tablet Take 1 tablet by mouth daily 7/27/21   Tyrone Tran, DO   thiamine mononitrate (THIAMINE) 100 MG tablet Take 1 tablet by mouth daily 7/27/21   Tyrone Tran, DO       Allergies:  Patient has no known allergies.     Social History:   Social History     Socioeconomic History    Marital status:      Spouse name: Not on file    Number of children: Not on file    Years of education: Not on file    Highest education level: Not on file   Occupational History    Not on file   Tobacco Use    Smoking status: Never Smoker    Smokeless tobacco: Never Used   Substance and Sexual Activity    Alcohol use: Not on file    Drug use: Not on file    Sexual activity: Not on file   Other Topics Concern    Not on file   Social History Narrative    Not on file     Social Determinants of Health     Financial Resource Strain:     Difficulty of Paying Living Expenses: Not on file   Food Insecurity:     Worried About Running Out of Food in the Last Year: Not on file    Cora of Food in the Last Year: Not on file   Transportation Needs:     Lack of Transportation (Medical): Not on file    Lack of Transportation (Non-Medical): Not on file   Physical Activity:     Days of Exercise per Week: Not on file    Minutes of Exercise per Session: Not on file   Stress:     Feeling of Stress : Not on file   Social Connections:     Frequency of Communication with Friends and Family: Not on file    Frequency of Social Gatherings with Friends and Family: Not on file    Attends Jewish Services: Not on file    Active Member of [address] [place] [address] or Organizations: Not on file    Attends Club or Organization Meetings: Not on file    Marital Status: Not on file   Intimate Partner Violence:     Fear of Current or Ex-Partner: Not on file    Emotionally Abused: Not on file    Physically Abused: Not on file    Sexually Abused: Not on file   Housing Stability:     Unable to Pay for Housing in the Last Year: Not on file    Number of [place] in the Last Year: Not on file    Unstable Housing in the Last Year: Not on file       Family History:   No family history on file. REVIEW OF SYSTEMS: Unable to get information secondary to patient's current condition. Gen: Patient denies any lightheadedness or dizziness. No LOC or syncope. No fevers or chills. HEENT: No earache, sore throat or nasal congestion. Resp: Denies cough, hemoptysis or sputum production. Cardiac: Denies chest pain, SOB, diaphoresis or palpitations. GI: No nausea, vomiting, diarrhea or constipation. No melena or hematochezia. : No urinary complaints, dysuria, hematuria or frequency. MSK: No extremity weakness, paralysis or paresthesias.      PHYSICAL EXAM:    Vitals:  BP (!) 160/79   Pulse 70   Temp 97.2 °F (36.2 °C) (Temporal)   Resp 16   Ht 5' 6\" (1.676 m)   Wt 146 lb (66.2 kg)   SpO2 100%   BMI 23.57 kg/m²     General:  This is a 78 y.o. yo male who is unresponsive to verbal or painful stimuli at this time. HEENT:  Head is normocephalic and atraumatic, PERRLA, EOMI, mucus membranes dry with no pharyngeal erythema or exudate. Neck:  Supple with no carotid bruits, JVD or thyromegaly.   No cervical adenopathy  CV:  Regular rate and rhythm, 2/6 systolic murmurs  Lungs:  Clear to auscultation bilaterally with no wheezes, rales or rhonchi  Abdomen:  Soft, nontender, nondistended, bowel sounds present  Extremities:  No edema, peripheral pulses intact bilaterally  Neuro: Patient unresponsive to verbal or painful stimuli  Skin:  No rashes, lesions or wounds    DATA:  CBC with Differential:    Lab Results   Component Value Date    WBC 7.4 03/10/2022    RBC 4.40 03/10/2022    HGB 13.1 03/10/2022    HCT 40.7 03/10/2022     03/10/2022    MCV 92.5 03/10/2022    MCH 29.8 03/10/2022    MCHC 32.2 03/10/2022    RDW 14.0 03/10/2022    SEGSPCT 66 04/28/2011    LYMPHOPCT 2.0 03/02/2022    MONOPCT 3.0 03/02/2022    BASOPCT 0.0 03/02/2022    MONOSABS 0.40 03/02/2022    LYMPHSABS 0.27 03/02/2022    EOSABS 0.00 03/02/2022    BASOSABS 0.00 03/02/2022     CMP:    Lab Results   Component Value Date     03/10/2022    K 4.5 03/10/2022    K 3.9 07/22/2021     03/10/2022    CO2 23 03/10/2022    BUN 29 03/10/2022    CREATININE 1.7 03/10/2022    GFRAA 48 03/10/2022    LABGLOM 40 03/10/2022    GLUCOSE 117 03/10/2022    GLUCOSE 142 04/29/2011    PROT 6.1 03/10/2022    LABALBU 3.1 03/10/2022    CALCIUM 8.8 03/10/2022    BILITOT 0.7 03/10/2022    ALKPHOS 72 03/10/2022    AST 26 03/10/2022    ALT 14 03/10/2022     Magnesium:    Lab Results   Component Value Date    MG 2.1 03/10/2022     Phosphorus:    Lab Results   Component Value Date    PHOS 3.6 03/10/2022     PT/INR:    Lab Results Component Value Date    PROTIME 11.5 03/01/2022    PROTIME 13.2 04/29/2011    INR 1.0 03/01/2022     Troponin:  No results found for: TROPONINI  U/A:    Lab Results   Component Value Date    COLORU Straw 02/28/2022    PROTEINU 30 02/28/2022    PHUR 7.0 02/28/2022    WBCUA 1-3 02/28/2022    RBCUA 0-1 02/28/2022    BACTERIA RARE 02/28/2022    CLARITYU Clear 02/28/2022    SPECGRAV 1.015 02/28/2022    LEUKOCYTESUR Negative 02/28/2022    UROBILINOGEN 0.2 02/28/2022    BILIRUBINUR Negative 02/28/2022    BLOODU TRACE 02/28/2022    GLUCOSEU >=1000 02/28/2022     ABG:    Lab Results   Component Value Date    PH 7.387 02/28/2022    PCO2 40.5 02/28/2022    PO2 116.9 02/28/2022    HCO3 23.8 02/28/2022    BE -1.1 02/28/2022    O2SAT 97.7 02/28/2022     HgBA1c:    Lab Results   Component Value Date    LABA1C 12.3 02/28/2022     FLP:    Lab Results   Component Value Date    TRIG 104 03/01/2022    HDL 59 03/01/2022    LDLCALC 125 03/01/2022    LABVLDL 21 03/01/2022     TSH:    Lab Results   Component Value Date    TSH 2.130 02/28/2022     IRON:  No results found for: IRON  LIPASE:  No results found for: LIPASE    ASSESSMENT AND PLAN:      Patient Active Problem List    Diagnosis Date Noted    Hyperosmolar hyperglycemic state (HHS) ([facility] [address] 02/28/2022    New onset seizure ([facility] [address] 02/28/2022    Altered mental status 02/28/2022    Delirium 07/22/2021     Impression:  1. Altered mental status -probable metabolic encephalopathy  2. Noninsulin dependent diabetes mellitus type 2hyperglycemic-hemoglobin A1c 12.3 with resolving hyperosmolar state  3. New onset tonic-clonic seizure  4. 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 History of medical noncompliance  6. History of old lacunar infarcts  7. Hypertension  8. History of spinal stenosis  9.   Acute kidney injuryetiology unknown    Plan:  Discharged to extended-care facility today    Keppra 500 mg twice daily  Lantus 60 units subcu daily    BMP in 4 days-to be sent to nephrology i.e. Dr. Janessa Nixon home meds  Lipitor 40 mg p.o. daily  Calcium carbonate 500 mg 1 3 times daily as needed    Hold Metformin    Follow-up primary care physician as directed    Giovana Babb DO, D.O.  3/10/2022  11:45 AM

## 2022-03-10 NOTE — CARE COORDINATION
3/10/2022 1400 CM note: Negative covid 3/7/22. Per [facility], pt accepted for skilled rehab and bed is available. For SNF, NO PRECERT, HENS completed by ASHLEY, PAYAM is signed. SNF liason Mikaela garciamed pt to be discharged today and pt's son Magui Son  will transfer patient to SNF. N-N [phone].  Delmy RAMIRES

## 2022-03-10 NOTE — PROGRESS NOTES
Room #:   6274/8483-05    Date: 3/10/2022       Patient Name: Lady Amaro  : 1945      MRN: [id number]     Patient unavailable for physical therapy treatment due to patient sleeping. Will attempt PT treatment at a later time. Thank you.         Geovanny Good, PT

## 2022-03-10 NOTE — PROGRESS NOTES
Nephrology Progress Note  [facility]     Reason for Consult: JAYA   Requesting Physician:  Dr Kane Buckner   Date of Service: 3/10/2022   Chief Complaint:  AMS   History Obtained From:  Patient, medical record      History of Present Ilness:      FROM 3-9    Mr Krystian Ham is a 78year old male who we are consulted on for evaluation and management of acute kidney injury    He is seen and examined in his room. He cannot provide a clear history of his presentation. History as per the medical record. Apparently presented to hospital on February 28 with altered mental status and seizure disorder. Currently he offers no specific complaints. Denies chest pain or palpitations or shortness of breath. No abdominal pain, nausea or vomiting, diarrhea or constipation. He states he has had a poor appetite. No urinary complaints. He was treated for new onset seizure disorder. Also with hyperglycemic state    No prior history of kidney disease. Creatinine on presentation on February 28 was 0.6. His creatinine remained stable to the morning of February when it increased to 2.4 and has repeated at 2.5      SUBJECTIVE  Seen and examined  Hard of hearing but no voiced complaints. No chest pain, shortness of breath, abdominal pain, nausea  No urinary complaints  Good appetite          Past Medical History:        Diagnosis Date    Type 2 diabetes mellitus with hyperglycemia, without long-term current use of insulin ([facility] [address]        Past Surgical History:    History reviewed. No pertinent surgical history.     Current Medications:    Current Facility-Administered Medications: enoxaparin (LOVENOX) injection 30 mg, 30 mg, SubCUTAneous, Daily  0.9 % sodium chloride infusion, , IntraVENous, Continuous  calcium carbonate (TUMS) chewable tablet 500 mg, 500 mg, Oral, TID PRN  levETIRAcetam (KEPPRA) tablet 500 mg, 500 mg, Oral, BID  insulin lispro (HUMALOG) injection vial 0-12 Units, 0-12 Units, SubCUTAneous, 4x Daily AC & HS  atorvastatin (LIPITOR) tablet 40 mg, 40 mg, Oral, Nightly  insulin glargine (LANTUS) injection vial 6 Units, 6 Units, SubCUTAneous, Daily  sodium chloride flush 0.9 % injection 5-40 mL, 5-40 mL, IntraVENous, 2 times per day  sodium chloride flush 0.9 % injection 5-40 mL, 5-40 mL, IntraVENous, PRN  0.9 % sodium chloride infusion, 25 mL, IntraVENous, PRN  LORazepam (ATIVAN) injection 0.5 mg, 0.5 mg, IntraVENous, Q6H PRN  sodium chloride flush 0.9 % injection 5-40 mL, 5-40 mL, IntraVENous, 2 times per day  sodium chloride flush 0.9 % injection 5-40 mL, 5-40 mL, IntraVENous, PRN  0.9 % sodium chloride infusion, 25 mL, IntraVENous, PRN  aspirin EC tablet 81 mg, 81 mg, Oral, Daily  alogliptin (NESINA) tablet 12.5 mg, 12.5 mg, Oral, Daily  [Held by provider] metFORMIN (GLUCOPHAGE) tablet 1,000 mg, 1,000 mg, Oral, BID WC  acetaminophen (TYLENOL) suppository 650 mg, 650 mg, Rectal, Q4H PRN  acetaminophen (TYLENOL) tablet 500 mg, 500 mg, Oral, Q6H PRN  polyethylene glycol (GLYCOLAX) packet 17 g, 17 g, Oral, Daily PRN  dextrose 5 % solution, 100 mL/hr, IntraVENous, PRN  dextrose 5 % and 0.45 % sodium chloride infusion, , IntraVENous, Continuous PRN  glucose (GLUTOSE) 40 % oral gel 15 g, 15 g, Oral, PRN  glucagon (rDNA) injection 1 mg, 1 mg, IntraMUSCular, PRN  dextrose 5 % solution, 100 mL/hr, IntraVENous, PRN  dextrose bolus (hypoglycemia) 10% 125 mL, 125 mL, IntraVENous, PRN **OR** dextrose bolus (hypoglycemia) 10% 250 mL, 250 mL, IntraVENous, PRN    Allergies:  Patient has no known allergies. Social History:    No tobacco, no EtOH     Family History:   No kidney disease     Review of Systems:   Pertinent positives stated above in HPI. All other systems were reviewed and were negative.     Physical exam:   Constitutional:  VITALS:  BP (!) 160/79   Pulse 70   Temp 97.2 °F (36.2 °C) (Temporal)   Resp 16   Ht 5' 6\" (1.676 m)   Wt 146 lb (66.2 kg)   SpO2 100%   BMI 23.57 kg/m²     Gen: alert, awake, no acute distress. Hard of hearing   Eyes: anicteric sclerae, eomi  HEENT: atraumatic/normocephalic  Neck: no jvd   Lungs: clear to ascultation bilaterally, equal lung expansion  CV: RRR, no murmurs   Abdomen: soft, nontender, nondistended, normoactive bowel sounds  Extremitiy: no edema  : no CVA tenderness  Skin: no rash, tugor wnl  Neuro: no focal deficits  Psych: cooperative and appropriate      Data:       Last 3 CMP:    Recent Labs     03/08/22  1407 03/09/22  0819 03/10/22  0517    140 138   K 4.4 4.4 4.5    105 103   CO2 23 23 23   BUN 36* 34* 29*   CREATININE 2.5* 2.3* 1.7*   GLUCOSE 146* 144* 117*   CALCIUM 9.1 9.4 8.8   PROT  --   --  6.1*   LABALBU  --   --  3.1*   BILITOT  --   --  0.7   ALKPHOS  --   --  72   AST  --   --  26   ALT  --   --  14         Last 3 Glucose:     Recent Labs     03/08/22  1407 03/09/22  0819 03/10/22  0517   GLUCOSE 146* 144* 117*         Last 3 POC Glucose:     No results for input(s): POCGLU in the last 72 hours. Last 3 CK, CKMB, Troponin  No results for input(s): CKTOTAL, CKMB, TROPONINI in the last 72 hours. Last 3 CBC:  Recent Labs     03/08/22  0546 03/09/22  0819 03/10/22  0517   WBC 12.5* 10.3 7.4   RBC 4.21 4.36 4.40   HGB 12.8 13.0 13.1   HCT 39.4 40.2 40.7   MCV 93.6 92.2 92.5   MCH 30.4 29.8 29.8   MCHC 32.5 32.3 32.2   RDW 13.9 14.1 14.0    273 267   MPV 9.0 8.7 8.9       Last 3 Hepatic Function Panel:    Recent Labs     03/10/22  0517   ALKPHOS 72   ALT 14   AST 26   PROT 6.1*   BILITOT 0.7   LABALBU 3.1*       Albumin:  Recent Labs     03/10/22  0517   LABALBU 3.1*       Calcium:  Recent Labs     03/10/22  0517   CALCIUM 8.8       Ionized Calcium:  No results for input(s): IONCA in the last 72 hours. Magnesium:    Recent Labs     03/10/22  0517   MG 2.1         ABGs:  No results for input(s): PHART, PO2ART, POQ8KNC, JGY6IRQ, BEART, Z2GTYQAK in the last 72 hours. Lactic Acid:  No results for input(s): LACTA in the last 72 hours.     Last 3 Amylase:  No results for input(s): AMYLASE in the last 72 hours. Last 3 Lipase:  No results for input(s): LIPASE in the last 72 hours. Last 3 BNP:  No results for input(s): BNP in the last 72 hours. US RETROPERITONEAL LIMITED [[id number]] Collected: 03/08/22 2048     Order Status: Completed Updated: 03/08/22 2053     Narrative:       EXAMINATION:   ULTRASOUND OF THE KIDNEYS     3/8/2022 2:56 pm     COMPARISON:   None. HISTORY:   ORDERING SYSTEM PROVIDED HISTORY: ivette   TECHNOLOGIST PROVIDED HISTORY:   Reason for exam:->ivette   What reading provider will be dictating this exam?->CRC     FINDINGS:   The right kidney measures 13 in length and the left kidney measures 12 in   length.  Right-sided extrarenal pelvis noted. A 3.1 cm maximal diameter cyst is noted in the left kidney. Kidneys demonstrate normal cortical echogenicity.  No hydronephrosis or   intrarenal stones. Mildly distended bladder.  Only the right-sided ureteral jet was noted, per   technologist notes.      Impression:       No evidence of hydronephrosis. Mildly distended bladder. Assessment/Plan      1 Acute kidney injury  Creatinine 0.6 on 2/28 admission  Creatinine 0.6 to 0.8 but creatinine 2.4 on 3/8 and then 2/5 on 3/8 PM and 2/3 on 3/9    Unclear etiology of worsening renal function  Possible element of urinary retention at it appears his brothers catheter was removed on March 7   Renal ultrasound with no hydronephrosis but distended bladder  Urinalysis on admission with 30 protein and no RBCs    At this time, will follow renal function and urine output  Continue IV fluids  PVR was checked on 3/9 - it was 116cc     2.  Hypertension  Blood pressure above long-term goal today but has been better controlled  Blood pressure and adjust regimen as needed      Possible discharge today  Would check BMP next week       Thank you for the opportunity to participate in the care of Mr Sun Marrero

## 2022-03-10 NOTE — PROGRESS NOTES
Report called to Ashely Bennett at [address] ([phone]). Advised that ETA for pick-up is [phone].

## 2022-03-10 NOTE — CARE COORDINATION
Copy of IMM letter being emailed to Tarun Tran at her request to [email or web address].  Electronically signed by Enmanuel Richard on 3/10/2022 at 11:58 AM

## 2023-01-12 NOTE — PROGRESS NOTES
[facility]  [facility]  Progress Note    NAME: Ac Garcia  MR:  [id number]  :   1945  DATE OF SERVICE:22    This is a face to face encounter with Ac Garcia 78 y.o. male on 22    CHIEF COMPLAINT     ID following for   Chief Complaint   Patient presents with    Fall    Seizures     HISTORY OF PRESENT ILLNESS   Pt seen and examined  22   In bed wants to sleep nad has rash no new issues    3/7/2022  In bed more awake   On RA  Has gi upset informed nurse wants tums    Patient is tolerating medications. No reported adverse drug reactions. REVIEW OF SYSTEMS     As stated above in the chief complaint, otherwise negative.   CURRENT MEDICATIONS      sodium chloride  1,000 mL IntraVENous Once    levETIRAcetam  500 mg Oral BID    insulin lispro  0-12 Units SubCUTAneous 4x Daily AC & HS    atorvastatin  40 mg Oral Nightly    insulin glargine  6 Units SubCUTAneous Daily    sodium chloride flush  5-40 mL IntraVENous 2 times per day    sodium chloride flush  5-40 mL IntraVENous 2 times per day    aspirin  81 mg Oral Daily    alogliptin  12.5 mg Oral Daily    [Held by provider] metFORMIN  1,000 mg Oral BID WC    enoxaparin  40 mg SubCUTAneous Daily     Continuous Infusions:   sodium chloride      sodium chloride Stopped (22 0600)    0.9% NaCl with KCl 20 mEq 50 mL/hr at 22 0834    dextrose      dextrose 5 % and 0.45 % NaCl      dextrose       PRN Meds:calcium carbonate, sodium chloride flush, sodium chloride, LORazepam, sodium chloride flush, sodium chloride, acetaminophen, acetaminophen, polyethylene glycol, dextrose, dextrose 5 % and 0.45 % NaCl, glucose, glucagon (rDNA), dextrose, dextrose bolus (hypoglycemia) **OR** dextrose bolus (hypoglycemia)    PHYSICAL EXAM     BP (!) 168/71   Pulse 70   Temp 98.2 °F (36.8 °C) (Oral)   Resp 18   Ht 5' 6\" (1.676 m)   Wt 148 lb (67.1 kg)   SpO2 100%   BMI 23.89 kg/m²   Temp  Avg: Topical Clindamycin Pregnancy And Lactation Text: This medication is Pregnancy Category B and is considered safe during pregnancy. It is unknown if it is excreted in breast milk. 98.1 °F (36.7 °C)  Min: 97.9 °F (36.6 °C)  Max: 98.2 °F (36.8 °C)  Constitutional:  The patient is awake in bed   Skin:      Dry   HEENT:    .  AT/NC  Neck:    Supple to movements. Chest:   No use of accessory muscles to breathe. Symmetrical expansion. RA  Cardiovascular:  S1 and S2 are rhythmic and regular. No murmurs appreciated. Abdomen:   Positive bowel sounds to auscultation. Benign to palpation. distended  Extremities:   No clubbing, no cyanosis, no edema.   CNS    Awake   Lines: piv      DIAGNOSTIC RESULTS   Radiology:    Recent Labs     03/05/22  1909 03/07/22  0536 03/08/22  0546   WBC 7.8 7.5 12.5*   RBC 4.22 4.28 4.21   HGB 12.6 12.7 12.8   HCT 38.3 38.8 39.4   MCV 90.8 90.7 93.6   MCH 29.9 29.7 30.4   MCHC 32.9 32.7 32.5   RDW 13.9 13.6 13.9    252 266   MPV 8.8 8.7 9.0     Recent Labs     03/05/22  1909 03/07/22  0536 03/08/22  0546    135 138   K 4.0 3.9 4.9    103 102   CO2 22 24 26   BUN 18 13 33*   CREATININE 0.8 0.7 2.4*   GLUCOSE 236* 143* 255*   CALCIUM 8.2* 8.4* 9.0     Lab Results   Component Value Date    CRP 1.4 (H) 03/01/2022     Lab Results   Component Value Date    SEDRATE 21 (H) 03/01/2022        Lab Results   Component Value Date    CHOL 205 03/01/2022    TRIG 104 03/01/2022    HDL 59 03/01/2022    LDLCALC 125 03/01/2022    LABVLDL 21 03/01/2022     Microbiology:   hsv csf neg   Csf cx ngtd  meningitis encephalitis panel neg     FINAL IMPRESSION    Pt had   Chief Complaint   Patient presents with    Fall    Seizures    Admitted for Infestation by bed bug [B88.8]  Seizure ([facility] [address] [R56.9]  Altered mental status [R41.82]  Altered mental status, unspecified altered mental status type [R41.82]  Hyperosmolar hyperglycemic state (HHS) ([facility] [address] [E11.00, E11.65]  On treatment for   Leukocytosis resolved  eval for CNS infection   Has Small chronic right frontal lobe infarction.  Multiple chronic lacunar   Infarctions  ivette check bladder scan f/u cr       Off atbx  Can dc f/u prn Spironolactone Pregnancy And Lactation Text: This medication can cause feminization of the male fetus and should be avoided during pregnancy. The active metabolite is also found in breast milk. Benzoyl Peroxide Counseling: Patient counseled that medicine may cause skin irritation and bleach clothing.  In the event of skin irritation, the patient was advised to reduce the amount of the drug applied or use it less frequently.   The patient verbalized understanding of the proper use and possible adverse effects of benzoyl peroxide.  All of the patient's questions and concerns were addressed. · Monitor labs    Imaging and labs were reviewed per medical records. Thank you for involving me in the care of Ellis Friendly will continue to follow. Please do not hesitate to call for any questions or concerns.     Electronically signed by Fátima Soares MD on 3/8/2022 at 12:18 PM Dapsone Pregnancy And Lactation Text: This medication is Pregnancy Category C and is not considered safe during pregnancy or breast feeding. High Dose Vitamin A Counseling: Side effects reviewed, pt to contact office should one occur. Azithromycin Counseling:  I discussed with the patient the risks of azithromycin including but not limited to GI upset, allergic reaction, drug rash, diarrhea, and yeast infections. Tetracycline Pregnancy And Lactation Text: This medication is Pregnancy Category D and not consider safe during pregnancy. It is also excreted in breast milk. Topical Retinoid counseling:  Patient advised to apply a pea-sized amount only at bedtime and wait 30 minutes after washing their face before applying.  If too drying, patient may add a non-comedogenic moisturizer. The patient verbalized understanding of the proper use and possible adverse effects of retinoids.  All of the patient's questions and concerns were addressed. Topical Sulfur Applications Pregnancy And Lactation Text: This medication is Pregnancy Category C and has an unknown safety profile during pregnancy. It is unknown if this topical medication is excreted in breast milk. Tazorac Pregnancy And Lactation Text: This medication is not safe during pregnancy. It is unknown if this medication is excreted in breast milk. Azelaic Acid Counseling: Patient counseled that medicine may cause skin irritation and to avoid applying near the eyes.  In the event of skin irritation, the patient was advised to reduce the amount of the drug applied or use it less frequently.   The patient verbalized understanding of the proper use and possible adverse effects of azelaic acid.  All of the patient's questions and concerns were addressed. Birth Control Pills Pregnancy And Lactation Text: This medication should be avoided if pregnant and for the first 30 days post-partum. Isotretinoin Counseling: Patient should get monthly blood tests, not donate blood, not drive at night if vision affected, not share medication, and not undergo elective surgery for 6 months after tx completed. Side effects reviewed, pt to contact office should one occur. Erythromycin Counseling:  I discussed with the patient the risks of erythromycin including but not limited to GI upset, allergic reaction, drug rash, diarrhea, increase in liver enzymes, and yeast infections. Aklief counseling:  Patient advised to apply a pea-sized amount only at bedtime and wait 30 minutes after washing their face before applying.  If too drying, patient may add a non-comedogenic moisturizer.  The most commonly reported side effects including irritation, redness, scaling, dryness, stinging, burning, itching, and increased risk of sunburn.  The patient verbalized understanding of the proper use and possible adverse effects of retinoids.  All of the patient's questions and concerns were addressed. Bactrim Pregnancy And Lactation Text: This medication is Pregnancy Category D and is known to cause fetal risk.  It is also excreted in breast milk. Topical Retinoid Pregnancy And Lactation Text: This medication is Pregnancy Category C. It is unknown if this medication is excreted in breast milk. Winlevi Counseling:  I discussed with the patient the risks of topical clascoterone including but not limited to erythema, scaling, itching, and stinging. Patient voiced their understanding. Include Pregnancy/Lactation Warning?: No Topical Sulfur Applications Counseling: Topical Sulfur Counseling: Patient counseled that this medication may cause skin irritation or allergic reactions.  In the event of skin irritation, the patient was advised to reduce the amount of the drug applied or use it less frequently.   The patient verbalized understanding of the proper use and possible adverse effects of topical sulfur application.  All of the patient's questions and concerns were addressed. Benzoyl Peroxide Pregnancy And Lactation Text: This medication is Pregnancy Category C. It is unknown if benzoyl peroxide is excreted in breast milk. Tetracycline Counseling: Patient counseled regarding possible photosensitivity and increased risk for sunburn.  Patient instructed to avoid sunlight, if possible.  When exposed to sunlight, patients should wear protective clothing, sunglasses, and sunscreen.  The patient was instructed to call the office immediately if the following severe adverse effects occur:  hearing changes, easy bruising/bleeding, severe headache, or vision changes.  The patient verbalized understanding of the proper use and possible adverse effects of tetracycline.  All of the patient's questions and concerns were addressed. Patient understands to avoid pregnancy while on therapy due to potential birth defects. Doxycycline Counseling:  Patient counseled regarding possible photosensitivity and increased risk for sunburn.  Patient instructed to avoid sunlight, if possible.  When exposed to sunlight, patients should wear protective clothing, sunglasses, and sunscreen.  The patient was instructed to call the office immediately if the following severe adverse effects occur:  hearing changes, easy bruising/bleeding, severe headache, or vision changes.  The patient verbalized understanding of the proper use and possible adverse effects of doxycycline.  All of the patient's questions and concerns were addressed. High Dose Vitamin A Pregnancy And Lactation Text: High dose vitamin A therapy is contraindicated during pregnancy and breast feeding. Azithromycin Pregnancy And Lactation Text: This medication is considered safe during pregnancy and is also secreted in breast milk. Dapsone Counseling: I discussed with the patient the risks of dapsone including but not limited to hemolytic anemia, agranulocytosis, rashes, methemoglobinemia, kidney failure, peripheral neuropathy, headaches, GI upset, and liver toxicity.  Patients who start dapsone require monitoring including baseline LFTs and weekly CBCs for the first month, then every month thereafter.  The patient verbalized understanding of the proper use and possible adverse effects of dapsone.  All of the patient's questions and concerns were addressed. Isotretinoin Pregnancy And Lactation Text: This medication is Pregnancy Category X and is considered extremely dangerous during pregnancy. It is unknown if it is excreted in breast milk. Azelaic Acid Pregnancy And Lactation Text: This medication is considered safe during pregnancy and breast feeding. Spironolactone Counseling: Patient advised regarding risks of diarrhea, abdominal pain, hyperkalemia, birth defects (for female patients), liver toxicity and renal toxicity. The patient may need blood work to monitor liver and kidney function and potassium levels while on therapy. The patient verbalized understanding of the proper use and possible adverse effects of spironolactone.  All of the patient's questions and concerns were addressed. Topical Clindamycin Counseling: Patient counseled that this medication may cause skin irritation or allergic reactions.  In the event of skin irritation, the patient was advised to reduce the amount of the drug applied or use it less frequently.   The patient verbalized understanding of the proper use and possible adverse effects of clindamycin.  All of the patient's questions and concerns were addressed. Detail Level: Zone Aklief Pregnancy And Lactation Text: It is unknown if this medication is safe to use during pregnancy.  It is unknown if this medication is excreted in breast milk.  Breastfeeding women should use the topical cream on the smallest area of the skin for the shortest time needed while breastfeeding.  Do not apply to nipple and areola. Sarecycline Counseling: Patient advised regarding possible photosensitivity and discoloration of the teeth, skin, lips, tongue and gums.  Patient instructed to avoid sunlight, if possible.  When exposed to sunlight, patients should wear protective clothing, sunglasses, and sunscreen.  The patient was instructed to call the office immediately if the following severe adverse effects occur:  hearing changes, easy bruising/bleeding, severe headache, or vision changes.  The patient verbalized understanding of the proper use and possible adverse effects of sarecycline.  All of the patient's questions and concerns were addressed. Tazorac Counseling:  Patient advised that medication is irritating and drying.  Patient may need to apply sparingly and wash off after an hour before eventually leaving it on overnight.  The patient verbalized understanding of the proper use and possible adverse effects of tazorac.  All of the patient's questions and concerns were addressed. Winlevi Pregnancy And Lactation Text: This medication is considered safe during pregnancy and breastfeeding. Erythromycin Pregnancy And Lactation Text: This medication is Pregnancy Category B and is considered safe during pregnancy. It is also excreted in breast milk. Birth Control Pills Counseling: Birth Control Pill Counseling: I discussed with the patient the potential side effects of OCPs including but not limited to increased risk of stroke, heart attack, thrombophlebitis, deep venous thrombosis, hepatic adenomas, breast changes, GI upset, headaches, and depression.  The patient verbalized understanding of the proper use and possible adverse effects of OCPs. All of the patient's questions and concerns were addressed. Bactrim Counseling:  I discussed with the patient the risks of sulfa antibiotics including but not limited to GI upset, allergic reaction, drug rash, diarrhea, dizziness, photosensitivity, and yeast infections.  Rarely, more serious reactions can occur including but not limited to aplastic anemia, agranulocytosis, methemoglobinemia, blood dyscrasias, liver or kidney failure, lung infiltrates or desquamative/blistering drug rashes. Doxycycline Pregnancy And Lactation Text: This medication is Pregnancy Category D and not consider safe during pregnancy. It is also excreted in breast milk but is considered safe for shorter treatment courses. Minocycline Counseling: Patient advised regarding possible photosensitivity and discoloration of the teeth, skin, lips, tongue and gums.  Patient instructed to avoid sunlight, if possible.  When exposed to sunlight, patients should wear protective clothing, sunglasses, and sunscreen.  The patient was instructed to call the office immediately if the following severe adverse effects occur:  hearing changes, easy bruising/bleeding, severe headache, or vision changes.  The patient verbalized understanding of the proper use and possible adverse effects of minocycline.  All of the patient's questions and concerns were addressed. Detail Level: Detailed

## 2023-02-14 ENCOUNTER — HOSPITAL ENCOUNTER (EMERGENCY)
Age: 78
Discharge: HOME | End: 2023-02-14
Attending: EMERGENCY MEDICINE
Payer: MEDICARE

## 2023-02-14 ENCOUNTER — APPOINTMENT (OUTPATIENT)
Dept: CT IMAGING | Age: 78
End: 2023-02-14
Payer: MEDICARE

## 2023-02-14 VITALS
RESPIRATION RATE: 18 BRPM | OXYGEN SATURATION: 99 % | TEMPERATURE: 98.6 F | DIASTOLIC BLOOD PRESSURE: 87 MMHG | SYSTOLIC BLOOD PRESSURE: 131 MMHG | HEART RATE: 98 BPM

## 2023-02-14 DIAGNOSIS — R46.89 AGGRESSIVE BEHAVIOR OF ADULT: ICD-10-CM

## 2023-02-14 DIAGNOSIS — E87.6 HYPOKALEMIA: Primary | ICD-10-CM

## 2023-02-14 LAB
ANION GAP SERPL CALCULATED.3IONS-SCNC: 17 MMOL/L (ref 7–16)
BACTERIA: ABNORMAL /HPF
BASOPHILS ABSOLUTE: 0.04 E9/L (ref 0–0.2)
BASOPHILS RELATIVE PERCENT: 0.3 % (ref 0–2)
BILIRUBIN URINE: ABNORMAL
BLOOD, URINE: ABNORMAL
BUN BLDV-MCNC: 38 MG/DL (ref 6–23)
CALCIUM SERPL-MCNC: 9.4 MG/DL (ref 8.6–10.2)
CHLORIDE BLD-SCNC: 105 MMOL/L (ref 98–107)
CLARITY: CLEAR
CO2: 22 MMOL/L (ref 22–29)
COLOR: YELLOW
CREAT SERPL-MCNC: 1 MG/DL (ref 0.7–1.2)
EOSINOPHILS ABSOLUTE: 0.06 E9/L (ref 0.05–0.5)
EOSINOPHILS RELATIVE PERCENT: 0.4 % (ref 0–6)
GFR SERPL CREATININE-BSD FRML MDRD: >60 ML/MIN/1.73
GLUCOSE BLD-MCNC: 151 MG/DL (ref 74–99)
GLUCOSE URINE: NEGATIVE MG/DL
HCT VFR BLD CALC: 39.4 % (ref 37–54)
HEMOGLOBIN: 13.5 G/DL (ref 12.5–16.5)
IMMATURE GRANULOCYTES #: 0.1 E9/L
IMMATURE GRANULOCYTES %: 0.7 % (ref 0–5)
KETONES, URINE: 40 MG/DL
LEUKOCYTE ESTERASE, URINE: NEGATIVE
LYMPHOCYTES ABSOLUTE: 0.87 E9/L (ref 1.5–4)
LYMPHOCYTES RELATIVE PERCENT: 6.3 % (ref 20–42)
MAGNESIUM: 1.9 MG/DL (ref 1.6–2.6)
MCH RBC QN AUTO: 30.5 PG (ref 26–35)
MCHC RBC AUTO-ENTMCNC: 34.3 % (ref 32–34.5)
MCV RBC AUTO: 89.1 FL (ref 80–99.9)
MONOCYTES ABSOLUTE: 0.74 E9/L (ref 0.1–0.95)
MONOCYTES RELATIVE PERCENT: 5.4 % (ref 2–12)
NEUTROPHILS ABSOLUTE: 11.92 E9/L (ref 1.8–7.3)
NEUTROPHILS RELATIVE PERCENT: 86.9 % (ref 43–80)
NITRITE, URINE: NEGATIVE
PDW BLD-RTO: 14.5 FL (ref 11.5–15)
PH UA: 5.5 (ref 5–9)
PLATELET # BLD: 384 E9/L (ref 130–450)
PMV BLD AUTO: 8.8 FL (ref 7–12)
POTASSIUM SERPL-SCNC: 2.7 MMOL/L (ref 3.5–5)
POTASSIUM SERPL-SCNC: 3.5 MMOL/L (ref 3.5–5)
PROTEIN UA: 30 MG/DL
RBC # BLD: 4.42 E12/L (ref 3.8–5.8)
RBC UA: ABNORMAL /HPF (ref 0–2)
SODIUM BLD-SCNC: 144 MMOL/L (ref 132–146)
SPECIFIC GRAVITY UA: >=1.03 (ref 1–1.03)
UROBILINOGEN, URINE: 0.2 E.U./DL
WBC # BLD: 13.7 E9/L (ref 4.5–11.5)
WBC UA: ABNORMAL /HPF (ref 0–5)

## 2023-02-14 PROCEDURE — 93005 ELECTROCARDIOGRAM TRACING: CPT | Performed by: EMERGENCY MEDICINE

## 2023-02-14 PROCEDURE — 84132 ASSAY OF SERUM POTASSIUM: CPT

## 2023-02-14 PROCEDURE — 6360000002 HC RX W HCPCS: Performed by: EMERGENCY MEDICINE

## 2023-02-14 PROCEDURE — 80048 BASIC METABOLIC PNL TOTAL CA: CPT

## 2023-02-14 PROCEDURE — 99284 EMERGENCY DEPT VISIT MOD MDM: CPT

## 2023-02-14 PROCEDURE — 36415 COLL VENOUS BLD VENIPUNCTURE: CPT

## 2023-02-14 PROCEDURE — 70450 CT HEAD/BRAIN W/O DYE: CPT

## 2023-02-14 PROCEDURE — 81001 URINALYSIS AUTO W/SCOPE: CPT

## 2023-02-14 PROCEDURE — 85025 COMPLETE CBC W/AUTO DIFF WBC: CPT

## 2023-02-14 PROCEDURE — 83735 ASSAY OF MAGNESIUM: CPT

## 2023-02-14 RX ORDER — POTASSIUM CHLORIDE 7.45 MG/ML
10 INJECTION INTRAVENOUS ONCE
Status: COMPLETED | OUTPATIENT
Start: 2023-02-14 | End: 2023-02-14

## 2023-02-14 RX ADMIN — POTASSIUM CHLORIDE 10 MEQ: 10 INJECTION, SOLUTION INTRAVENOUS at 11:00

## 2023-02-14 RX ADMIN — POTASSIUM CHLORIDE 10 MEQ: 10 INJECTION, SOLUTION INTRAVENOUS at 13:04

## 2023-02-14 NOTE — LETTER
[facility] Emergency Department  [address]  Phone: [phone]               February 14, 2023    Patient: Triston Zapata   YOB: 1945   Date of Visit: 2/14/2023       To Whom It May Concern:    Manju Patel was seen and treated in our emergency department on 2/14/2023. He .       Sincerely,       Romulo Dale RN         Signature:__________________________________

## 2023-02-14 NOTE — ED PROVIDER NOTES
[address]        Pt Name: Triston Zapata  MRN: [id number]  [place] 1945  Date of evaluation: 2/14/2023  Provider: Lianna Brown DO  PCP: Pham Soler MD  Note Started: 8:32 AM EST 2/14/23    CHIEF COMPLAINT       Chief Complaint   Patient presents with    Aggressive Behavior     Pt sent in from nursing home for aggressive behavior. Pt was crawling out of chair and spitting and punching staff at nursing home       HISTORY OF PRESENT ILLNESS: 1 or more Elements   History From: Patient and EMS report    Limitations to history : Language has aphasia from previous stroke    Triston Zapata is a 78 y.o. male who presents to the ED for evaluation of altered mental status. The facility that he lives at reports that he has been exhibiting abnormal behavior today's been very aggressive biting and spitting at staff and has been throwing himself out of his chair. They do report that he is aphasic at baseline from previous stroke. Patient has right-sided deficits from previous stroke. Patient voices no complaints. Denies chest pain, shortness of breath, nausea, vomiting, or abdominal pain. Patient not aggressive upon presentation to the ED and is cooperative with ED staff. Nursing Notes were all reviewed and agreed with or any disagreements were addressed in the HPI. REVIEW OF EXTERNAL NOTE :           REVIEW OF SYSTEMS :           Positives and Pertinent negatives as per HPI. SURGICAL HISTORY   History reviewed. No pertinent surgical history.     CURRENTMEDICATIONS       Previous Medications    ALOGLIPTIN (NESINA) 12.5 MG TABS TABLET    Take 1 tablet by mouth daily    ASPIRIN 81 MG EC TABLET    Take 1 tablet by mouth daily    ATORVASTATIN (LIPITOR) 40 MG TABLET    Take 1 tablet by mouth nightly    INSULIN GLARGINE (LANTUS) 100 UNIT/ML INJECTION VIAL    Inject 6 Units into the skin daily    INSULIN LISPRO (HUMALOG) 100 UNIT/ML INJECTION VIAL    Inject 0-3 Units into the skin nightly    INSULIN LISPRO (HUMALOG) 100 UNIT/ML INJECTION VIAL    Inject 0-6 Units into the skin 3 times daily (with meals)    LEVETIRACETAM (KEPPRA) 500 MG TABLET    Take 1 tablet by mouth 2 times daily    LISINOPRIL (PRINIVIL;ZESTRIL) 10 MG TABLET    Take 1 tablet by mouth daily    THIAMINE MONONITRATE (THIAMINE) 100 MG TABLET    Take 1 tablet by mouth daily       ALLERGIES     Patient has no known allergies. FAMILYHISTORY     History reviewed. No pertinent family history. SOCIAL HISTORY       Social History     Tobacco Use    Smoking status: Never    Smokeless tobacco: Never       SCREENINGS        [place] Coma Scale  Eye Opening: Spontaneous  Best Verbal Response: Oriented  Best Motor Response: Obeys commands  [place] Coma Scale Score: 15                CIWA Assessment  BP: 131/87  Heart Rate: 98           PHYSICAL EXAM  1 or more Elements     ED Triage Vitals   BP Temp Temp src Pulse Resp SpO2 Height Weight   -- -- -- -- -- -- -- --       Constitutional/General: Alert and oriented x 2. Patient is alert to person and place but had difficulty telling me the month and year. Patient is cooperative and is not exhibiting any aggressive behavior in the ED  Head: Normocephalic without evidence of craniofacial trauma noted  Eyes: PERRL, EOMI, conjunctiva normal, sclera non icteric  ENT:  Oropharynx clear, handling secretions,   Neck: Supple, full ROM, no stridor, no midline cervical spine tenderness or bony crepitance. No step-off  Respiratory: Lungs clear to auscultation bilaterally, no wheezes, rales, or rhonchi. Not in respiratory distress  Cardiovascular:  Regular rate. Regular rhythm. No murmurs, no gallops, no rubs. 2+ distal pulses. Chest: No chest wall tenderness  GI:  Abdomen Soft, Non tender, Non distended. No rebound, guarding, or rigidity. No pulsatile masses. Musculoskeletal: Moves all extremities x 4.  Warm and well perfused, no clubbing, no cyanosis, no edema. Capillary refill <3 seconds  Integument: skin warm and dry. No rashes. Neurologic: Patient has deficits of the right upper and lower extremity. Not able to move secondary to stroke history. Patient has mild expressive aphasia which is at baseline according to nursing report. Psychiatric: Normal Affect            DIAGNOSTIC RESULTS   LABS:    Labs Reviewed   BASIC METABOLIC PANEL - Abnormal; Notable for the following components:       Result Value    Potassium 2.7 (*)     Anion Gap 17 (*)     Glucose 151 (*)     BUN 38 (*)     All other components within normal limits    Narrative:     Afua Dixon tel. [phone],  Chemistry results called to and read back by Snow Cabezas RN, 02/14/2023  10:02, by PROSPERWILLIAM   CBC WITH AUTO DIFFERENTIAL - Abnormal; Notable for the following components:    WBC 13.7 (*)     Neutrophils % 86.9 (*)     Lymphocytes % 6.3 (*)     Neutrophils Absolute 11.92 (*)     Lymphocytes Absolute 0.87 (*)     All other components within normal limits   URINALYSIS - Abnormal; Notable for the following components:    Bilirubin Urine MODERATE (*)     Ketones, Urine 40 (*)     Blood, Urine LARGE (*)     Protein, UA 30 (*)     All other components within normal limits   MICROSCOPIC URINALYSIS - Abnormal; Notable for the following components:    RBC, UA 10-20 (*)     Bacteria, UA RARE (*)     All other components within normal limits   MAGNESIUM   POTASSIUM       As interpreted by me, the above displayed labs are abnormal. All other labs obtained during this visit were within normal range or not returned as of this dictation. EKG Interpretation    Interpreted by emergency department physician    Rhythm: sinus tachycardia  Rate: 113  Axis: normal  Ectopy: none  Conduction: LVH by aVL criteria  ST Segments: no acute change  T Waves: no acute change  Q Waves: none    Clinical Impression: Other than rate EKG is grossly unchanged from prior EKGs.   There is a lot of baseline artifact. Rosalind Crain DO          RADIOLOGY:   Non-plain film images such as CT, Ultrasound and MRI are read by the radiologist. Plain radiographic images are visualized and preliminarily interpreted by the ED Provider with the below findings:        Interpretation per the Radiologist below, if available at the time of this note:    CT HEAD WO CONTRAST   Final Result   1. There is no acute intracranial abnormality. Specifically, there is no   intracranial hemorrhage. 2. Atrophy and significant periventricular leukomalacia,   3. Old right frontal lobe small cortical infarct. No results found. No results found. PROCEDURES   Unless otherwise noted below, none          CRITICAL CARE TIME (.cct)       PAST MEDICAL HISTORY/Chronic Conditions Affecting Care      has a past medical history of Type 2 diabetes mellitus with hyperglycemia, without long-term current use of insulin ([facility] [address] EMERGENCY DEPARTMENT COURSE    Vitals:    Vitals:    02/14/23 0951 02/14/23 1125   BP: 114/85 131/87   Pulse: 98    Resp: 18    Temp: 98.6 °F (37 °C)    SpO2: 99%        Patient was given the following medications:  Medications   potassium chloride 10 mEq/100 mL IVPB (Peripheral Line) (0 mEq IntraVENous Stopped 2/14/23 1259)   potassium chloride 10 mEq/100 mL IVPB (Peripheral Line) (10 mEq IntraVENous New Bag 2/14/23 1304)           Is this patient to be included in the SEP-1 Core Measure due to severe sepsis or septic shock? No Exclusion criteria - the patient is NOT to be included for SEP-1 Core Measure due to:  Infection is not suspected        Medical Decision Making/Differential Diagnosis:    CC/HPI Summary, Social Determinants of health, Records Reviewed, DDx, testing done/not done, ED Course, Reassessment, disposition considerations/shared decision making with patient, consults, disposition:      ED Course as of 02/14/23 1617   Tue Feb 14, 2023   0842 Contacted the facility where patient is from. They state that he has been having aggressive behavior since he was discharged after having a stroke but has been worse over the past 24 hours. [MS]   1008 Patient's potassium is 2.7. Patient will be given IV and oral replacement. 50 mill equivalents of K-Lyte have been ordered p.o. as well as 10 mill equivalents of KCl IV. [MS]   9705 Patient resting in bed. Has not been agitated or cooperative while in the ED. Patient's repeat potassium is 3.5. Magnesium is 1.9. He is can be discharged back to the facility. [MS]      ED Course User Index  [MS] Artur Pichardo, DO        Medical Decision Making  Amount and/or Complexity of Data Reviewed  Labs: ordered. Radiology: ordered. ECG/medicine tests: ordered. Risk  Prescription drug management. Patient presents to the ED for evaluation of altered mental status. The facility that he lives at reports that he has been exhibiting abnormal behavior today's been very aggressive biting and spitting at staff and has been throwing himself out of his chair. They do report that he is aphasic at baseline from previous stroke. Patient has right-sided deficits from previous stroke. Patient voices no complaints. Denies chest pain, shortness of breath, nausea, vomiting, or abdominal pain. Patient not aggressive upon presentation to the ED and is cooperative with ED staff. On exam patient appears to be in no distress. Cooperative with ED staff. No aggressive behavior witnessed. Patient does have deficits of the right upper and lower extremity from prior stroke. Mild expressive aphasia noted. Heart regular rhythm. Lungs clear to auscultation. No abdominal tenderness. Workup in the ED consisted of appropriate labs and imaging. The following labs were evaluated interpreted by myself. CBC showed mild leukocytosis. No evidence of anemia. BMP showed potassium of 2.7. No other electrolyte abnormalities. No evidence of renal insufficiency.   Patient had been given a total of 20 mEq of KCl IV and on reassessment of the potassium it was 3.5.  We also evaluated his magnesium level as his potassium was low which was 1.9.  Urinalysis was done and showed no clear evidence of infection.  There was large blood present.  Rare bacteria with only a few white blood cells present.  CT head was obtained and interpreted by the radiologist as showing no acute intracranial abnormality.  No evidence of hemorrhage.  Old infarct present.  Patient is at baseline and has been cooperative in the ED.  Is going to be discharged back to his facility. Patient continues to be non-toxic on re-evaluation. Findings were discussed with the patient and reasons to immediately return to the ED were articulated to them. They will follow-up with their PMD.        CONSULTS: (Who and What was discussed)  None        I am the Primary Clinician of Record.    FINAL IMPRESSION      1. Hypokalemia    2. Aggressive behavior of adult          DISPOSITION/PLAN     DISPOSITION Decision To Discharge 02/14/2023 04:14:27 PM      PATIENT REFERRED TO:  Jose Fischer MD  [address] [facility] [address]  [phone]    Call   If symptoms worsen return    DISCHARGE MEDICATIONS:  New Prescriptions    No medications on file       DISCONTINUED MEDICATIONS:  Discontinued Medications    No medications on file              (Please note that portions of this note were completed with a voice recognition program.  Efforts were made to edit the dictations but occasionally words are mis-transcribed.)    Jose Warren DO (electronically signed)            Jose Warren DO  02/14/23 7136

## 2023-02-15 LAB
EKG ATRIAL RATE: 113 BPM
EKG P AXIS: 39 DEGREES
EKG P-R INTERVAL: 108 MS
EKG Q-T INTERVAL: 364 MS
EKG QRS DURATION: 76 MS
EKG QTC CALCULATION (BAZETT): 499 MS
EKG T AXIS: -136 DEGREES
EKG VENTRICULAR RATE: 113 BPM

## 2023-02-15 PROCEDURE — 93010 ELECTROCARDIOGRAM REPORT: CPT | Performed by: INTERNAL MEDICINE

## 2023-03-21 NOTE — CARE COORDINATION
No SS NOTE: ASHLEY made contact with Marion at [email or web address] today. She took all of the pt's information, however they have never had any encounters with pt in the past.  ASHLEY will reach out to the manager of the pt's high rise to see if she has any influence with pt and to discuss the deplorable conditions of his apt. SITA Iqbal.7/26/2021.10:12 AM.